# Patient Record
Sex: FEMALE | Race: BLACK OR AFRICAN AMERICAN | NOT HISPANIC OR LATINO | Employment: UNEMPLOYED | URBAN - METROPOLITAN AREA
[De-identification: names, ages, dates, MRNs, and addresses within clinical notes are randomized per-mention and may not be internally consistent; named-entity substitution may affect disease eponyms.]

---

## 2019-06-29 ENCOUNTER — HOSPITAL ENCOUNTER (EMERGENCY)
Facility: HOSPITAL | Age: 44
Discharge: HOME | End: 2019-06-29
Attending: SURGERY
Payer: MEDICAID

## 2019-06-29 VITALS
TEMPERATURE: 98 F | WEIGHT: 225 LBS | OXYGEN SATURATION: 97 % | BODY MASS INDEX: 39.87 KG/M2 | DIASTOLIC BLOOD PRESSURE: 59 MMHG | HEIGHT: 63 IN | RESPIRATION RATE: 16 BRPM | HEART RATE: 64 BPM | SYSTOLIC BLOOD PRESSURE: 111 MMHG

## 2019-06-29 DIAGNOSIS — I16.9 HYPERTENSIVE CRISIS: Primary | ICD-10-CM

## 2019-06-29 DIAGNOSIS — G44.209 ACUTE NON INTRACTABLE TENSION-TYPE HEADACHE: ICD-10-CM

## 2019-06-29 DIAGNOSIS — I10 HYPERTENSION: ICD-10-CM

## 2019-06-29 PROCEDURE — 99284 EMERGENCY DEPT VISIT MOD MDM: CPT | Mod: ER

## 2019-06-29 PROCEDURE — 93010 EKG 12-LEAD: ICD-10-PCS | Mod: ,,, | Performed by: INTERNAL MEDICINE

## 2019-06-29 PROCEDURE — 93005 ELECTROCARDIOGRAM TRACING: CPT | Mod: ER

## 2019-06-29 PROCEDURE — 93010 ELECTROCARDIOGRAM REPORT: CPT | Mod: ,,, | Performed by: INTERNAL MEDICINE

## 2019-06-29 PROCEDURE — 25000003 PHARM REV CODE 250: Mod: ER | Performed by: SURGERY

## 2019-06-29 RX ORDER — BUTALBITAL, ACETAMINOPHEN AND CAFFEINE 50; 325; 40 MG/1; MG/1; MG/1
1 TABLET ORAL EVERY 4 HOURS PRN
Qty: 15 TABLET | Refills: 0 | Status: SHIPPED | OUTPATIENT
Start: 2019-06-29 | End: 2019-07-29

## 2019-06-29 RX ORDER — CLONIDINE HYDROCHLORIDE 0.1 MG/1
0.1 TABLET ORAL
COMMUNITY
End: 2019-06-29 | Stop reason: SDUPTHER

## 2019-06-29 RX ORDER — CLONIDINE HYDROCHLORIDE 0.1 MG/1
0.1 TABLET ORAL
Qty: 10 TABLET | Refills: 2 | Status: ON HOLD | OUTPATIENT
Start: 2019-06-29 | End: 2019-07-08 | Stop reason: HOSPADM

## 2019-06-29 RX ORDER — HYDROCODONE BITARTRATE AND ACETAMINOPHEN 5; 325 MG/1; MG/1
1 TABLET ORAL
Status: COMPLETED | OUTPATIENT
Start: 2019-06-29 | End: 2019-06-29

## 2019-06-29 RX ORDER — AMLODIPINE BESYLATE 5 MG/1
10 TABLET ORAL DAILY
Qty: 30 TABLET | Refills: 2 | Status: ON HOLD | OUTPATIENT
Start: 2019-06-29 | End: 2019-07-08 | Stop reason: SDUPTHER

## 2019-06-29 RX ORDER — ONDANSETRON 4 MG/1
8 TABLET, ORALLY DISINTEGRATING ORAL
Status: COMPLETED | OUTPATIENT
Start: 2019-06-29 | End: 2019-06-29

## 2019-06-29 RX ADMIN — ONDANSETRON 8 MG: 4 TABLET, ORALLY DISINTEGRATING ORAL at 05:06

## 2019-06-29 RX ADMIN — CLONIDINE HYDROCHLORIDE 0.3 MG: 0.2 TABLET ORAL at 05:06

## 2019-06-29 RX ADMIN — HYDROCODONE BITARTRATE AND ACETAMINOPHEN 1 TABLET: 5; 325 TABLET ORAL at 05:06

## 2019-06-29 NOTE — ED PROVIDER NOTES
Encounter Date: 6/29/2019       History     Chief Complaint   Patient presents with    Headache     Pt c/o headache since last pm, states has had intermittently x 3 days.  States right eye area hurts. Denies blurred visioln.       Patient has had intermittent headache for 3 days dating to Thursday morning.  The pain is right retro-orbital and frontal and waxes and wanes.  The pain started again this morning and she came in for evaluation.  Her blood pressure was very high 247/131.  She takes tenormin 50 mg daily    The history is provided by the patient.   Hypertension    This is a chronic problem. The current episode started several days ago. The problem has been waxing and waning. Associated symptoms include headaches. Pertinent negatives include no chest pain. There are no associated agents to hypertension. There are no known risk factors.     Review of patient's allergies indicates:   Allergen Reactions    Shellfish containing products Hives     Past Medical History:   Diagnosis Date    Hypertension     Seizures      Past Surgical History:   Procedure Laterality Date    DILATION AND CURETTAGE OF UTERUS       History reviewed. No pertinent family history.  Social History     Tobacco Use    Smoking status: Never Smoker   Substance Use Topics    Alcohol use: No    Drug use: Not on file     Review of Systems   Constitutional: Negative.    HENT: Negative.    Eyes: Negative.    Respiratory: Negative.    Cardiovascular: Negative.  Negative for chest pain.   Gastrointestinal: Negative.    Endocrine: Negative.    Genitourinary: Negative.    Musculoskeletal: Negative.    Skin: Negative.    Allergic/Immunologic: Negative.    Neurological: Positive for headaches.   Hematological: Negative.    Psychiatric/Behavioral: Negative.        Physical Exam     Initial Vitals [06/29/19 1652]   BP Pulse Resp Temp SpO2   (!) 247/131 67 18 98.3 °F (36.8 °C) 100 %      MAP       --         Physical Exam    Nursing note and vitals  reviewed.  Constitutional: She appears well-developed and well-nourished.   HENT:   Head: Normocephalic.   Nose: Right sinus exhibits frontal sinus tenderness.   Eyes: Conjunctivae are normal.   Neck: Normal range of motion.   Cardiovascular: Normal rate.   Pulmonary/Chest: Breath sounds normal.   Abdominal: Soft.   Musculoskeletal: Normal range of motion.   Neurological: She is alert and oriented to person, place, and time. She has normal strength. GCS score is 15. GCS eye subscore is 4. GCS verbal subscore is 5. GCS motor subscore is 6.   NIH stroke scale 0   Skin: Capillary refill takes less than 2 seconds.   Psychiatric: She has a normal mood and affect.         ED Course   Procedures  Labs Reviewed - No data to display  EKG Readings: (Independently Interpreted)   Initial Reading: No STEMI. Rhythm: Normal Sinus Rhythm.   Nonspecific ST and T-wave abnormality       Imaging Results    None       X-Rays:   Independently Interpreted Readings:   Head CT: No hemorrhage.  No skull fracture.  No acute stroke.     Medical Decision Making:   Initial Assessment:   Hypertensive urgency  Clinical Tests:   Lab Tests: Ordered and Reviewed  Radiological Study: Ordered and Reviewed  Medical Tests: Ordered and Reviewed                      Clinical Impression:       ICD-10-CM ICD-9-CM   1. Hypertensive crisis I16.9 401.9   2. Hypertension I10 401.9   3. Acute non intractable tension-type headache G44.209 339.10         Disposition:   Disposition: Discharged  Condition: Stable                        Socorro Conway MD  06/29/19 1935

## 2019-06-30 NOTE — ED NOTES
Patient aox4, lying on stretcher, in no acute distress, vss. Patient denies any pain at this time. Dr. Conway in process of discharge. Family at bedside. Will continue to monitor.

## 2019-07-06 ENCOUNTER — HOSPITAL ENCOUNTER (OUTPATIENT)
Facility: HOSPITAL | Age: 44
Discharge: HOME | End: 2019-07-08
Attending: SURGERY | Admitting: FAMILY MEDICINE
Payer: MEDICAID

## 2019-07-06 DIAGNOSIS — I63.9 STROKE: ICD-10-CM

## 2019-07-06 DIAGNOSIS — G45.9 TIA (TRANSIENT ISCHEMIC ATTACK): ICD-10-CM

## 2019-07-06 DIAGNOSIS — I16.0 HYPERTENSIVE URGENCY: ICD-10-CM

## 2019-07-06 DIAGNOSIS — I16.9 HYPERTENSIVE CRISIS: Primary | ICD-10-CM

## 2019-07-06 DIAGNOSIS — I10 ESSENTIAL HYPERTENSION: Chronic | ICD-10-CM

## 2019-07-06 DIAGNOSIS — I63.411 CEREBRAL INFARCTION DUE TO EMBOLISM OF RIGHT MIDDLE CEREBRAL ARTERY: ICD-10-CM

## 2019-07-06 PROBLEM — D50.9 MICROCYTIC ANEMIA: Status: ACTIVE | Noted: 2019-07-06

## 2019-07-06 LAB
ACANTHOCYTES BLD QL SMEAR: PRESENT
ALBUMIN SERPL BCP-MCNC: 3.9 G/DL (ref 3.5–5.2)
ALP SERPL-CCNC: 62 U/L (ref 38–126)
ALT SERPL W/O P-5'-P-CCNC: 13 U/L (ref 10–44)
ANION GAP SERPL CALC-SCNC: 8 MMOL/L (ref 8–16)
ANISOCYTOSIS BLD QL SMEAR: SLIGHT
AST SERPL-CCNC: 19 U/L (ref 15–46)
BASOPHILS # BLD AUTO: 0.04 K/UL (ref 0–0.2)
BASOPHILS NFR BLD: 0.6 % (ref 0–1.9)
BILIRUB SERPL-MCNC: 0.3 MG/DL (ref 0.1–1)
BILIRUB UR QL STRIP: NEGATIVE
BUN SERPL-MCNC: 10 MG/DL (ref 7–17)
CALCIUM SERPL-MCNC: 8.8 MG/DL (ref 8.7–10.5)
CHLORIDE SERPL-SCNC: 106 MMOL/L (ref 95–110)
CHOLEST SERPL-MCNC: 150 MG/DL (ref 120–199)
CHOLEST/HDLC SERPL: 3 {RATIO} (ref 2–5)
CLARITY UR: CLEAR
CO2 SERPL-SCNC: 25 MMOL/L (ref 23–29)
COLOR UR: YELLOW
CREAT SERPL-MCNC: 0.58 MG/DL (ref 0.5–1.4)
DIFFERENTIAL METHOD: ABNORMAL
EOSINOPHIL # BLD AUTO: 0.2 K/UL (ref 0–0.5)
EOSINOPHIL NFR BLD: 3.5 % (ref 0–8)
ERYTHROCYTE [DISTWIDTH] IN BLOOD BY AUTOMATED COUNT: 20.3 % (ref 11.5–14.5)
EST. GFR  (AFRICAN AMERICAN): >60 ML/MIN/1.73 M^2
EST. GFR  (NON AFRICAN AMERICAN): >60 ML/MIN/1.73 M^2
ESTIMATED AVG GLUCOSE: 105 MG/DL (ref 68–131)
FERRITIN SERPL-MCNC: 3 NG/ML (ref 20–300)
GLUCOSE SERPL-MCNC: 110 MG/DL (ref 70–110)
GLUCOSE UR QL STRIP: NEGATIVE
HBA1C MFR BLD HPLC: 5.3 % (ref 4–5.6)
HCT VFR BLD AUTO: 29.9 % (ref 37–48.5)
HDLC SERPL-MCNC: 50 MG/DL (ref 40–75)
HDLC SERPL: 33.3 % (ref 20–50)
HGB BLD-MCNC: 8.4 G/DL (ref 12–16)
HGB UR QL STRIP: NEGATIVE
HYPOCHROMIA BLD QL SMEAR: ABNORMAL
INR PPP: 1.1 (ref 0.8–1.2)
IRON SERPL-MCNC: 15 UG/DL (ref 30–160)
KETONES UR QL STRIP: NEGATIVE
LDLC SERPL CALC-MCNC: 88.2 MG/DL (ref 63–159)
LEUKOCYTE ESTERASE UR QL STRIP: NEGATIVE
LYMPHOCYTES # BLD AUTO: 2.5 K/UL (ref 1–4.8)
LYMPHOCYTES NFR BLD: 36.5 % (ref 18–48)
MAGNESIUM SERPL-MCNC: 1.8 MG/DL (ref 1.6–2.6)
MCH RBC QN AUTO: 18.5 PG (ref 27–31)
MCHC RBC AUTO-ENTMCNC: 28.1 G/DL (ref 32–36)
MCV RBC AUTO: 66 FL (ref 82–98)
MONOCYTES # BLD AUTO: 0.6 K/UL (ref 0.3–1)
MONOCYTES NFR BLD: 8.8 % (ref 4–15)
NEUTROPHILS # BLD AUTO: 3.5 K/UL (ref 1.8–7.7)
NEUTROPHILS NFR BLD: 50.6 % (ref 38–73)
NITRITE UR QL STRIP: NEGATIVE
NONHDLC SERPL-MCNC: 100 MG/DL
PH UR STRIP: 6 [PH] (ref 5–8)
PHOSPHATE SERPL-MCNC: 3.3 MG/DL (ref 2.7–4.5)
PLATELET # BLD AUTO: 302 K/UL (ref 150–350)
PMV BLD AUTO: ABNORMAL FL (ref 9.2–12.9)
POCT GLUCOSE: 98 MG/DL (ref 70–110)
POIKILOCYTOSIS BLD QL SMEAR: SLIGHT
POTASSIUM SERPL-SCNC: 4.4 MMOL/L (ref 3.5–5.1)
PROT SERPL-MCNC: 7.3 G/DL (ref 6–8.4)
PROT UR QL STRIP: NEGATIVE
PROTHROMBIN TIME: 12.4 SEC (ref 9–12.5)
RBC # BLD AUTO: 4.55 M/UL (ref 4–5.4)
SATURATED IRON: 3 % (ref 20–50)
SODIUM SERPL-SCNC: 139 MMOL/L (ref 136–145)
SP GR UR STRIP: 1.02 (ref 1–1.03)
TOTAL IRON BINDING CAPACITY: 474 UG/DL (ref 250–450)
TRANSFERRIN SERPL-MCNC: 320 MG/DL (ref 200–375)
TRIGL SERPL-MCNC: 59 MG/DL (ref 30–150)
TROPONIN I SERPL DL<=0.01 NG/ML-MCNC: <0.006 NG/ML (ref 0–0.03)
TSH SERPL DL<=0.005 MIU/L-ACNC: 3.05 UIU/ML (ref 0.4–4)
URN SPEC COLLECT METH UR: NORMAL
UROBILINOGEN UR STRIP-ACNC: NEGATIVE EU/DL
WBC # BLD AUTO: 6.93 K/UL (ref 3.9–12.7)

## 2019-07-06 PROCEDURE — 25000003 PHARM REV CODE 250: Mod: ER | Performed by: SURGERY

## 2019-07-06 PROCEDURE — 82728 ASSAY OF FERRITIN: CPT

## 2019-07-06 PROCEDURE — 63600175 PHARM REV CODE 636 W HCPCS: Performed by: FAMILY MEDICINE

## 2019-07-06 PROCEDURE — 96361 HYDRATE IV INFUSION ADD-ON: CPT | Mod: ER

## 2019-07-06 PROCEDURE — 63600175 PHARM REV CODE 636 W HCPCS: Mod: ER | Performed by: SURGERY

## 2019-07-06 PROCEDURE — 81003 URINALYSIS AUTO W/O SCOPE: CPT

## 2019-07-06 PROCEDURE — 11000001 HC ACUTE MED/SURG PRIVATE ROOM

## 2019-07-06 PROCEDURE — 83540 ASSAY OF IRON: CPT

## 2019-07-06 PROCEDURE — 99291 CRITICAL CARE FIRST HOUR: CPT | Mod: 25,ER

## 2019-07-06 PROCEDURE — 80061 LIPID PANEL: CPT

## 2019-07-06 PROCEDURE — 84443 ASSAY THYROID STIM HORMONE: CPT | Mod: ER

## 2019-07-06 PROCEDURE — 83036 HEMOGLOBIN GLYCOSYLATED A1C: CPT

## 2019-07-06 PROCEDURE — 25000003 PHARM REV CODE 250: Performed by: PHYSICIAN ASSISTANT

## 2019-07-06 PROCEDURE — 82962 GLUCOSE BLOOD TEST: CPT | Mod: ER

## 2019-07-06 PROCEDURE — 85025 COMPLETE CBC W/AUTO DIFF WBC: CPT | Mod: ER

## 2019-07-06 PROCEDURE — 36415 COLL VENOUS BLD VENIPUNCTURE: CPT

## 2019-07-06 PROCEDURE — 80053 COMPREHEN METABOLIC PANEL: CPT | Mod: ER

## 2019-07-06 PROCEDURE — 93010 ELECTROCARDIOGRAM REPORT: CPT | Mod: ,,, | Performed by: INTERNAL MEDICINE

## 2019-07-06 PROCEDURE — 96374 THER/PROPH/DIAG INJ IV PUSH: CPT | Mod: ER

## 2019-07-06 PROCEDURE — 99205 OFFICE O/P NEW HI 60 MIN: CPT | Mod: GT,,, | Performed by: PSYCHIATRY & NEUROLOGY

## 2019-07-06 PROCEDURE — 99205 PR OFFICE/OUTPT VISIT, NEW, LEVL V, 60-74 MIN: ICD-10-PCS | Mod: GT,,, | Performed by: PSYCHIATRY & NEUROLOGY

## 2019-07-06 PROCEDURE — 84484 ASSAY OF TROPONIN QUANT: CPT

## 2019-07-06 PROCEDURE — G0378 HOSPITAL OBSERVATION PER HR: HCPCS

## 2019-07-06 PROCEDURE — 93010 EKG 12-LEAD: ICD-10-PCS | Mod: ,,, | Performed by: INTERNAL MEDICINE

## 2019-07-06 PROCEDURE — 83735 ASSAY OF MAGNESIUM: CPT

## 2019-07-06 PROCEDURE — 84100 ASSAY OF PHOSPHORUS: CPT

## 2019-07-06 PROCEDURE — 93005 ELECTROCARDIOGRAM TRACING: CPT | Mod: ER

## 2019-07-06 PROCEDURE — 85610 PROTHROMBIN TIME: CPT | Mod: ER

## 2019-07-06 PROCEDURE — 94760 N-INVAS EAR/PLS OXIMETRY 1: CPT | Mod: ER

## 2019-07-06 RX ORDER — CALCIUM CARBONATE 200(500)MG
500 TABLET,CHEWABLE ORAL DAILY PRN
Status: DISCONTINUED | OUTPATIENT
Start: 2019-07-06 | End: 2019-07-08 | Stop reason: HOSPADM

## 2019-07-06 RX ORDER — ATENOLOL 50 MG/1
100 TABLET ORAL 2 TIMES DAILY
Status: ON HOLD | COMMUNITY
End: 2019-07-08 | Stop reason: HOSPADM

## 2019-07-06 RX ORDER — SODIUM CHLORIDE 0.9 % (FLUSH) 0.9 %
10 SYRINGE (ML) INJECTION
Status: DISCONTINUED | OUTPATIENT
Start: 2019-07-06 | End: 2019-07-08 | Stop reason: HOSPADM

## 2019-07-06 RX ORDER — ENOXAPARIN SODIUM 100 MG/ML
40 INJECTION SUBCUTANEOUS EVERY 24 HOURS
Status: DISCONTINUED | OUTPATIENT
Start: 2019-07-06 | End: 2019-07-08 | Stop reason: HOSPADM

## 2019-07-06 RX ORDER — ASPIRIN 81 MG/1
81 TABLET ORAL DAILY
Status: DISCONTINUED | OUTPATIENT
Start: 2019-07-07 | End: 2019-07-08 | Stop reason: HOSPADM

## 2019-07-06 RX ORDER — ACETAMINOPHEN 325 MG/1
650 TABLET ORAL EVERY 6 HOURS PRN
Status: DISCONTINUED | OUTPATIENT
Start: 2019-07-06 | End: 2019-07-08 | Stop reason: HOSPADM

## 2019-07-06 RX ORDER — ONDANSETRON 8 MG/1
8 TABLET, ORALLY DISINTEGRATING ORAL EVERY 8 HOURS PRN
Status: DISCONTINUED | OUTPATIENT
Start: 2019-07-06 | End: 2019-07-08 | Stop reason: HOSPADM

## 2019-07-06 RX ORDER — ATORVASTATIN CALCIUM 40 MG/1
80 TABLET, FILM COATED ORAL DAILY
Status: DISCONTINUED | OUTPATIENT
Start: 2019-07-07 | End: 2019-07-06

## 2019-07-06 RX ORDER — LABETALOL HYDROCHLORIDE 5 MG/ML
10 INJECTION, SOLUTION INTRAVENOUS
Status: DISCONTINUED | OUTPATIENT
Start: 2019-07-06 | End: 2019-07-07

## 2019-07-06 RX ORDER — ATORVASTATIN CALCIUM 40 MG/1
80 TABLET, FILM COATED ORAL NIGHTLY
Status: DISCONTINUED | OUTPATIENT
Start: 2019-07-06 | End: 2019-07-08 | Stop reason: HOSPADM

## 2019-07-06 RX ORDER — ASPIRIN 325 MG
325 TABLET ORAL
Status: COMPLETED | OUTPATIENT
Start: 2019-07-06 | End: 2019-07-06

## 2019-07-06 RX ORDER — HYDRALAZINE HYDROCHLORIDE 20 MG/ML
10 INJECTION INTRAMUSCULAR; INTRAVENOUS EVERY 6 HOURS PRN
Status: DISCONTINUED | OUTPATIENT
Start: 2019-07-06 | End: 2019-07-07

## 2019-07-06 RX ADMIN — SODIUM CHLORIDE 250 ML: 0.9 INJECTION, SOLUTION INTRAVENOUS at 01:07

## 2019-07-06 RX ADMIN — LORAZEPAM 1 MG: 2 INJECTION INTRAMUSCULAR; INTRAVENOUS at 10:07

## 2019-07-06 RX ADMIN — ASPIRIN 325 MG ORAL TABLET 325 MG: 325 PILL ORAL at 09:07

## 2019-07-06 RX ADMIN — ENOXAPARIN SODIUM 40 MG: 100 INJECTION SUBCUTANEOUS at 07:07

## 2019-07-06 RX ADMIN — CLONIDINE HYDROCHLORIDE 0.3 MG: 0.2 TABLET ORAL at 10:07

## 2019-07-06 RX ADMIN — ATORVASTATIN CALCIUM 80 MG: 40 TABLET, FILM COATED ORAL at 08:07

## 2019-07-06 NOTE — HPI
Ms. Maru Camacho is a 42 y/o female with PMH of hypertension and seizures (last one 21 years ago). She presents with complaint of sudden onset left-sided numbness, heaviness, and pain to her arm and face around 0830 this morning. She states that she was in her usual state of health when she woke up and she took her BP meds as scheduled. Symptoms started after taking a shower and also include headache behind her right eye and lightheadedness. She denies fever, blurry vision, slurred speech, facial drooping, chest pain, shortness of breath, nausea, vomiting, abdominal pain, leg swelling or pain, difficulty ambulating, or seizure activity. Patient states that she has had high blood pressure since she was 24. She was seen in the ED last weekend for hypertensive urgency where she was prescribed clonidine as needed. She denies tobacco use and uses alcohol occasionally. She denies history of stroke or MI in her family. She lives in [place], [place] with her . Her PCP is Dr. Levine.    ED workup revealed hgb 8.4, labs otherwise unremarkable. Chest x-ray showed cardiomegaly without acute intrathoracic process. CT head showed no intracranial findings. /137, vitals otherwise stable. Telestroke consult by Dr. Guerin recommended admission for stroke workup. Not a tPA candidate due to minimal non-disabling symptoms. She was given aspirin 325 mg, clonidine 0.3 mg, ativan 1 mg, and  cc. She was admitted to [facility].

## 2019-07-06 NOTE — SUBJECTIVE & OBJECTIVE
"  Woke up with symptoms?: no    Recent bleeding noted: no  Does the patient take any Blood Thinners? yes  Medications: Antiplatelets:  aspirin      Past Medical History: hypertension    Past Surgical History: no relevant surgical history and no major surgeries within the last 2 weeks    Family History: no relevant history    Social History: no smoking, no drinking, no drugs    Allergies: Shellfish Containing Products      Review of Systems   Neurological: Positive for numbness and headaches.   All other systems reviewed and are negative.    Objective:   Vitals: Blood pressure (!) 235/137, pulse 63, resp. rate 18, height 5' 4" (1.626 m), weight 98.9 kg (218 lb), last menstrual period 06/28/2019, SpO2 98 %.      CT READ: Yes  No hemmorhage. No mass effect. No early infarct signs.     Physical Exam   Constitutional: She appears well-developed.   HENT:   Head: Normocephalic.   Eyes: Pupils are equal, round, and reactive to light.   Cardiovascular: Normal rate.   Pulmonary/Chest: Effort normal.   Abdominal: Soft.   Neurological: She is alert. A sensory deficit is present.   Skin: Skin is warm.         "

## 2019-07-06 NOTE — PLAN OF CARE
VN cued into pt's room for introduction. VN informed pt and family that VN would be working along side bedside nurse and PCT throughout shift. Level of present pain assessed. At present no distress noted. Thoroughly discussed with patient and family plan of care. Admission assessment data collected with assistance from patient and family. Discussed with patient High fall risk protocol and interventions that have been initiated and cont be in place for safety. Patient verbalized clear understanding and cooperation using teach back method. Bed alarm presently activated and in use. Will cont to be available to patient and intervene prn.

## 2019-07-06 NOTE — ED NOTES
MD explained to pt and pt's family that the pt has been accepted per Dr Romero and that the  wait is on the Chelle to plan the pt's room usually within the hour

## 2019-07-06 NOTE — CONSULTS
[facility]  Vascular Neurology  Comprehensive Stroke Center  Tele-Consultation Note      Inpatient consult to Telemedicine-Stroke  Consult performed by: Santana Guerin MD  Consult ordered by: LUIS Frazier III, MD          Consulting Provider: LUIS FRAZIER III  Current Providers  No providers found    Patient Location:  [facility] EMERGENCY DEPARTMENT Emergency Department  Spoke hospital nurse at bedside with patient assisting consultant.     Patient information was obtained from patient.         Assessment/Plan:     STROKE DOCUMENTATION     Acute Stroke Times:   Acute Stroke Times   Last Known Normal Date: 07/06/19  Last Known Normal Time: 0855  Stroke Team Arrival Date: 07/06/19  Stroke Team Arrival Time: 0942  CT Interpretation Time: 0951    NIH Scale:  1a. Level of Consciousness: 0-->Alert, keenly responsive  1b. LOC Questions: 0-->Answers both questions correctly  1c. LOC Commands: 0-->Performs both tasks correctly  2. Best Gaze: 0-->Normal  3. Visual: 0-->No visual loss  4. Facial Palsy: 0-->Normal symmetrical movements  5a. Motor Arm, Left: 0-->No drift, limb holds 90 (or 45) degrees for full 10 secs  5b. Motor Arm, Right: 0-->No drift, limb holds 90 (or 45) degrees for full 10 secs  6a. Motor Leg, Left: 0-->No drift, leg holds 30 degree position for full 5 secs  6b. Motor Leg, Right: 0-->No drift, leg holds 30 degree position for full 5 secs  7. Limb Ataxia: 0-->Absent  8. Sensory: 1-->Mild-to-moderate sensory loss, patient feels pinprick is less sharp or is dull on the affected side, or there is a loss of superficial pain with pinprick, but patient is aware of being touched  9. Best Language: 0-->No aphasia, normal  10. Dysarthria: 0-->Normal  11. Extinction and Inattention (formerly Neglect): 0-->No abnormality  Total (NIH Stroke Scale): 1     Modified [place]    Catia Coma Scale:    ABCD2 Score:    [id number] Score:   HAS -BLED Score:   ICH Score:   Hunt &  "Daniels Classification:       Diagnoses:   Cerebral infarction due to embolism of right middle cerebral artery  SEE HPI    Patient with left sided symptoms in setting of HTN urgency  CT head normal  Not TPA or Intra-arterial Therapy/ Catheter Based Intervention candidate due to minimal non-disabling symptoms   Will need BP control and stroke evaluation    NOTE: patient routinely has been seen in ED with SBP >220. Please address either non-compliance or potential secondary cause of HTN    Antithrombotics for secondary stroke prevention: Antiplatelets: Aspirin: 81 mg daily    Statins for secondary stroke prevention and hyperlipidemia, if present:   Statins: Atorvastatin- 80 mg daily    Aggressive risk factor modification: HTN, Diet, Exercise, Obesity     Rehab efforts: The patient has been evaluated by a stroke team provider and the therapy needs have been fully considered based off the presenting complaints and exam findings. The following therapy evaluations are needed: PT evaluate and treat, OT evaluate and treat, SLP evaluate and treat, PM&R evaluate for appropriate placement    Diagnostics ordered/pending: CT scan of head without contrast to asses brain parenchyma, HgbA1C to assess blood glucose levels, Lipid Profile to assess cholesterol levels, MRA head to assess vasculature, MRA neck/arch to assess vasculature, MRI head without contrast to assess brain parenchyma, TTE to assess cardiac function/status , Other: workup of HTN    VTE prophylaxis: Enoxaparin 40 mg SQ every 24 hours    BP parameters: Infarct: No intervention, SBP <220            Blood pressure (!) 235/137, pulse 63, resp. rate 18, height 5' 4" (1.626 m), weight 98.9 kg (218 lb), last menstrual period 06/28/2019, SpO2 98 %.  Alteplase Eligible?: No  Alteplase Recommendation: Alteplase not recommended due to minimal deficit  and Elevated BP   Possible Interventional Revascularization Candidate? No; No significant neurological deficit    Disposition " "Recommendation: admit to inpatient  transfer to system sub-[facility] by  ground  stat    Subjective:     History of Present Illness:  43 year old with poorly controlled HTN.  Presents with HA and left paresthesias. CT head normal. Exam normal. BP quite elevated.      Woke up with symptoms?: no    Recent bleeding noted: no  Does the patient take any Blood Thinners? yes  Medications: Antiplatelets:  aspirin      Past Medical History: hypertension    Past Surgical History: no relevant surgical history and no major surgeries within the last 2 weeks    Family History: no relevant history    Social History: no smoking, no drinking, no drugs    Allergies: Shellfish Containing Products      Review of Systems   Neurological: Positive for numbness and headaches.   All other systems reviewed and are negative.    Objective:   Vitals: Blood pressure (!) 235/137, pulse 63, resp. rate 18, height 5' 4" (1.626 m), weight 98.9 kg (218 lb), last menstrual period 06/28/2019, SpO2 98 %.      CT READ: Yes  No hemmorhage. No mass effect. No early infarct signs.     Physical Exam   Constitutional: She appears well-developed.   HENT:   Head: Normocephalic.   Eyes: Pupils are equal, round, and reactive to light.   Cardiovascular: Normal rate.   Pulmonary/Chest: Effort normal.   Abdominal: Soft.   Neurological: She is alert. A sensory deficit is present.   Skin: Skin is warm.             Recommended the emergency room physician to have a brief discussion with the patient and/or family if available regarding the risks and benefits of treatment, and to briefly document the occurrence of that discussion in his clinical encounter note.     The attending portion of this evaluation, treatment, and documentation was performed per Santana Guerin MD via audiovisual.    Billing code:  (moderate to severe stroke, large areas of edema, some mimics)    · This patient has a critical neurological condition/illness, with high morbidity and " mortality.  · There is a high probability for acute neurological change leading to clinical and possibly life-threatening deterioration requiring highest level of physician preparedness for urgent intervention.  · Care was coordinated with other physicians involved in the patient's care.  · Radiologic studies and laboratory data were reviewed and interpreted, and plan of care was re-assessed based on the results.  · Diagnosis, treatment options and prognosis may have been discussed with the patient and/or family members or caregiver.  · Further advanced medical management and further evaluation is warranted for his care.      In your opinion, this was a: Tier 1 Van Negative    Consult End Time: 10:05 AM     Santana Guerin MD  Comprehensive Stroke Center  Vascular Neurology   [facility]

## 2019-07-06 NOTE — ED PROVIDER NOTES
"Encounter Date: 7/6/2019       History     Chief Complaint   Patient presents with    Numbness     "I feel like numbness to the left side of my face and my left arm and it started when I got out of the shower at 8:55am"     43-year-old with complaint of headache and onset of numbness over her left face and left arm at 8:55 a.m. after she got out shower.  She does not endorse dysarthria or trouble or swallowing difficulty or motor deficit or visual change    The history is provided by the patient.   Neurologic Problem   The primary symptoms include paresthesias. Primary symptoms do not include loss of consciousness, altered mental status, dizziness, visual change, focal weakness or speech change. The symptoms began 1 to 2 hours ago. The episode lasted 2 hours. The symptoms are unchanged. The neurological symptoms are focal.   Paresthesias began 1 - 3 hours ago. The paresthesias are unchanged. The paresthesias are described as tingling. Affected locations include the: head and left upper arm.   Additional symptoms do not include neck stiffness or weakness.     Review of patient's allergies indicates:   Allergen Reactions    Shellfish containing products Hives     Past Medical History:   Diagnosis Date    Hypertension     Seizures      Past Surgical History:   Procedure Laterality Date    DILATION AND CURETTAGE OF UTERUS       No family history on file.  Social History     Tobacco Use    Smoking status: Never Smoker   Substance Use Topics    Alcohol use: No    Drug use: Not on file     Review of Systems   Constitutional: Negative.    HENT: Negative.    Eyes: Negative.    Respiratory: Negative.    Cardiovascular: Negative.    Gastrointestinal: Negative.    Endocrine: Negative.    Genitourinary: Negative.    Musculoskeletal: Negative.  Negative for neck stiffness.   Skin: Negative.    Allergic/Immunologic: Negative.    Neurological: Positive for numbness and paresthesias. Negative for dizziness, speech change, " focal weakness, loss of consciousness and weakness.   Hematological: Negative.    Psychiatric/Behavioral: Negative.        Physical Exam     Initial Vitals [07/06/19 0932]   BP Pulse Resp Temp SpO2   (!) 235/137 63 18 -- 98 %      MAP       --         Physical Exam    Nursing note and vitals reviewed.  Constitutional: She appears well-developed.   HENT:   Head: Normocephalic.   Eyes: Conjunctivae are normal.   Neck: Normal range of motion.   Cardiovascular: Normal rate.   Pulmonary/Chest: Breath sounds normal.   Abdominal: Soft.   Musculoskeletal: Normal range of motion.   Neurological: She is alert and oriented to person, place, and time. No cranial nerve deficit. GCS score is 15. GCS eye subscore is 4. GCS verbal subscore is 5. GCS motor subscore is 6.   NIH stroke scale 1 with mild-to-moderate sensory loss   Skin: Capillary refill takes less than 2 seconds.   Psychiatric: She has a normal mood and affect.         ED Course   Critical Care  Date/Time: 7/6/2019 2:29 PM  Performed by: LUIS Kaur III, MD  Authorized by: LUIS Kaur III, MD   Additional history critical care time: 30 minutes  Total critical care time (exclusive of procedural time) : 30 minutes        Labs Reviewed   CBC W/ AUTO DIFFERENTIAL - Abnormal; Notable for the following components:       Result Value    Hemoglobin 8.4 (*)     Hematocrit 29.9 (*)     Mean Corpuscular Volume 66 (*)     Mean Corpuscular Hemoglobin 18.5 (*)     Mean Corpuscular Hemoglobin Conc 28.1 (*)     RDW 20.3 (*)     All other components within normal limits   COMPREHENSIVE METABOLIC PANEL   PROTIME-INR   TSH   LIPID PANEL   POCT GLUCOSE, HAND-HELD DEVICE   POCT GLUCOSE     EKG Readings: (Independently Interpreted)   Rhythm: Normal Sinus Rhythm. Ectopy: No Ectopy. Conduction: Normal. ST Segments: Normal ST Segments. T Waves: Normal. Clinical Impression: Normal Sinus Rhythm       Imaging Results          X-Ray Chest AP Portable (Final result)  Result time  07/06/19 09:54:32    Final result by Griffin Mandel Jr., MD (07/06/19 09:54:32)                 Impression:      No acute cardiopulmonary disease.  Cardiomegaly.      Electronically signed by: Griffin Mandel Jr., MD  Date:    07/06/2019  Time:    09:54             Narrative:    EXAMINATION:  XR CHEST AP PORTABLE    CLINICAL HISTORY:  Stroke;    COMPARISON:  None    FINDINGS:  The lungs are clear. The cardiac silhouette is mildly enlarged.  Mediastinum unremarkable.  No effusion or pneumothorax.  The remaining osseous structures and soft tissues are within normal limits.                               CT Head Without Contrast (Final result)  Result time 07/06/19 09:54:05    Final result by Griffin Mandel Jr., MD (07/06/19 09:54:05)                 Impression:      1. No acute intracranial findings.  All CT scans at this facility are performed  using dose modulation techniques as appropriate to performed exam including the following:  automated exposure control; adjustment of mA and/or kV according to the patients size (this includes techniques or standardized protocols for targeted exams where dose is matched to indication/reason for exam: i.e. extremities or head);  iterative reconstruction technique.      Electronically signed by: Griffin Mandel Jr., MD  Date:    07/06/2019  Time:    09:54             Narrative:    EXAMINATION:  CT HEAD WITHOUT CONTRAST    CLINICAL HISTORY:  Dizziness;    TECHNIQUE:  CT scan was obtained of the head without administration of contrast.    COMPARISON:  06/29/2019    FINDINGS:  Ventricles and basal cisterns are normal.  No hemorrhage, mass effect or midline shift.  No cerebral or cerebellar parenchymal abnormality.  Paranasal sinuses are clear.  Mastoid air cells are clear.  Calvarium is intact.                                 Medical Decision Making:   Initial Assessment:   Hypertensive crisis with paresthesias of left face  ED Management:  Hypertensive urgency with blood pressure  240/120 and paresthesias left face and arm recommend admission for blood pressure control MRI brain to rule out stroke                      Clinical Impression:       ICD-10-CM ICD-9-CM   1. Hypertensive crisis I16.9 401.9   2. Stroke I63.9 434.91   3. TIA (transient ischemic attack) G45.9 435.9         Disposition:   Disposition: Admitted                        LUIS Kaur III, MD  07/06/19 [id number]

## 2019-07-06 NOTE — H&P
"[facility]  History & Physical    Patient Name: Maru Camacho  MRN: [id number]  Admission Date: 7/6/2019  Attending Physician: Sarah Maier*   Primary Care Provider: Primary Doctor No    Patient information was obtained from patient, past medical records and ER records.     Subjective:     Principal Problem:Cerebral infarction due to embolism of right middle cerebral artery    Chief Complaint:   Chief Complaint   Patient presents with    Numbness     "I feel like numbness to the left side of my face and my left arm and it started when I got out of the shower at 8:55am"        HPI: Ms. Maru Camacho is a 42 y/o female with PMH of hypertension and seizures (last one 21 years ago). She presents with complaint of sudden onset left-sided numbness, heaviness, and pain to her arm and face around 0830 this morning. She states that she was in her usual state of health when she woke up and she took her BP meds as scheduled. Symptoms started after taking a shower and also include headache behind her right eye and lightheadedness. She denies fever, blurry vision, slurred speech, facial drooping, chest pain, shortness of breath, nausea, vomiting, abdominal pain, leg swelling or pain, difficulty ambulating, or seizure activity. Patient states that she has had high blood pressure since she was 24. She was seen in the ED last weekend for hypertensive urgency where she was prescribed clonidine as needed. She denies tobacco use and uses alcohol occasionally. She denies history of stroke or MI in her family. She lives in [place], [place] with her . Her PCP is Dr. Levine.    ED workup revealed hgb 8.4, labs otherwise unremarkable. Chest x-ray showed cardiomegaly without acute intrathoracic process. CT head showed no intracranial findings. /137, vitals otherwise stable. Telestroke consult by Dr. Guerin recommended admission for stroke workup. Not a tPA candidate due to " minimal non-disabling symptoms. She was given aspirin 325 mg, clonidine 0.3 mg, ativan 1 mg, and  cc. She was admitted to [facility].    Past Medical History:   Diagnosis Date    Hypertension     Seizures        Past Surgical History:   Procedure Laterality Date    DILATION AND CURETTAGE OF UTERUS         Review of patient's allergies indicates:   Allergen Reactions    Shellfish containing products Hives       No current facility-administered medications on file prior to encounter.      Current Outpatient Medications on File Prior to Encounter   Medication Sig    amLODIPine (NORVASC) 5 MG tablet Take 2 tablets (10 mg total) by mouth once daily.    acetaminophen (TYLENOL) 500 MG tablet Take 2 tablets (1,000 mg total) by mouth 3 (three) times daily as needed for Pain.    atenolol (TENORMIN) 50 MG tablet Take 2 tablets (100 mg total) by mouth once daily.    butalbital-acetaminophen-caffeine -40 mg (FIORICET, ESGIC) -40 mg per tablet Take 1 tablet by mouth every 4 (four) hours as needed for Pain.    cloNIDine (CATAPRES) 0.1 MG tablet Take 1 tablet (0.1 mg total) by mouth as needed (For blood pressure greater than 160).    ibuprofen (ADVIL,MOTRIN) 200 MG tablet Take 2 tablets (400 mg total) by mouth every 6 (six) hours as needed for Pain.    triamterene-hydrochlorothiazide 37.5-25 mg (MAXZIDE-25) 37.5-25 mg per tablet Take 1 tablet by mouth once daily. For blood pressure    [DISCONTINUED] LISINOPRIL ORAL Take by mouth.     Family History     None        Tobacco Use    Smoking status: Never Smoker   Substance and Sexual Activity    Alcohol use: No    Drug use: Never    Sexual activity: Yes     Partners: Male     Review of Systems   Constitutional: Negative for chills, diaphoresis, fatigue and fever.   HENT: Negative for drooling and trouble swallowing.    Eyes: Negative for photophobia and visual disturbance.   Respiratory: Negative for cough and shortness of breath.     Cardiovascular: Negative for chest pain, palpitations and leg swelling.   Gastrointestinal: Negative for abdominal distention, abdominal pain, constipation, diarrhea, nausea and vomiting.   Endocrine: Negative for polydipsia, polyphagia and polyuria.   Genitourinary: Negative for difficulty urinating, dysuria and hematuria.   Musculoskeletal: Negative for arthralgias, back pain, gait problem, myalgias and neck pain.   Skin: Negative for color change, pallor, rash and wound.   Allergic/Immunologic: Negative for environmental allergies, food allergies and immunocompromised state.   Neurological: Positive for weakness (left arm), light-headedness, numbness (left arm and face) and headaches. Negative for dizziness, seizures, syncope, facial asymmetry and speech difficulty.   Hematological: Negative for adenopathy. Does not bruise/bleed easily.   Psychiatric/Behavioral: Negative for confusion. The patient is not nervous/anxious.      Objective:     Vital Signs (Most Recent):  Temp: 97 °F (36.1 °C) (07/06/19 1733)  Pulse: (!) 58 (07/06/19 1733)  Resp: 18 (07/06/19 1733)  BP: (!) 171/82 (07/06/19 1733)  SpO2: 100 % (07/06/19 1733) Vital Signs (24h Range):  Temp:  [97 °F (36.1 °C)] 97 °F (36.1 °C)  Pulse:  [57-63] 58  Resp:  [17-25] 18  SpO2:  [96 %-100 %] 100 %  BP: (138-235)/() 171/82     Weight: 100 kg (220 lb 7.4 oz)  Body mass index is 37.84 kg/m².    Physical Exam   Constitutional: She is oriented to person, place, and time. She appears well-developed. No distress.   Obese   HENT:   Head: Normocephalic and atraumatic.   Right Ear: External ear normal.   Left Ear: External ear normal.   Nose: Nose normal.   Mouth/Throat: Oropharynx is clear and moist. No oropharyngeal exudate.   Eyes: Pupils are equal, round, and reactive to light. Conjunctivae and EOM are normal.   Neck: Normal range of motion. Neck supple. No JVD present. No tracheal deviation present.   No carotid bruit noted   Cardiovascular: Normal rate,  regular rhythm and intact distal pulses.   Murmur heard.  Pulmonary/Chest: Effort normal and breath sounds normal. No respiratory distress. She has no wheezes. She has no rales.   Abdominal: Soft. Bowel sounds are normal. She exhibits no distension. There is no tenderness.   Musculoskeletal: Normal range of motion. She exhibits no edema or tenderness.   Neurological: She is alert and oriented to person, place, and time. She has normal strength. No cranial nerve deficit or sensory deficit. She exhibits normal muscle tone. She displays no seizure activity. Coordination and gait normal. GCS eye subscore is 4. GCS verbal subscore is 5. GCS motor subscore is 6.   Skin: Skin is warm and dry. Capillary refill takes less than 2 seconds. She is not diaphoretic. No erythema.   Psychiatric: She has a normal mood and affect. Her behavior is normal. Judgment and thought content normal.   Nursing note and vitals reviewed.        CRANIAL NERVES     CN III, IV, VI   Pupils are equal, round, and reactive to light.  Extraocular motions are normal.        Significant Labs: All pertinent labs within the past 24 hours have been reviewed.    Significant Imaging: I have reviewed all pertinent imaging results/findings within the past 24 hours.   Ct Head Without Contrast    Result Date: 7/6/2019  EXAMINATION: CT HEAD WITHOUT CONTRAST CLINICAL HISTORY: Dizziness; TECHNIQUE: CT scan was obtained of the head without administration of contrast. COMPARISON: 06/29/2019 FINDINGS: Ventricles and basal cisterns are normal.  No hemorrhage, mass effect or midline shift.  No cerebral or cerebellar parenchymal abnormality.  Paranasal sinuses are clear.  Mastoid air cells are clear.  Calvarium is intact.     1. No acute intracranial findings. All CT scans at this facility are performed  using dose modulation techniques as appropriate to performed exam including the following:  automated exposure control; adjustment of mA and/or kV according to the  patients size (this includes techniques or standardized protocols for targeted exams where dose is matched to indication/reason for exam: i.e. extremities or head);  iterative reconstruction technique.    Electronically signed by: Griffin Mandel Jr., MD Date:  07/06/2019 Time: 09:54    X-ray Chest Ap Portable    Result Date: 7/6/2019  EXAMINATION: XR CHEST AP PORTABLE CLINICAL HISTORY: Stroke; COMPARISON: None FINDINGS: The lungs are clear. The cardiac silhouette is mildly enlarged.  Mediastinum unremarkable.  No effusion or pneumothorax.  The remaining osseous structures and soft tissues are within normal limits.     No acute cardiopulmonary disease.  Cardiomegaly.    Electronically signed by: Griffin Mandel Jr., MD Date:  07/06/2019 Time: 09:54      Assessment/Plan:     * Cerebral infarction due to embolism of right middle cerebral artery  Hypertensive urgency  - Known risk factors: HTN  - Check TSH, lipid profile, HbA1c  - Daily ASA, statin  - MRI/MRA, 2D ECHO (to be performed Monday)  - Monitor on telemetry   - Permissive HTN for now. Hold home meds. Labetalol or hydralazine for SBP >220  - PT/OT/ST consults  - Neuro checks q4h  - Low sodium cardiac diet when KIMBERLY passed  - Avoid NSAIDs  - See Stroke Telemedicine note    Microcytic anemia  -Hgb 8.4, MCV 66  -Check iron studies        VTE Risk Mitigation (From admission, onward)        Ordered     enoxaparin injection 40 mg  Daily      07/06/19 1736     IP VTE HIGH RISK PATIENT  Once      07/06/19 1736     Place sequential compression device  Until discontinued      07/06/19 1736          Yesi Rivera PA-C  Department of Hospital Medicine   [facility]

## 2019-07-06 NOTE — HPI
43 year old with poorly controlled HTN.  Presents with HA and left paresthesias. CT head normal. Exam normal. BP quite elevated.

## 2019-07-06 NOTE — ASSESSMENT & PLAN NOTE
Hypertensive urgency  - Known risk factors: HTN  - Check TSH, lipid profile, HbA1c  - Daily ASA, statin  - MRI/MRA, 2D ECHO (to be performed Monday)  - Monitor on telemetry   - Permissive HTN for now. Hold home meds. Labetalol or hydralazine for SBP >220  - PT/OT/ST consults  - Neuro checks q4h  - Low sodium cardiac diet when KIMBERLY passed  - Avoid NSAIDs  - See Stroke Telemedicine note

## 2019-07-06 NOTE — ASSESSMENT & PLAN NOTE
SEE HPI    Patient with left sided symptoms in setting of HTN urgency  CT head normal  Not TPA or Intra-arterial Therapy/ Catheter Based Intervention candidate due to minimal non-disabling symptoms   Will need BP control and stroke evaluation    NOTE: patient routinely has been seen in ED with SBP >220. Please address either non-compliance or potential secondary cause of HTN    Antithrombotics for secondary stroke prevention: Antiplatelets: Aspirin: 81 mg daily    Statins for secondary stroke prevention and hyperlipidemia, if present:   Statins: Atorvastatin- 80 mg daily    Aggressive risk factor modification: HTN, Diet, Exercise, Obesity     Rehab efforts: The patient has been evaluated by a stroke team provider and the therapy needs have been fully considered based off the presenting complaints and exam findings. The following therapy evaluations are needed: PT evaluate and treat, OT evaluate and treat, SLP evaluate and treat, PM&R evaluate for appropriate placement    Diagnostics ordered/pending: CT scan of head without contrast to asses brain parenchyma, HgbA1C to assess blood glucose levels, Lipid Profile to assess cholesterol levels, MRA head to assess vasculature, MRA neck/arch to assess vasculature, MRI head without contrast to assess brain parenchyma, TTE to assess cardiac function/status , Other: workup of HTN    VTE prophylaxis: Enoxaparin 40 mg SQ every 24 hours    BP parameters: Infarct: No intervention, SBP <220

## 2019-07-06 NOTE — SUBJECTIVE & OBJECTIVE
Past Medical History:   Diagnosis Date    Hypertension     Seizures        Past Surgical History:   Procedure Laterality Date    DILATION AND CURETTAGE OF UTERUS         Review of patient's allergies indicates:   Allergen Reactions    Shellfish containing products Hives       No current facility-administered medications on file prior to encounter.      Current Outpatient Medications on File Prior to Encounter   Medication Sig    amLODIPine (NORVASC) 5 MG tablet Take 2 tablets (10 mg total) by mouth once daily.    acetaminophen (TYLENOL) 500 MG tablet Take 2 tablets (1,000 mg total) by mouth 3 (three) times daily as needed for Pain.    atenolol (TENORMIN) 50 MG tablet Take 2 tablets (100 mg total) by mouth once daily.    butalbital-acetaminophen-caffeine -40 mg (FIORICET, ESGIC) -40 mg per tablet Take 1 tablet by mouth every 4 (four) hours as needed for Pain.    cloNIDine (CATAPRES) 0.1 MG tablet Take 1 tablet (0.1 mg total) by mouth as needed (For blood pressure greater than 160).    ibuprofen (ADVIL,MOTRIN) 200 MG tablet Take 2 tablets (400 mg total) by mouth every 6 (six) hours as needed for Pain.    triamterene-hydrochlorothiazide 37.5-25 mg (MAXZIDE-25) 37.5-25 mg per tablet Take 1 tablet by mouth once daily. For blood pressure    [DISCONTINUED] LISINOPRIL ORAL Take by mouth.     Family History     None        Tobacco Use    Smoking status: Never Smoker   Substance and Sexual Activity    Alcohol use: No    Drug use: Never    Sexual activity: Yes     Partners: Male     Review of Systems   Constitutional: Negative for chills, diaphoresis, fatigue and fever.   HENT: Negative for drooling and trouble swallowing.    Eyes: Negative for photophobia and visual disturbance.   Respiratory: Negative for cough and shortness of breath.    Cardiovascular: Negative for chest pain, palpitations and leg swelling.   Gastrointestinal: Negative for abdominal distention, abdominal pain, constipation,  diarrhea, nausea and vomiting.   Endocrine: Negative for polydipsia, polyphagia and polyuria.   Genitourinary: Negative for difficulty urinating, dysuria and hematuria.   Musculoskeletal: Negative for arthralgias, back pain, gait problem, myalgias and neck pain.   Skin: Negative for color change, pallor, rash and wound.   Allergic/Immunologic: Negative for environmental allergies, food allergies and immunocompromised state.   Neurological: Positive for weakness (left arm), light-headedness, numbness (left arm and face) and headaches. Negative for dizziness, seizures, syncope, facial asymmetry and speech difficulty.   Hematological: Negative for adenopathy. Does not bruise/bleed easily.   Psychiatric/Behavioral: Negative for confusion. The patient is not nervous/anxious.      Objective:     Vital Signs (Most Recent):  Temp: 97 °F (36.1 °C) (07/06/19 1733)  Pulse: (!) 58 (07/06/19 1733)  Resp: 18 (07/06/19 1733)  BP: (!) 171/82 (07/06/19 1733)  SpO2: 100 % (07/06/19 1733) Vital Signs (24h Range):  Temp:  [97 °F (36.1 °C)] 97 °F (36.1 °C)  Pulse:  [57-63] 58  Resp:  [17-25] 18  SpO2:  [96 %-100 %] 100 %  BP: (138-235)/() 171/82     Weight: 100 kg (220 lb 7.4 oz)  Body mass index is 37.84 kg/m².    Physical Exam   Constitutional: She is oriented to person, place, and time. She appears well-developed. No distress.   Obese   HENT:   Head: Normocephalic and atraumatic.   Right Ear: External ear normal.   Left Ear: External ear normal.   Nose: Nose normal.   Mouth/Throat: Oropharynx is clear and moist. No oropharyngeal exudate.   Eyes: Pupils are equal, round, and reactive to light. Conjunctivae and EOM are normal.   Neck: Normal range of motion. Neck supple. No JVD present. No tracheal deviation present.   No carotid bruit noted   Cardiovascular: Normal rate, regular rhythm and intact distal pulses.   Murmur heard.  Pulmonary/Chest: Effort normal and breath sounds normal. No respiratory distress. She has no wheezes.  She has no rales.   Abdominal: Soft. Bowel sounds are normal. She exhibits no distension. There is no tenderness.   Musculoskeletal: Normal range of motion. She exhibits no edema or tenderness.   Neurological: She is alert and oriented to person, place, and time. She has normal strength. No cranial nerve deficit or sensory deficit. She exhibits normal muscle tone. She displays no seizure activity. Coordination and gait normal. GCS eye subscore is 4. GCS verbal subscore is 5. GCS motor subscore is 6.   Skin: Skin is warm and dry. Capillary refill takes less than 2 seconds. She is not diaphoretic. No erythema.   Psychiatric: She has a normal mood and affect. Her behavior is normal. Judgment and thought content normal.   Nursing note and vitals reviewed.        CRANIAL NERVES     CN III, IV, VI   Pupils are equal, round, and reactive to light.  Extraocular motions are normal.        Significant Labs: All pertinent labs within the past 24 hours have been reviewed.    Significant Imaging: I have reviewed all pertinent imaging results/findings within the past 24 hours.   Ct Head Without Contrast    Result Date: 7/6/2019  EXAMINATION: CT HEAD WITHOUT CONTRAST CLINICAL HISTORY: Dizziness; TECHNIQUE: CT scan was obtained of the head without administration of contrast. COMPARISON: 06/29/2019 FINDINGS: Ventricles and basal cisterns are normal.  No hemorrhage, mass effect or midline shift.  No cerebral or cerebellar parenchymal abnormality.  Paranasal sinuses are clear.  Mastoid air cells are clear.  Calvarium is intact.     1. No acute intracranial findings. All CT scans at this facility are performed  using dose modulation techniques as appropriate to performed exam including the following:  automated exposure control; adjustment of mA and/or kV according to the patients size (this includes techniques or standardized protocols for targeted exams where dose is matched to indication/reason for exam: i.e. extremities or head);   iterative reconstruction technique.    Electronically signed by: Griffin Mandel Jr., MD Date:  07/06/2019 Time: 09:54    X-ray Chest Ap Portable    Result Date: 7/6/2019  EXAMINATION: XR CHEST AP PORTABLE CLINICAL HISTORY: Stroke; COMPARISON: None FINDINGS: The lungs are clear. The cardiac silhouette is mildly enlarged.  Mediastinum unremarkable.  No effusion or pneumothorax.  The remaining osseous structures and soft tissues are within normal limits.     No acute cardiopulmonary disease.  Cardiomegaly.    Electronically signed by: Griffin Mandel Jr., MD Date:  07/06/2019 Time: 09:54

## 2019-07-07 PROBLEM — G45.9 TIA (TRANSIENT ISCHEMIC ATTACK): Status: ACTIVE | Noted: 2019-07-07

## 2019-07-07 PROBLEM — I10 ESSENTIAL HYPERTENSION: Chronic | Status: ACTIVE | Noted: 2019-07-06

## 2019-07-07 LAB
ANION GAP SERPL CALC-SCNC: 5 MMOL/L (ref 8–16)
ANISOCYTOSIS BLD QL SMEAR: SLIGHT
BASOPHILS # BLD AUTO: 0.02 K/UL (ref 0–0.2)
BASOPHILS NFR BLD: 0.4 % (ref 0–1.9)
BUN SERPL-MCNC: 10 MG/DL (ref 6–20)
CALCIUM SERPL-MCNC: 8.7 MG/DL (ref 8.7–10.5)
CHLORIDE SERPL-SCNC: 108 MMOL/L (ref 95–110)
CO2 SERPL-SCNC: 26 MMOL/L (ref 23–29)
CREAT SERPL-MCNC: 0.7 MG/DL (ref 0.5–1.4)
DIFFERENTIAL METHOD: ABNORMAL
EOSINOPHIL # BLD AUTO: 0.2 K/UL (ref 0–0.5)
EOSINOPHIL NFR BLD: 3 % (ref 0–8)
ERYTHROCYTE [DISTWIDTH] IN BLOOD BY AUTOMATED COUNT: 19.4 % (ref 11.5–14.5)
EST. GFR  (AFRICAN AMERICAN): >60 ML/MIN/1.73 M^2
EST. GFR  (NON AFRICAN AMERICAN): >60 ML/MIN/1.73 M^2
GLUCOSE SERPL-MCNC: 91 MG/DL (ref 70–110)
HCT VFR BLD AUTO: 30.5 % (ref 37–48.5)
HGB BLD-MCNC: 8.3 G/DL (ref 12–16)
HYPOCHROMIA BLD QL SMEAR: ABNORMAL
LYMPHOCYTES # BLD AUTO: 2.3 K/UL (ref 1–4.8)
LYMPHOCYTES NFR BLD: 41.6 % (ref 18–48)
MCH RBC QN AUTO: 18.1 PG (ref 27–31)
MCHC RBC AUTO-ENTMCNC: 27.2 G/DL (ref 32–36)
MCV RBC AUTO: 66 FL (ref 82–98)
MONOCYTES # BLD AUTO: 0.5 K/UL (ref 0.3–1)
MONOCYTES NFR BLD: 9.2 % (ref 4–15)
NEUTROPHILS # BLD AUTO: 2.5 K/UL (ref 1.8–7.7)
NEUTROPHILS NFR BLD: 45.8 % (ref 38–73)
PLATELET # BLD AUTO: 293 K/UL (ref 150–350)
PLATELET BLD QL SMEAR: ABNORMAL
PMV BLD AUTO: ABNORMAL FL (ref 9.2–12.9)
POIKILOCYTOSIS BLD QL SMEAR: SLIGHT
POTASSIUM SERPL-SCNC: 3.8 MMOL/L (ref 3.5–5.1)
RBC # BLD AUTO: 4.59 M/UL (ref 4–5.4)
SODIUM SERPL-SCNC: 139 MMOL/L (ref 136–145)
WBC # BLD AUTO: 5.41 K/UL (ref 3.9–12.7)

## 2019-07-07 PROCEDURE — 97530 THERAPEUTIC ACTIVITIES: CPT

## 2019-07-07 PROCEDURE — 63600175 PHARM REV CODE 636 W HCPCS: Performed by: PHYSICIAN ASSISTANT

## 2019-07-07 PROCEDURE — 97116 GAIT TRAINING THERAPY: CPT

## 2019-07-07 PROCEDURE — 92523 SPEECH SOUND LANG COMPREHEN: CPT

## 2019-07-07 PROCEDURE — 97161 PT EVAL LOW COMPLEX 20 MIN: CPT

## 2019-07-07 PROCEDURE — 25000003 PHARM REV CODE 250: Performed by: FAMILY MEDICINE

## 2019-07-07 PROCEDURE — 25000003 PHARM REV CODE 250: Performed by: PHYSICIAN ASSISTANT

## 2019-07-07 PROCEDURE — 94761 N-INVAS EAR/PLS OXIMETRY MLT: CPT

## 2019-07-07 PROCEDURE — 99900038 HC OT GENERIC THERAPY SCREENING (STAT)

## 2019-07-07 PROCEDURE — 96375 TX/PRO/DX INJ NEW DRUG ADDON: CPT

## 2019-07-07 PROCEDURE — 80048 BASIC METABOLIC PNL TOTAL CA: CPT

## 2019-07-07 PROCEDURE — 92610 EVALUATE SWALLOWING FUNCTION: CPT

## 2019-07-07 PROCEDURE — 85025 COMPLETE CBC W/AUTO DIFF WBC: CPT

## 2019-07-07 PROCEDURE — G0378 HOSPITAL OBSERVATION PER HR: HCPCS

## 2019-07-07 RX ORDER — LOSARTAN POTASSIUM 25 MG/1
25 TABLET ORAL DAILY
Status: DISCONTINUED | OUTPATIENT
Start: 2019-07-07 | End: 2019-07-08

## 2019-07-07 RX ORDER — AMLODIPINE BESYLATE 5 MG/1
10 TABLET ORAL DAILY
Status: DISCONTINUED | OUTPATIENT
Start: 2019-07-07 | End: 2019-07-08 | Stop reason: HOSPADM

## 2019-07-07 RX ORDER — HYDROCHLOROTHIAZIDE 12.5 MG/1
12.5 TABLET ORAL DAILY
Status: DISCONTINUED | OUTPATIENT
Start: 2019-07-07 | End: 2019-07-08 | Stop reason: HOSPADM

## 2019-07-07 RX ORDER — HYDRALAZINE HYDROCHLORIDE 20 MG/ML
10 INJECTION INTRAMUSCULAR; INTRAVENOUS EVERY 6 HOURS PRN
Status: DISCONTINUED | OUTPATIENT
Start: 2019-07-07 | End: 2019-07-08 | Stop reason: HOSPADM

## 2019-07-07 RX ORDER — LORAZEPAM 1 MG/1
1 TABLET ORAL ONCE AS NEEDED
Status: COMPLETED | OUTPATIENT
Start: 2019-07-07 | End: 2019-07-08

## 2019-07-07 RX ORDER — FERROUS SULFATE 325(65) MG
325 TABLET, DELAYED RELEASE (ENTERIC COATED) ORAL DAILY
Status: DISCONTINUED | OUTPATIENT
Start: 2019-07-08 | End: 2019-07-08 | Stop reason: HOSPADM

## 2019-07-07 RX ADMIN — LOSARTAN POTASSIUM 25 MG: 25 TABLET ORAL at 10:07

## 2019-07-07 RX ADMIN — HYDRALAZINE HYDROCHLORIDE 10 MG: 20 INJECTION, SOLUTION INTRAMUSCULAR; INTRAVENOUS at 05:07

## 2019-07-07 RX ADMIN — ATORVASTATIN CALCIUM 80 MG: 40 TABLET, FILM COATED ORAL at 09:07

## 2019-07-07 RX ADMIN — ASPIRIN 81 MG: 81 TABLET, COATED ORAL at 08:07

## 2019-07-07 RX ADMIN — HYDROCHLOROTHIAZIDE 12.5 MG: 12.5 TABLET ORAL at 10:07

## 2019-07-07 RX ADMIN — IRON SUCROSE 300 MG: 20 INJECTION, SOLUTION INTRAVENOUS at 08:07

## 2019-07-07 RX ADMIN — AMLODIPINE BESYLATE 10 MG: 5 TABLET ORAL at 10:07

## 2019-07-07 NOTE — PLAN OF CARE
Problem: Adult Inpatient Plan of Care  Goal: Plan of Care Review  Outcome: Ongoing (interventions implemented as appropriate)  Plan of care reviewed patient verbalized understanding. Medications administered. IV Iron Sucrose infused. Neurological assessment q4hrs. Cardiac monitoring NSR. Safety maintained bed in lowest position, call bell within reach, bed alarm on.

## 2019-07-07 NOTE — PT/OT/SLP EVAL
"Speech Language Pathology Evaluation  Cognitive/Bedside Swallow    Patient Name:  Maru Camacho   MRN:  [id number]  Admitting Diagnosis: Cerebral infarction due to embolism of right middle cerebral artery    Recommendations:                  General Recommendations:  Follow-up not indicated  Diet recommendations:  Regular, Thin   Aspiration Precautions: HOB to 90 degrees   General Precautions: Standard,    Communication strategies:  none    History:     Past Medical History:   Diagnosis Date    Hypertension     Seizures        Past Surgical History:   Procedure Laterality Date    DILATION AND CURETTAGE OF UTERUS         Social History: Patient lives with  and 2 adult children at home.    Prior Intubation HX:  Not indicated this admit    Modified Barium Swallow: Not indicated this admit    Chest X-Rays:   FINDINGS:  The lungs are clear. The cardiac silhouette is mildly enlarged.  Mediastinum unremarkable.  No effusion or pneumothorax.  The remaining osseous structures and soft tissues are within normal limits.            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     Subjective     Principal Problem:Cerebral infarction due to embolism of right middle cerebral artery     Chief Complaint:        Chief Complaint   Patient presents with    Numbness       "I feel like numbness to the left side of my face and my left arm and it started when I got out of the shower at 8:55am"         HPI: Ms. Maru Camacho is a 44 y/o female with PMH of hypertension and seizures (last one 21 years ago). She presents with complaint of sudden onset left-sided numbness, heaviness, and pain to her arm and face around 0830 on 7/6/19. She states that she was in her usual state of health when she woke up and she took her BP meds as scheduled. Symptoms started after taking a shower and also include headache " behind her right eye and lightheadedness. She denies fever, blurry vision, slurred speech, facial drooping, chest pain, shortness of breath, nausea, vomiting, abdominal pain, leg swelling or pain, difficulty ambulating, or seizure activity. Patient states that she has had high blood pressure since she was 24. She was seen in the ED last weekend for hypertensive urgency where she was prescribed clonidine as needed. She denies tobacco use and uses alcohol occasionally. She denies history of stroke or MI in her family. She lives in [place], [place] with her . Her PCP is Dr. Levine.     ED workup revealed hgb 8.4, labs otherwise unremarkable. Chest x-ray showed cardiomegaly without acute intrathoracic process. CT head showed no intracranial findings. /137, vitals otherwise stable. Telestroke consult by Dr. Guerin recommended admission for stroke workup. Not a tPA candidate due to minimal non-disabling symptoms. She was given aspirin 325 mg, clonidine 0.3 mg, ativan 1 mg, and  cc. She was admitted to [facility].          Past Medical History:   Diagnosis Date    Hypertension      Seizures             Patient goals: to go home    Pain/Comfort:  · Pain Rating 1: 0/10    Objective:     Cognitive Status:    Attention No obvious deficits observed Attention to task at 100%  Orientation Oriented x4  Memory Immediate Recall Repeated list of 5 words at 100% accuracy, DelayedRepeated list of 5 words with 3 minute delay at 100% accuracy. and recall general information 100%  Problem Solving Categories named 16 items in verbally presented category in approx 45 seconds  Simple calculation mental calculation of money totals (addition and subtraction)  at 100%      Receptive Language:   Comprehension:      WFL    Pragmatics:    WFL    Expressive Language:  Verbal:    Conversational speech WFL        Motor Speech:  WFL    Voice:   WFL    Visual-Spatial:  WFL      Oral Musculature Evaluation  · Oral  Musculature: WFL  · Dentition: present and adequate  · Mucosal Quality: good  · Mandibular Strength and Mobility: WFL  · Oral Labial Strength and Mobility: WFL  · Lingual Strength and Mobility: WFL  · Buccal Strength and Mobility: WFL  · Volitional Cough: strong  · Volitional Swallow: WFL  · Voice Prior to PO Intake: clear vocal quality     Bedside Swallow Eval:   Consistencies Assessed:  · Thin liquids approx 4 oz H2O via self regulated straw sips  · Puree approx 2 tsp via self-regulated tsp bites  · Soft solids approx 1 oz via self regulated tsp bites  · Solids 2 rosa crackers via self-regulated tsp bites     Oral Phase:   · WFL    Pharyngeal Phase:   · no overt clinical signs/symptoms of aspiration  · no overt clinical signs/symptoms of pharyngeal dysphagia    Compensatory Strategies  · None    Treatment: ST provided bedside swallow study and [facility] Mental Status Examination. Pt pleasant and compliant.     Assessment:     Maru Camacho is a 43 y.o. female with a diagnosis of TIA.  She presents with oropharyngeal and speech/cognitive language skills WFL this assessment. Oral [facility] exam revealed labial/lingual/buccal strength WFL. Pt was observed to consumed PO trials of thin, puree, soft solid, and solid, as well as at least 4 pills presented by XAVIER Lopez. Pt swallowed all pills at once with straw sips of water. No anterior loss noted during or following swallows of any presented consistency. Oral cavity clear post-swallows. Pt denied odynophagia, denied globus sensation following during & following swallows. ST educated pt on s/s of dysphagia, and pt verbalized understanding. ST also presented [facility] Mental Status Examination. Pt's motor speech/intelligibility/fluency WFL during exam. Pt with overall score of 29 out of possible 30, indicating cognitive language skills WNL. Recommend: 1) Continue current PO diet of regular/thin consistencies; 2) whole pills OK. Further ST services  not warranted at this time. Pt and XAVIER Lopez were notified of these recommendations. Dr. Romero was notified of these recommendations via instant message.     Goals:   Multidisciplinary Problems     SLP Goals        Problem: SLP Goal    Goal Priority Disciplines Outcome   SLP Goal     SLP Ongoing (interventions implemented as appropriate)   Description:  1. Pt will participate in BSS and in speech/cognitive language evaluation.--GOAL MET                    Plan:     · Patient to be seen:      · Plan of Care expires:     · Plan of Care reviewed with:  patient   · SLP Follow-Up:  No       Discharge recommendations:      Barriers to Discharge:  None    Time Tracking:     SLP Treatment Date:   07/07/19  Speech Start Time:  1010  Speech Stop Time:  1045     Speech Total Time (min):  35 min    Billable Minutes: Eval 20  and Eval Swallow and Oral Function 15    Patti Castillo CCC-SLP  07/07/2019

## 2019-07-07 NOTE — PLAN OF CARE
Problem: Adult Inpatient Plan of Care  Goal: Plan of Care Review  Outcome: Revised  Pt stable. NO distress noted. POC reviewed with pt. Pt verbalized understanding. Pt remains SB on the monitor. NO true red alarms noted. Pt denied pain. Neuro checks Q 4. Left sided weakness improved. Blood pressure monitored closely. Fall precautions maintained. Bed in lowest position, call light in reach and bed alarm on.

## 2019-07-07 NOTE — SUBJECTIVE & OBJECTIVE
Interval History: Patient asymptomatic since admission, no overnight events or acute complaints. Left-sided weakness and numbness resolved. Neuro checks stable. Awaiting MRI/MRA/echo tomorrow.    Review of Systems   Constitutional: Negative for chills, diaphoresis, fatigue and fever.   Respiratory: Negative for cough and shortness of breath.    Cardiovascular: Negative for chest pain, palpitations and leg swelling.   Gastrointestinal: Negative for abdominal pain, constipation, diarrhea and nausea.   Genitourinary: Negative for difficulty urinating, dysuria and hematuria.   Musculoskeletal: Negative for arthralgias, back pain, gait problem, myalgias and neck pain.   Skin: Negative for color change, pallor, rash and wound.   Neurological: Negative for dizziness, weakness, light-headedness, numbness and headaches.   Hematological: Negative for adenopathy. Does not bruise/bleed easily.        +Craving ice   Psychiatric/Behavioral: Negative for confusion. The patient is not nervous/anxious.      Objective:     Vital Signs (Most Recent):  Temp: 96.2 °F (35.7 °C) (07/07/19 0457)  Pulse: 70 (07/07/19 0834)  Resp: 19 (07/07/19 0834)  BP: (!) 166/84 (07/07/19 0749)  SpO2: 97 % (07/07/19 0834) Vital Signs (24h Range):  Temp:  [96.1 °F (35.6 °C)-97.1 °F (36.2 °C)] 96.2 °F (35.7 °C)  Pulse:  [57-70] 70  Resp:  [17-25] 19  SpO2:  [96 %-100 %] 97 %  BP: (138-235)/() 166/84     Weight: 100 kg (220 lb 7.4 oz)  Body mass index is 37.84 kg/m².    Intake/Output Summary (Last 24 hours) at 7/7/2019 0929  Last data filed at 7/7/2019 0858  Gross per 24 hour   Intake 375 ml   Output 650 ml   Net -275 ml      Physical Exam   Constitutional: She is oriented to person, place, and time. No distress.   Obese   Eyes: Pupils are equal, round, and reactive to light. Conjunctivae are normal.   Neck: Normal range of motion. Neck supple. No JVD present. No tracheal deviation present.   Cardiovascular: Normal rate, regular rhythm and intact  distal pulses.   Murmur heard.  Pulmonary/Chest: Effort normal and breath sounds normal. No respiratory distress.   Abdominal: Soft. Bowel sounds are normal. She exhibits no distension. There is no tenderness.   Musculoskeletal: She exhibits no edema or tenderness.   Neurological: She is alert and oriented to person, place, and time. She has normal strength. No sensory deficit. She exhibits normal muscle tone. She displays no seizure activity. Gait normal. GCS eye subscore is 4. GCS verbal subscore is 5. GCS motor subscore is 6.   Skin: Skin is warm and dry. Capillary refill takes less than 2 seconds. She is not diaphoretic. No erythema.   Psychiatric: She has a normal mood and affect. Her behavior is normal.   Nursing note and vitals reviewed.      Significant Labs: All pertinent labs within the past 24 hours have been reviewed.    Significant Imaging: I have reviewed all pertinent imaging results/findings within the past 24 hours.

## 2019-07-07 NOTE — PLAN OF CARE
Problem: SLP Goal  Goal: SLP Goal  1. Pt will participate in BSS and in speech/cognitive language evaluation.--GOAL MET  Outcome: Ongoing (interventions implemented as appropriate)  ST provided bedside swallow study and speech/cognitive language assessment this evaluation. Oropharyngeal skills WFL this evaluation. Speech/cognitive language skills WFL this evaluation. Pt educated on s/s of dysphagia. Further ST services not warranted at this time.

## 2019-07-07 NOTE — PLAN OF CARE
Problem: Physical Therapy Goal  Goal: Physical Therapy Goal  Outcome: Outcome(s) achieved Date Met: 07/07/19  PT initial evaluation completed. Plan of care and goals established and discussed with patient/family.  Pt does not present c/ any acute PT needs at this time. Pt is independent for functional bed mobility and Independent for transfers; and Independent for gait for 300ft c/ No AD  Pt was extensively educated on health and wellness; modifiable and non-modifiable risk factors for hypertension and stroke; and motivational interviewing to identify and modify lifestyle of stress; diet; medication compliance; physical activity and exercise; and use of community resources available to improve overall wellbeing.     Discharge Recommendation: Home  DME Recommendation: none

## 2019-07-07 NOTE — HOSPITAL COURSE
Patient was admitted for hypertensive urgency and stroke workup. A1C, TSH, LDL all within normal limits. Found to have microcytic iron deficiency anemia and she was given venofer 300 mg IV. Initiated BP regimen with amlodipine 10 mg, losartan 50 mg and hydrochlorothiazide 12.5 mg. Propanolol discontinued due to marginal HR. Echo showed normal LVEF of 55%, grade 1 LVDD. MRI/MRA brain ordered and patient was premedicated with ativan, refused to continue with MRI due to claustrophobia. CTA head/neck ordered and patient reported iodine contrast allergy. She did not want to premedicate for CTA and decided to proceed with carotid US only which showed 50-69% stenosis left ICA, no stenosis right ICA. Patient was started on ASA 81 mg daily and pravastatin 40 mg daily for secondary stroke prevention. She was discharged to home in good condition with referrals for [facility]rology follow up.

## 2019-07-07 NOTE — PT/OT/SLP EVAL
Physical Therapy Evaluation and Discharge Note    Patient Name:  Maru Camacho   MRN:  [id number]    Recommendations:     Discharge Recommendations:  home   Discharge Equipment Recommendations: none   Barriers to discharge: None    Assessment:     Maru Camacho is a 43 y.o. female admitted with a medical diagnosis of Cerebral infarction due to embolism of right middle cerebral artery. .  At this time, patient is functioning at their prior level of function and does not require further acute PT services. She presents with the following impairments/functional limitations:  Mild Weakness in L LE.  PT initial evaluation completed. Plan of care and goals established and discussed with patient/family.  Pt does not present c/ any acute PT needs at this time. Pt is independent for functional bed mobility and Independent for transfers; and Independent for gait for >300ft c/ No AD    Rehab Prognosis: Good; patient would benefit from acute skilled PT services to address these deficits and reach maximum level of function.    Recent Surgery: * No surgery found *      Plan:     During this hospitalization, patient to be seen (PT DC) to address the identified rehab impairments via   and progress toward the following goals:    · Plan of Care Expires:  07/07/19    Subjective     Chief Complaint: None  Patient/Family Comments/goals: wants to get OOB and walk  Pain/Comfort:  · Pain Rating 1: 0/10  · Pain Rating Post-Intervention 1: 0/10    Patients cultural, spiritual, Jewish conflicts given the current situation: no    Living Environment:  Pt lives c/ spouse in [facility] w/ [facility] & [facility].  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  Equipment used at home: none.  DME owned (not currently used): none.  Upon discharge, patient will have assistance from family.    Objective:     Communicated with RNJessica prior to session.  Patient found supine with  bed alarm, telemetry  upon PT entry to room.    General Precautions: Standard, fall   Orthopedic Precautions:N/A   Braces: N/A     Exams:  · Cognitive Exam:  Patient is oriented to Person, Place, Time and Situation  · Gross Motor Coordination:  WFL  · Postural Exam:  Patient presented with the following abnormalities:    · -       No postural abnormalities identified  · Sensation:    · -       Intact to light touch  · Tone: [place] B LE  · RLE ROM: WNL  · RLE Strength: WNL  · LLE ROM: WNL  · LLE Strength: 4/5 in hip flexion and knee extension    Functional Mobility:  · Bed Mobility:     · Rolling Left:  independence  · Scooting: independence  · Supine to Sit: independence  · Transfers:     · Sit to Stand:  independence with no AD  · Gait: 300ft; 200ft c/ no AD; pt able to complete task c/ divided attention and carrying out cognitive taks c/out LOB or buckling; negotatiated turns and doorways well.  · Balance: dynamic balance: good      Therapeutic Activities and Exercises:  PT sharadal completed c/ progressive mobility and gait training as detailed above.  Pt was extensively educated on health and wellness; modifiable and non-modifiable risk factors for hypertension and stroke; and motivational interviewing to identify and modify lifestyle of stress; diet; medication compliance; physical activity and exercise; and use of community resources available to improve overall wellbeing.     AM-PAC 6 CLICK MOBILITY  Total Score:24     Patient left up in bed with call button in reach, RN notified and family present.    GOALS:   Multidisciplinary Problems     Physical Therapy Goals     Not on file          Multidisciplinary Problems (Resolved)        Problem: Physical Therapy Goal    Goal Priority Disciplines Outcome Goal Variances Interventions   Physical Therapy Goal   (Resolved)     PT, PT/OT Outcome(s) achieved                     History:     Past Medical History:   Diagnosis Date    Hypertension     Seizures         Past Surgical History:   Procedure Laterality Date    DILATION AND CURETTAGE OF UTERUS         Time Tracking:     PT Received On: 07/07/19  PT Start Time: 1241     PT Stop Time: 1326  PT Total Time (min): 45 min     Billable Minutes: Evaluation 10, Gait Training 10 and Therapeutic Activity 25      Shira Marie, PT  07/07/2019

## 2019-07-07 NOTE — PROGRESS NOTES
[facility]  Progress Note    Patient Name: aMru Camacho  MRN: [id number]  Patient Class: OP- Observation   Admission Date: 7/6/2019  Length of Stay: 1 days  Attending Physician: Sarah Maier*  Primary Care Provider: Primary Doctor Bria      Subjective:     Principal Problem:Cerebral infarction due to embolism of right middle cerebral artery      HPI:  Ms. Maru Camacho is a 42 y/o female with PMH of hypertension and seizures (last one 21 years ago). She presents with complaint of sudden onset left-sided numbness, heaviness, and pain to her arm and face around 0830 this morning. She states that she was in her usual state of health when she woke up and she took her BP meds as scheduled. Symptoms started after taking a shower and also include headache behind her right eye and lightheadedness. She denies fever, blurry vision, slurred speech, facial drooping, chest pain, shortness of breath, nausea, vomiting, abdominal pain, leg swelling or pain, difficulty ambulating, or seizure activity. Patient states that she has had high blood pressure since she was 24. She was seen in the ED last weekend for hypertensive urgency where she was prescribed clonidine as needed. She denies tobacco use and uses alcohol occasionally. She denies history of stroke or MI in her family. She lives in [place], [place] with her . Her PCP is Dr. Levine.    ED workup revealed hgb 8.4, labs otherwise unremarkable. Chest x-ray showed cardiomegaly without acute intrathoracic process. CT head showed no intracranial findings. /137, vitals otherwise stable. Telestroke consult by Dr. Guerin recommended admission for stroke workup. Not a tPA candidate due to minimal non-disabling symptoms. She was given aspirin 325 mg, clonidine 0.3 mg, ativan 1 mg, and  cc. She was admitted to [facility].    Overview/Hospital Course:  Patient admitted for hypertensive urgency and stroke  workup. A1C, TSH, LDL all within normal limits. Found to have microcytic iron deficiency anemia.    Interval History: Patient asymptomatic since admission, no overnight events or acute complaints. Left-sided weakness and numbness resolved. Neuro checks stable. Awaiting MRI/MRA/echo tomorrow.    Review of Systems   Constitutional: Negative for chills, diaphoresis, fatigue and fever.   Respiratory: Negative for cough and shortness of breath.    Cardiovascular: Negative for chest pain, palpitations and leg swelling.   Gastrointestinal: Negative for abdominal pain, constipation, diarrhea and nausea.   Genitourinary: Negative for difficulty urinating, dysuria and hematuria.   Musculoskeletal: Negative for arthralgias, back pain, gait problem, myalgias and neck pain.   Skin: Negative for color change, pallor, rash and wound.   Neurological: Negative for dizziness, weakness, light-headedness, numbness and headaches.   Hematological: Negative for adenopathy. Does not bruise/bleed easily.        +Craving ice   Psychiatric/Behavioral: Negative for confusion. The patient is not nervous/anxious.      Objective:     Vital Signs (Most Recent):  Temp: 96.2 °F (35.7 °C) (07/07/19 0457)  Pulse: 70 (07/07/19 0834)  Resp: 19 (07/07/19 0834)  BP: (!) 166/84 (07/07/19 0749)  SpO2: 97 % (07/07/19 0834) Vital Signs (24h Range):  Temp:  [96.1 °F (35.6 °C)-97.1 °F (36.2 °C)] 96.2 °F (35.7 °C)  Pulse:  [57-70] 70  Resp:  [17-25] 19  SpO2:  [96 %-100 %] 97 %  BP: (138-235)/() 166/84     Weight: 100 kg (220 lb 7.4 oz)  Body mass index is 37.84 kg/m².    Intake/Output Summary (Last 24 hours) at 7/7/2019 0929  Last data filed at 7/7/2019 0858  Gross per 24 hour   Intake 375 ml   Output 650 ml   Net -275 ml      Physical Exam   Constitutional: She is oriented to person, place, and time. No distress.   Obese   Eyes: Pupils are equal, round, and reactive to light. Conjunctivae are normal.   Neck: Normal range of motion. Neck supple. No JVD  present. No tracheal deviation present.   Cardiovascular: Normal rate, regular rhythm and intact distal pulses.   Murmur heard.  Pulmonary/Chest: Effort normal and breath sounds normal. No respiratory distress.   Abdominal: Soft. Bowel sounds are normal. She exhibits no distension. There is no tenderness.   Musculoskeletal: She exhibits no edema or tenderness.   Neurological: She is alert and oriented to person, place, and time. She has normal strength. No sensory deficit. She exhibits normal muscle tone. She displays no seizure activity. Gait normal. GCS eye subscore is 4. GCS verbal subscore is 5. GCS motor subscore is 6.   Skin: Skin is warm and dry. Capillary refill takes less than 2 seconds. She is not diaphoretic. No erythema.   Psychiatric: She has a normal mood and affect. Her behavior is normal.   Nursing note and vitals reviewed.      Significant Labs: All pertinent labs within the past 24 hours have been reviewed.    Significant Imaging: I have reviewed all pertinent imaging results/findings within the past 24 hours.      Assessment/Plan:      * Cerebral infarction due to embolism of right middle cerebral artery  Hypertensive urgency/TIA  - Asymptomatic since admission, no focal neuro deficits noted  - TSH, LDL, HbA1c all WNL  - Continue daily ASA, statin  - MRI/MRA, 2D ECHO (to be performed tomorrow)  - Monitor on telemetry   - Resume home BP meds  - PT/OT/ST consults  - Neuro checks q4h  - Low sodium cardiac diet    Microcytic anemia  -Iron studies show severe iron deficiency  -Patient has menorrhagia at times, no active bleeding  -Venofer 300 mg IV infusion  -Start ferrous sulfate 325 mg daily      Hypertensive urgency  Essential hypertension  -228. Patient takes amlodipine 10 mg daily and atenolol 100 mg BID at home. Intolerant of lisinopril. Borderline bradycardia noted on telemetry.    -Resume home amlodipine  -Will start on losartan-HCTZ 25-12.5 daily and uptitrate as needed  -Hydralazine  prn for SBP >180  -Low sodium diet and reinforce HTN education        VTE Risk Mitigation (From admission, onward)        Ordered     enoxaparin injection 40 mg  Daily      07/06/19 1736     IP VTE HIGH RISK PATIENT  Once      07/06/19 1736     Place sequential compression device  Until discontinued      07/06/19 1736          Yesi Rivera PA-C  Department of Hospital Medicine   [facility]

## 2019-07-07 NOTE — ASSESSMENT & PLAN NOTE
-Iron studies show severe iron deficiency  -Patient has menorrhagia at times, no active bleeding  -Venofer 300 mg IV infusion  -Start ferrous sulfate 325 mg daily

## 2019-07-07 NOTE — ASSESSMENT & PLAN NOTE
Hypertensive urgency/TIA  - Asymptomatic since admission, no focal neuro deficits noted  - TSH, LDL, HbA1c all WNL  - Continue daily ASA, statin  - MRI/MRA, 2D ECHO (to be performed tomorrow)  - Monitor on telemetry   - Resume home BP meds  - PT/OT/ST consults  - Neuro checks q4h  - Low sodium cardiac diet

## 2019-07-07 NOTE — ASSESSMENT & PLAN NOTE
Essential hypertension  -228. Patient takes amlodipine 10 mg daily and atenolol 100 mg BID at home. Intolerant of lisinopril. Borderline bradycardia noted on telemetry.    -Resume home amlodipine  -Will start on losartan-HCTZ 25-12.5 daily and uptitrate as needed  -Hydralazine prn for SBP >180  -Low sodium diet and reinforce HTN education

## 2019-07-07 NOTE — PT/OT/SLP PROGRESS
Occupational Therapy  SCREEN    Patient Name:  Maru Camacho   MRN:  [id number]    Pt found sitting EOB & agreeable to OT this AM. Pt lives w/ spouse in [place] w/ 4STE & RHR; t/s combo. PLOF (I) w/o DME w/ all fxnl tasks incl standing tub showers, IADLs, driving & working as an  at [employer].   Pt reports complete resolution of L sided weakness of face/UE & resumption of fxnl status at PLOF: (I).  Edu/tx re: role of OT, signs of TIA/CVA, general safety techs. Pt verbalized understanding & stated that she doesn't require OT svcs.    Per screen, pt at baseline (I) w/o residual deficits & no further OT svcs indicated at this time.  OT to d/c eval/tx orders this date.    CHARISSA Chris  7/7/2019

## 2019-07-08 VITALS
OXYGEN SATURATION: 94 % | TEMPERATURE: 97 F | HEART RATE: 88 BPM | WEIGHT: 220.44 LBS | BODY MASS INDEX: 37.63 KG/M2 | DIASTOLIC BLOOD PRESSURE: 83 MMHG | SYSTOLIC BLOOD PRESSURE: 134 MMHG | HEIGHT: 64 IN | RESPIRATION RATE: 20 BRPM

## 2019-07-08 PROBLEM — I63.411 CEREBRAL INFARCTION DUE TO EMBOLISM OF RIGHT MIDDLE CEREBRAL ARTERY: Status: RESOLVED | Noted: 2019-07-06 | Resolved: 2019-07-08

## 2019-07-08 PROBLEM — I51.89 GRADE I DIASTOLIC DYSFUNCTION: Status: ACTIVE | Noted: 2019-07-08

## 2019-07-08 PROBLEM — I16.0 HYPERTENSIVE URGENCY: Status: RESOLVED | Noted: 2019-07-06 | Resolved: 2019-07-08

## 2019-07-08 LAB
ANION GAP SERPL CALC-SCNC: 8 MMOL/L (ref 8–16)
ANISOCYTOSIS BLD QL SMEAR: SLIGHT
AORTIC ROOT ANNULUS: 3.68 CM
AV INDEX (PROSTH): 0.8
AV MEAN GRADIENT: 6 MMHG
AV PEAK GRADIENT: 11 MMHG
AV VALVE AREA: 3.41 CM2
AV VELOCITY RATIO: 0.67
BASOPHILS # BLD AUTO: 0.02 K/UL (ref 0–0.2)
BASOPHILS NFR BLD: 0.4 % (ref 0–1.9)
BSA FOR ECHO PROCEDURE: 2.11 M2
BUN SERPL-MCNC: 8 MG/DL (ref 6–20)
CALCIUM SERPL-MCNC: 9.5 MG/DL (ref 8.7–10.5)
CHLORIDE SERPL-SCNC: 105 MMOL/L (ref 95–110)
CO2 SERPL-SCNC: 25 MMOL/L (ref 23–29)
CREAT SERPL-MCNC: 0.8 MG/DL (ref 0.5–1.4)
CV ECHO LV RWT: 0.6 CM
DIFFERENTIAL METHOD: ABNORMAL
DOP CALC AO PEAK VEL: 1.66 M/S
DOP CALC AO VTI: 38.36 CM
DOP CALC LVOT AREA: 4.3 CM2
DOP CALC LVOT DIAMETER: 2.33 CM
DOP CALC LVOT PEAK VEL: 1.11 M/S
DOP CALC LVOT STROKE VOLUME: 130.66 CM3
DOP CALCLVOT PEAK VEL VTI: 30.66 CM
E WAVE DECELERATION TIME: 456.85 MSEC
E/A RATIO: 0.65
ECHO LV POSTERIOR WALL: 1.34 CM (ref 0.6–1.1)
EOSINOPHIL # BLD AUTO: 0.1 K/UL (ref 0–0.5)
EOSINOPHIL NFR BLD: 2.4 % (ref 0–8)
ERYTHROCYTE [DISTWIDTH] IN BLOOD BY AUTOMATED COUNT: 19.3 % (ref 11.5–14.5)
EST. GFR  (AFRICAN AMERICAN): >60 ML/MIN/1.73 M^2
EST. GFR  (NON AFRICAN AMERICAN): >60 ML/MIN/1.73 M^2
FRACTIONAL SHORTENING: 31 % (ref 28–44)
GLUCOSE SERPL-MCNC: 106 MG/DL (ref 70–110)
HCT VFR BLD AUTO: 32.1 % (ref 37–48.5)
HGB BLD-MCNC: 8.9 G/DL (ref 12–16)
HYPOCHROMIA BLD QL SMEAR: ABNORMAL
INTERVENTRICULAR SEPTUM: 1.35 CM (ref 0.6–1.1)
LA MAJOR: 4.97 CM
LA MINOR: 4.55 CM
LA WIDTH: 4.58 CM
LEFT ATRIUM SIZE: 4.01 CM
LEFT ATRIUM VOLUME INDEX: 36.4 ML/M2
LEFT ATRIUM VOLUME: 74.16 CM3
LEFT INTERNAL DIMENSION IN SYSTOLE: 3.05 CM (ref 2.1–4)
LEFT VENTRICLE DIASTOLIC VOLUME INDEX: 43.66 ML/M2
LEFT VENTRICLE DIASTOLIC VOLUME: 89 ML
LEFT VENTRICLE MASS INDEX: 112 G/M2
LEFT VENTRICLE SYSTOLIC VOLUME INDEX: 17.9 ML/M2
LEFT VENTRICLE SYSTOLIC VOLUME: 36.53 ML
LEFT VENTRICULAR INTERNAL DIMENSION IN DIASTOLE: 4.43 CM (ref 3.5–6)
LEFT VENTRICULAR MASS: 228.57 G
LV LATERAL E/E' RATIO: 7.89 M/S
LYMPHOCYTES # BLD AUTO: 1.4 K/UL (ref 1–4.8)
LYMPHOCYTES NFR BLD: 26 % (ref 18–48)
MCH RBC QN AUTO: 18.2 PG (ref 27–31)
MCHC RBC AUTO-ENTMCNC: 27.7 G/DL (ref 32–36)
MCV RBC AUTO: 66 FL (ref 82–98)
MONOCYTES # BLD AUTO: 0.3 K/UL (ref 0.3–1)
MONOCYTES NFR BLD: 5.9 % (ref 4–15)
MV PEAK A VEL: 1.09 M/S
MV PEAK E VEL: 0.71 M/S
NEUTROPHILS # BLD AUTO: 3.5 K/UL (ref 1.8–7.7)
NEUTROPHILS NFR BLD: 65.3 % (ref 38–73)
PISA TR MAX VEL: 2.16 M/S
PLATELET # BLD AUTO: 319 K/UL (ref 150–350)
PLATELET BLD QL SMEAR: ABNORMAL
PMV BLD AUTO: ABNORMAL FL (ref 9.2–12.9)
POIKILOCYTOSIS BLD QL SMEAR: SLIGHT
POLYCHROMASIA BLD QL SMEAR: ABNORMAL
POTASSIUM SERPL-SCNC: 3.9 MMOL/L (ref 3.5–5.1)
PULM VEIN S/D RATIO: 0.85
PV PEAK D VEL: 0.73 M/S
PV PEAK S VEL: 0.62 M/S
PV PEAK VELOCITY: 1.13 CM/S
RA MAJOR: 3.53 CM
RA PRESSURE: 3 MMHG
RBC # BLD AUTO: 4.89 M/UL (ref 4–5.4)
RIGHT VENTRICULAR END-DIASTOLIC DIMENSION: 2.52 CM
SODIUM SERPL-SCNC: 138 MMOL/L (ref 136–145)
STJ: 3.3 CM
TARGETS BLD QL SMEAR: ABNORMAL
TDI LATERAL: 0.09 M/S
TR MAX PG: 19 MMHG
TV REST PULMONARY ARTERY PRESSURE: 22 MMHG
WBC # BLD AUTO: 5.42 K/UL (ref 3.9–12.7)

## 2019-07-08 PROCEDURE — 97802 MEDICAL NUTRITION INDIV IN: CPT

## 2019-07-08 PROCEDURE — 85025 COMPLETE CBC W/AUTO DIFF WBC: CPT

## 2019-07-08 PROCEDURE — 36415 COLL VENOUS BLD VENIPUNCTURE: CPT

## 2019-07-08 PROCEDURE — 25000003 PHARM REV CODE 250: Performed by: FAMILY MEDICINE

## 2019-07-08 PROCEDURE — 25000003 PHARM REV CODE 250: Performed by: PHYSICIAN ASSISTANT

## 2019-07-08 PROCEDURE — 80048 BASIC METABOLIC PNL TOTAL CA: CPT

## 2019-07-08 PROCEDURE — G0378 HOSPITAL OBSERVATION PER HR: HCPCS

## 2019-07-08 RX ORDER — FERROUS SULFATE 325(65) MG
325 TABLET, DELAYED RELEASE (ENTERIC COATED) ORAL DAILY
Refills: 0 | COMMUNITY
Start: 2019-07-08 | End: 2023-08-28

## 2019-07-08 RX ORDER — ASPIRIN 81 MG/1
81 TABLET ORAL DAILY
Refills: 0 | COMMUNITY
Start: 2019-07-09 | End: 2019-08-22

## 2019-07-08 RX ORDER — PRAVASTATIN SODIUM 40 MG/1
40 TABLET ORAL DAILY
Qty: 90 TABLET | Refills: 3 | Status: SHIPPED | OUTPATIENT
Start: 2019-07-08 | End: 2023-08-28

## 2019-07-08 RX ORDER — AMLODIPINE BESYLATE 10 MG/1
10 TABLET ORAL DAILY
Qty: 30 TABLET | Refills: 3 | Status: SHIPPED | OUTPATIENT
Start: 2019-07-08 | End: 2019-09-16 | Stop reason: ALTCHOICE

## 2019-07-08 RX ORDER — LOSARTAN POTASSIUM AND HYDROCHLOROTHIAZIDE 12.5; 5 MG/1; MG/1
1 TABLET ORAL DAILY
Qty: 90 TABLET | Refills: 3 | Status: SHIPPED | OUTPATIENT
Start: 2019-07-08 | End: 2019-08-22

## 2019-07-08 RX ORDER — LOSARTAN POTASSIUM 50 MG/1
50 TABLET ORAL DAILY
Status: DISCONTINUED | OUTPATIENT
Start: 2019-07-09 | End: 2019-07-08 | Stop reason: HOSPADM

## 2019-07-08 RX ADMIN — HYDROCHLOROTHIAZIDE 12.5 MG: 12.5 TABLET ORAL at 10:07

## 2019-07-08 RX ADMIN — FERROUS SULFATE TAB EC 325 MG (65 MG FE EQUIVALENT) 325 MG: 325 (65 FE) TABLET DELAYED RESPONSE at 10:07

## 2019-07-08 RX ADMIN — LOSARTAN POTASSIUM 25 MG: 25 TABLET ORAL at 10:07

## 2019-07-08 RX ADMIN — ASPIRIN 81 MG: 81 TABLET, COATED ORAL at 10:07

## 2019-07-08 RX ADMIN — LORAZEPAM 1 MG: 1 TABLET ORAL at 08:07

## 2019-07-08 RX ADMIN — AMLODIPINE BESYLATE 10 MG: 5 TABLET ORAL at 10:07

## 2019-07-08 NOTE — PLAN OF CARE
Re:  Maru Camacho, WORK / SCHOOL EXCUSE    Date: 07/08/2019           [facility]       Tarik Cuenca MD, [facility]       MD Ritu Crowe, DAVID Muniz, ENDER  [address] [place] [address]  Office: [phone]  Fax: [phone]     To whom it may concern:    Ms. Maru Camacho has been hospitalized at the [facility] since 7/6/2019.  Please excuse the patient from duties.  Patient may return on 7/11/19.  No restrictions.     Please contact me if you have any questions.                __________________________  Joann Carranza PA-C

## 2019-07-08 NOTE — PROGRESS NOTES
OMAR reviewed with Ms. Camacho. Time allowed for questions. She verbalizes understanding. Prescriptions received from [facility]. Transport will be requested.

## 2019-07-08 NOTE — PLAN OF CARE
Patient AAOx3  Lives at home with significant other  No dme or home health    PCP is Dr. Pereira but open to priority care clinic- will email priority care clinic to see if they will do followup    Informed patient she was in observation status. Patient and significant other verbalize understanding.    This  put name on white board and explained blue discharge folder to patient. Discharge planning brochure and/or business card given to patient.  Patient verbalized understanding.       07/08/19 0902   Discharge Assessment   Assessment Type Discharge Planning Assessment   Confirmed/corrected address and phone number on facesheet? Yes   Assessment information obtained from? Patient   Prior to hospitilization cognitive status: Alert/Oriented   Prior to hospitalization functional status: Independent   Current cognitive status: Alert/Oriented   Current Functional Status: Independent   Lives With significant other   Able to Return to Prior Arrangements yes   Is patient able to care for self after discharge? Yes   Patient's perception of discharge disposition home or selfcare   Readmission Within the Last 30 Days no previous admission in last 30 days   Patient currently being followed by outpatient case management? No   Equipment Currently Used at Home none   Do you have any problems affording any of your prescribed medications? No   Is the patient taking medications as prescribed? yes   Does the patient have transportation home? Yes   Discharge Plan A Home;Home with family   DME Needed Upon Discharge  none   Patient/Family in Agreement with Plan yes     Teresa Arce RN, CCM, CMSRN  RN Transition Navigator  [phone]

## 2019-07-08 NOTE — PLAN OF CARE
Problem: Adult Inpatient Plan of Care  Goal: Plan of Care Review  Outcome: Ongoing (interventions implemented as appropriate)  Recommendation:   1. Continue ti encourage intake at meals as tolerated.   2. Encourage diet compliance    Goals:   Pt will consume at least 50-75% intake at meals  Nutrition Goal Status: new  Communication of RD Recs: reviewed with RN

## 2019-07-08 NOTE — DISCHARGE SUMMARY
[facility]  Discharge Summary      Patient Name: Maru Camacho  MRN: [id number]  Admission Date: 7/6/2019  Hospital Length of Stay: 1 days  Discharge Date and Time: No discharge date for patient encounter.  Attending Physician: Sarah Maier*   Discharging Provider: Yesi Rivera PA-C  Primary Care Provider: Primary Doctor No      HPI:   Ms. Maru Camacho is a 44 y/o female with PMH of hypertension and seizures (last one 21 years ago). She presents with complaint of sudden onset left-sided numbness, heaviness, and pain to her arm and face around 0830 this morning. She states that she was in her usual state of health when she woke up and she took her BP meds as scheduled. Symptoms started after taking a shower and also include headache behind her right eye and lightheadedness. She denies fever, blurry vision, slurred speech, facial drooping, chest pain, shortness of breath, nausea, vomiting, abdominal pain, leg swelling or pain, difficulty ambulating, or seizure activity. Patient states that she has had high blood pressure since she was 24. She was seen in the ED last weekend for hypertensive urgency where she was prescribed clonidine as needed. She denies tobacco use and uses alcohol occasionally. She denies history of stroke or MI in her family. She lives in [place], [place] with her . Her PCP is Dr. Levine.    ED workup revealed hgb 8.4, labs otherwise unremarkable. Chest x-ray showed cardiomegaly without acute intrathoracic process. CT head showed no intracranial findings. /137, vitals otherwise stable. Telestroke consult by Dr. Guerin recommended admission for stroke workup. Not a tPA candidate due to minimal non-disabling symptoms. She was given aspirin 325 mg, clonidine 0.3 mg, ativan 1 mg, and  cc. She was admitted to [facility].    * No surgery found *      Hospital Course:   Patient was admitted for hypertensive urgency and  stroke workup. A1C, TSH, LDL all within normal limits. Found to have microcytic iron deficiency anemia and she was given venofer 300 mg IV. Initiated BP regimen with amlodipine 10 mg, losartan 50 mg and hydrochlorothiazide 12.5 mg. Propanolol discontinued due to marginal HR. Echo showed normal LVEF of 55%, grade 1 LVDD. MRI/MRA brain ordered and patient was premedicated with ativan, refused to continue with MRI due to claustrophobia. CTA head/neck ordered and patient reported iodine contrast allergy. She did not want to premedicate for CTA and decided to proceed with carotid US only which showed 50-69% stenosis left ICA, no stenosis right ICA. Patient was started on ASA 81 mg daily and pravastatin 40 mg daily for secondary stroke prevention. She was discharged to home in good condition with [facility]rology follow up.     Consults:   Consults (From admission, onward)        Status Ordering Provider     Inpatient consult to Registered Dietitian/Nutritionist  Once     Provider:  (Not yet assigned)    Completed VAN JANE     Inpatient consult to Telemedicine-Stroke  Once     Provider:  Santana Guerin MD    Completed LUIS FRAZIER III     IP consult to case management/social work  Once     Provider:  (Not yet assigned)    Acknowledged VAN JANE          No new Assessment & Plan notes have been filed under this hospital service since the last note was generated.  Service: Hospital Medicine    Final Active Diagnoses:    Diagnosis Date Noted POA    Grade I diastolic dysfunction [I51.9] 07/08/2019 Yes    TIA (transient ischemic attack) [G45.9] 07/07/2019 Yes    Essential hypertension [I10] 07/06/2019 Yes     Chronic    Microcytic anemia [D50.9] 07/06/2019 Yes      Problems Resolved During this Admission:    Diagnosis Date Noted Date Resolved POA    PRINCIPAL PROBLEM:  Cerebral infarction due to embolism of right middle cerebral artery [I63.411]  07/06/2019 07/08/2019 Yes    Hypertensive urgency [I16.0] 07/06/2019 07/08/2019 Yes       Discharged Condition: good    Disposition: Home or Self Care    Follow Up:  Follow-up Information     Schedule an appointment as soon as possible for a visit with Primary Doctor No.    Why:  Hospital follow up               Patient Instructions:      Ambulatory Referral to [facility]   Referral Priority: Routine Referral Type: Consultation   Referral Reason: Specialty Services Required   Number of Visits Requested: 1     Ambulatory Referral to Neurology   Referral Priority: Routine Referral Type: Consultation   Referral Reason: Specialty Services Required   Requested Specialty: Neurology   Number of Visits Requested: 1     Diet Cardiac     Notify your health care provider if you experience any of the following:  increased confusion or weakness     Notify your health care provider if you experience any of the following:  persistent dizziness, light-headedness, or visual disturbances     Notify your health care provider if you experience any of the following:  severe persistent headache     Notify your health care provider if you experience any of the following:  severe uncontrolled pain     Activity as tolerated       Significant Diagnostic Studies: Labs: All labs within the past 24 hours have been reviewed    Pending Diagnostic Studies:     None         Medications:  Reconciled Home Medications:      Medication List      START taking these medications    aspirin 81 MG EC tablet  Commonly known as:  ECOTRIN  Take 1 tablet (81 mg total) by mouth once daily.  Start taking on:  7/9/2019     ferrous sulfate 325 (65 FE) MG EC tablet  Take 1 tablet (325 mg total) by mouth once daily.     losartan-hydrochlorothiazide 50-12.5 mg 50-12.5 mg per tablet  Commonly known as:  HYZAAR  Take 1 tablet by mouth once daily.     pravastatin 40 MG tablet  Commonly known as:  PRAVACHOL  Take 1 tablet (40 mg total) by mouth once daily.         CHANGE how you take these medications    amLODIPine 10 MG tablet  Commonly known as:  NORVASC  Take 1 tablet (10 mg total) by mouth once daily.  What changed:  medication strength        CONTINUE taking these medications    butalbital-acetaminophen-caffeine -40 mg -40 mg per tablet  Commonly known as:  FIORICET, ESGIC  Take 1 tablet by mouth every 4 (four) hours as needed for Pain.     ibuprofen 200 MG tablet  Commonly known as:  ADVIL,MOTRIN  Take 2 tablets (400 mg total) by mouth every 6 (six) hours as needed for Pain.        STOP taking these medications    acetaminophen 500 MG tablet  Commonly known as:  TYLENOL     atenolol 50 MG tablet  Commonly known as:  TENORMIN     cloNIDine 0.1 MG tablet  Commonly known as:  CATAPRES     LISINOPRIL ORAL     triamterene-hydrochlorothiazide 37.5-25 mg 37.5-25 mg per tablet  Commonly known as:  MAXZIDE-25            Indwelling Lines/Drains at time of discharge:   Lines/Drains/Airways          None          Time spent on the discharge of patient: 40 minutes  Patient was seen and examined on the date of discharge and determined to be suitable for discharge.    DAVID Diane MD  [facility]  MD Per Donovan MD Ijeoma Innocent-Ituah, MD Fadi Hawawini, MD Elizabeth Knipp, PA-C Brittany Chatman, NP Kristin Stein, PA-C Arekeva Selmon, NP-C  [address] [place] [id number]  Office Phone: [phone]  Office Fax: [phone]

## 2019-07-08 NOTE — PLAN OF CARE
Re:  Maru Camacho, WORK / SCHOOL EXCUSE    Date: 07/08/2019           [facility]       Tarik Cuenca MD, [facility]       MD Ritu Crowe, DAVID Muniz, ENDER  [address] [place] [address]  Office: [phone]  Fax: [phone]     To whom it may concern:    Mr. Maykel Desouza has been caring for a patient hospitalized at the [facility] since 7/6/2019.  Please excuse him from duties.  Patient may return on 7/9/19.     Please contact me if you have any questions.                __________________________  Joann Carranza PA-C

## 2019-07-08 NOTE — PLAN OF CARE
1910H Patient is awake and orientedx4. Care plan explained to patient, daughter, and ; they verbalized understanding. On room air, O2 saturation at 100%. Hooked to heart monitor normal sinus rhythm at 73-75bpm. Patient has a 22G left antecubital flushed with and saline locked. Maintained on 2 gram low sodium diet. No skin issues. Ambulated to the bathroom with assistance. No complaits of pain. Patient denies shortness of breath/cough. Due medications given. Encouraged to turn every 2 hours as tolerated. Maintained on fall risk precaution. Bed in lowest position, bed alarm on, call light/personal items within reach and instructed to call for help when needed. Will continue to monitor.    0000H Patient is now NPO

## 2019-07-08 NOTE — CONSULTS
"  [facility]  Adult Nutrition  Consult Note    SUMMARY     Recommendations    Recommendation:   1. Continue ti encourage intake at meals as tolerated.   2. Encourage diet compliance    Goals:   Pt will consume at least 50-75% intake at meals  Nutrition Goal Status: new  Communication of RD Recs: reviewed with RN    Reason for Assessment  Reason For Assessment: consult(assess dietary needs)  Diagnosis: (cerebral infarction)  Relevant Medical History: HTN, seizures  General Information Comments: Pt on Cardiac 2gm Na diet. Tolerating meals with good intake. Educated on need for low NA diet at home. NFPE not warranted-pt nourished.  Nutrition Discharge Planning: pt to d/c on low Na diet    Nutrition Risk Screen  Nutrition Risk Screen: no indicators present    Nutrition/Diet History  Food Preferences: no Yazdanism or cultural food prefs identified  Spiritual, Cultural Beliefs, Advent Practices, Values that Affect Care: no    Anthropometrics  Temp: 97.5 °F (36.4 °C)  Height Method: Measured  Height: 5' 4" (162.6 cm)  Height (inches): 64 in  Weight Method: Bed Scale  Weight: 100 kg (220 lb 7.4 oz)  Weight (lb): 220.46 lb  Ideal Body Weight (IBW), Female: 120 lb  % Ideal Body Weight, Female (lb): 183.72 lb  BMI (Calculated): 37.9  BMI Grade: 35 - 39.9 - obesity - grade II     Lab/Procedures/Meds  Pertinent Labs Reviewed: reviewed  Pertinent Medications Reviewed: reviewed  Pertinent Medications Comments: aspirin, ferrous sulfate    Estimated/Assessed Needs  Weight Used For Calorie Calculations: 100 kg (220 lb 7.4 oz)  Energy Calorie Requirements (kcal): 1974  Energy Need Method: [place]-St Jeor(x1.2)  Protein Requirements: 65g (1.2g/kg)  Weight Used For Protein Calculations: 54.5 kg (120 lb 2.4 oz)(IBW)  Estimated Fluid Requirement Method: RDA Method  RDA Method (mL): 1974     Nutrition Prescription Ordered  Current Diet Order: Cardiac low Na    Evaluation of Received Nutrient/Fluid Intake  I/O: " 985/1200  Energy Calories Required: meeting needs  Protein Required: meeting needs  Fluid Required: meeting needs  Comments: LBM 7/6  % Intake of Estimated Energy Needs: 75 - 100 %  % Meal Intake: 75 - 100 %    Nutrition Risk  Level of Risk/Frequency of Follow-up: (1xweekly)     Assessment and Plan  No nutrition dx at this time     Monitor and Evaluation  Food and Nutrient Intake: food and beverage intake  Food and Nutrient Adminstration: diet order  Physical Activity and Function: nutrition-related ADLs and IADLs  Anthropometric Measurements: weight  Biochemical Data, Medical Tests and Procedures: electrolyte and renal panel  Nutrition-Focused Physical Findings: overall appearance     Malnutrition Assessment  Subcutaneous Fat Loss (Final Summary): well nourished  Muscle Loss Evaluation (Final Summary): well nourished       Nutrition Follow-Up  RD Follow-up?: Yes

## 2019-07-08 NOTE — PLAN OF CARE
Problem: Adult Inpatient Plan of Care  Goal: Plan of Care Review  Outcome: Ongoing (interventions implemented as appropriate)  PT resting comfortably on room air no distress noted. Will continue to monitor.

## 2019-07-08 NOTE — PLAN OF CARE
Future Appointments   Date Time Provider Department Center   7/18/2019  2:00 PM Ceci Pérez MD [facility] BREANNA Cruz     Discharge rounds on patient. Discussed followup appointments, blue discharge folder, discharge nurse will go over home medications and reasons for medications and final discharge instructions. All patient/caregiver questions answered. Patient verbalized understanding.    Faxed neurology referral twith H&P, discharge summary and facesheet to [facility] [phone], patient and significant other verbalized understanding  No dme or home health needed.         07/08/19 1556   Final Note   Assessment Type Final Discharge Note   Anticipated Discharge Disposition Home   Hospital Follow Up  Appt(s) scheduled? Yes   Discharge plans and expectations educations in teach back method with documentation complete? Yes   Right Care Referral Info   Post Acute Recommendation No Care     Teresa Arce, RN, CCM, CMSRN  RN Transition Navigator  [phone]

## 2019-07-31 ENCOUNTER — OFFICE VISIT (OUTPATIENT)
Dept: OBSTETRICS AND GYNECOLOGY | Facility: CLINIC | Age: 44
End: 2019-07-31
Payer: MEDICAID

## 2019-07-31 VITALS — WEIGHT: 224.63 LBS | BODY MASS INDEX: 38.55 KG/M2

## 2019-07-31 DIAGNOSIS — Z01.419 WELL WOMAN EXAM WITH ROUTINE GYNECOLOGICAL EXAM: Primary | ICD-10-CM

## 2019-07-31 DIAGNOSIS — R10.2 PELVIC PAIN IN FEMALE: ICD-10-CM

## 2019-07-31 DIAGNOSIS — N89.8 VAGINAL DISCHARGE: ICD-10-CM

## 2019-07-31 PROCEDURE — 99386 PR PREVENTIVE VISIT,NEW,40-64: ICD-10-PCS | Mod: S$PBB,,, | Performed by: OBSTETRICS & GYNECOLOGY

## 2019-07-31 PROCEDURE — 88175 CYTOPATH C/V AUTO FLUID REDO: CPT

## 2019-07-31 PROCEDURE — 99999 PR PBB SHADOW E&M-EST. PATIENT-LVL III: ICD-10-PCS | Mod: PBBFAC,,, | Performed by: OBSTETRICS & GYNECOLOGY

## 2019-07-31 PROCEDURE — 87661 TRICHOMONAS VAGINALIS AMPLIF: CPT

## 2019-07-31 PROCEDURE — 99386 PREV VISIT NEW AGE 40-64: CPT | Mod: S$PBB,,, | Performed by: OBSTETRICS & GYNECOLOGY

## 2019-07-31 PROCEDURE — 99999 PR PBB SHADOW E&M-EST. PATIENT-LVL III: CPT | Mod: PBBFAC,,, | Performed by: OBSTETRICS & GYNECOLOGY

## 2019-07-31 PROCEDURE — 87491 CHLMYD TRACH DNA AMP PROBE: CPT

## 2019-07-31 PROCEDURE — 87801 DETECT AGNT MULT DNA AMPLI: CPT

## 2019-07-31 PROCEDURE — 87481 CANDIDA DNA AMP PROBE: CPT | Mod: 59

## 2019-07-31 PROCEDURE — 99213 OFFICE O/P EST LOW 20 MIN: CPT | Mod: PBBFAC,PN | Performed by: OBSTETRICS & GYNECOLOGY

## 2019-07-31 RX ORDER — METRONIDAZOLE 500 MG/1
500 TABLET ORAL EVERY 12 HOURS
Qty: 14 TABLET | Refills: 0 | Status: SHIPPED | OUTPATIENT
Start: 2019-07-31 | End: 2019-08-07

## 2019-07-31 NOTE — PROGRESS NOTES
CC: Annual check-up    SUBJECTIVE:   43 y.o. female   for annual routine Pap and checkup. Patient's last menstrual period was 2019..  She complains of vaginal discharge.        Past Medical History:   Diagnosis Date    Hypertension     Seizures      Past Surgical History:   Procedure Laterality Date    ADENOIDECTOMY      DILATION AND CURETTAGE OF UTERUS       Social History     Socioeconomic History    Marital status: Single     Spouse name: Not on file    Number of children: Not on file    Years of education: Not on file    Highest education level: Not on file   Occupational History    Not on file   Social Needs    Financial resource strain: Not on file    Food insecurity:     Worry: Not on file     Inability: Not on file    Transportation needs:     Medical: Not on file     Non-medical: Not on file   Tobacco Use    Smoking status: Never Smoker    Smokeless tobacco: Never Used   Substance and Sexual Activity    Alcohol use: No    Drug use: Never    Sexual activity: Yes     Partners: Male     Birth control/protection: None   Lifestyle    Physical activity:     Days per week: Not on file     Minutes per session: Not on file    Stress: Not on file   Relationships    Social connections:     Talks on phone: Not on file     Gets together: Not on file     Attends Congregational service: Not on file     Active member of club or organization: Not on file     Attends meetings of clubs or organizations: Not on file     Relationship status: Not on file   Other Topics Concern    Not on file   Social History Narrative    Not on file     History reviewed. No pertinent family history.  OB History    Para Term  AB Living   3 2 1 1 1 2   SAB TAB Ectopic Multiple Live Births       1   2      # Outcome Date GA Lbr Chris/2nd Weight Sex Delivery Anes PTL Lv   3 Ectopic            2      F    KIERA   1 Term     F    KIERA         Current Outpatient Medications   Medication Sig Dispense Refill     amLODIPine (NORVASC) 10 MG tablet Take 1 tablet (10 mg total) by mouth once daily. 30 tablet 3    aspirin (ECOTRIN) 81 MG EC tablet Take 1 tablet (81 mg total) by mouth once daily.  0    ferrous sulfate 325 (65 FE) MG EC tablet Take 1 tablet (325 mg total) by mouth once daily.  0    ibuprofen (ADVIL,MOTRIN) 200 MG tablet Take 2 tablets (400 mg total) by mouth every 6 (six) hours as needed for Pain. 30 tablet 0    losartan-hydrochlorothiazide 50-12.5 mg (HYZAAR) 50-12.5 mg per tablet Take 1 tablet by mouth once daily. 90 tablet 3    pravastatin (PRAVACHOL) 40 MG tablet Take 1 tablet (40 mg total) by mouth once daily. 90 tablet 3     No current facility-administered medications for this visit.      Allergies: Shellfish containing products     ROS:  Constitutional: no weight loss, weight gain, fever, fatigue  Eyes:  No vision changes, glasses/contacts  ENT/Mouth: No ulcers, sinus problems, ears ringing, headache  Cardiovascular: No inability to lie flat, chest pain, exercise intolerance, swelling, heart palpitations  Respiratory: No wheezing, coughing blood, shortness of breath, or cough  Gastrointestinal: No diarrhea, bloody stool, nausea/vomiting, constipation, gas, hemorrhoids  Genitourinary: No blood in urine, painful urination, urgency of urination, frequency of urination, incomplete emptying, incontinence, abnormal bleeding, painful periods, heavy periods, vaginal discharge, vaginal odor, painful intercourse, sexual problems, bleeding after intercourse.  Musculoskeletal: No muscle weakness  Skin/Breast: No painful breasts, nipple discharge, masses, rash, ulcers  Neurological: No passing out, seizures, numbness, headache  Endocrine: No diabetes, hypothyroid, hyperthyroid, hot flashes, hair loss, abnormal hair growth, ance  Psychiatric: No depression, crying  Hematologic: No bruises, bleeding, swollen lymph nodes, anemia.      OBJECTIVE:   The patient appears well, alert, oriented x 3, in no distress.  Wt  101.9 kg (224 lb 9.6 oz)   LMP 07/21/2019   BMI 38.55 kg/m²   NECK: no thyromegaly, trachea midline  SKIN: no acne, striae, hirsutism  BREAST EXAM: breasts appear normal, no suspicious masses, no skin or nipple changes or axillary nodes, symmetric fibrous changes in both upper outer quadrants  ABDOMEN: no hernias, masses, or hepatosplenomegaly  GENITALIA: normal external genitalia, no erythema, no discharge  URETHRA: normal urethra, normal urethral meatus  VAGINA: vaginal discharge copious and white  CERVIX: no lesions or cervical motion tenderness  UTERUS: retroverted, firm and fixed  ADNEXA: normal adnexa      ASSESSMENT:   well woman  1. Well woman exam with routine gynecological exam    2. Pelvic pain in female        PLAN:   mammogram  pap smear  return annually or prn  Orders Placed This Encounter    C. trachomatis/N. gonorrhoeae by AMP DNA [place]; Cervicovaginal    Vaginosis Screen by DNA Probe    US Pelvis Complete Non OB    Mammo Digital Screening Bilat w/ William    Liquid-based pap smear, screening

## 2019-08-01 ENCOUNTER — HOSPITAL ENCOUNTER (OUTPATIENT)
Dept: RADIOLOGY | Facility: HOSPITAL | Age: 44
Discharge: HOME | End: 2019-08-01
Attending: OBSTETRICS & GYNECOLOGY
Payer: MEDICAID

## 2019-08-01 DIAGNOSIS — R10.2 PELVIC PAIN IN FEMALE: ICD-10-CM

## 2019-08-01 DIAGNOSIS — Z01.419 WELL WOMAN EXAM WITH ROUTINE GYNECOLOGICAL EXAM: ICD-10-CM

## 2019-08-01 PROCEDURE — 77067 SCR MAMMO BI INCL CAD: CPT | Mod: TC,PO

## 2019-08-01 PROCEDURE — 76830 TRANSVAGINAL US NON-OB: CPT | Mod: TC,PO

## 2019-08-02 LAB
BACTERIAL VAGINOSIS DNA: POSITIVE
C TRACH DNA SPEC QL NAA+PROBE: NOT DETECTED
CANDIDA GLABRATA DNA: NEGATIVE
CANDIDA KRUSEI DNA: NEGATIVE
CANDIDA RRNA VAG QL PROBE: NEGATIVE
N GONORRHOEA DNA SPEC QL NAA+PROBE: NOT DETECTED
T VAGINALIS RRNA GENITAL QL PROBE: NEGATIVE

## 2019-08-12 ENCOUNTER — TELEPHONE (OUTPATIENT)
Dept: OBSTETRICS AND GYNECOLOGY | Facility: CLINIC | Age: 44
End: 2019-08-12

## 2019-08-12 DIAGNOSIS — N83.201 CYST OF RIGHT OVARY: Primary | ICD-10-CM

## 2019-08-12 NOTE — TELEPHONE ENCOUNTER
----- Message from Hanna Jolley sent at 8/12/2019 11:32 AM CDT -----  Contact: self, [phone] (M)  Patient requests results from ultrasound and mammogram test done on 8/1. Please advise.

## 2019-08-12 NOTE — TELEPHONE ENCOUNTER
Pt notified of rt ovarian cyst  Rpt u/s and ov 2-3rd week of sept with appt after u/s\plz schedule

## 2019-08-12 NOTE — TELEPHONE ENCOUNTER
Called patient, informed patient I was calling to schedule a follow up ultrasound with a visit after to see Dr. Rsos. Informed patient Dr. Ross wants the follow up ultrasound the 2nd or 3rd week of September. Scheduled ultrasound for 9/16/19 at 8:45am at [facility] and an appointment with Dr. Ross after at 9:30am at [facility]. Patient verbalized understanding.

## 2019-08-22 ENCOUNTER — HOSPITAL ENCOUNTER (EMERGENCY)
Facility: HOSPITAL | Age: 44
Discharge: HOME | End: 2019-08-22
Attending: SURGERY
Payer: MEDICAID

## 2019-08-22 ENCOUNTER — HOSPITAL ENCOUNTER (EMERGENCY)
Facility: HOSPITAL | Age: 44
Discharge: HOME | End: 2019-08-22
Attending: EMERGENCY MEDICINE
Payer: MEDICAID

## 2019-08-22 VITALS
SYSTOLIC BLOOD PRESSURE: 167 MMHG | HEIGHT: 63 IN | RESPIRATION RATE: 18 BRPM | TEMPERATURE: 98 F | DIASTOLIC BLOOD PRESSURE: 91 MMHG | OXYGEN SATURATION: 99 % | WEIGHT: 224 LBS | BODY MASS INDEX: 39.69 KG/M2 | HEART RATE: 78 BPM

## 2019-08-22 VITALS
SYSTOLIC BLOOD PRESSURE: 157 MMHG | OXYGEN SATURATION: 100 % | TEMPERATURE: 98 F | RESPIRATION RATE: 18 BRPM | DIASTOLIC BLOOD PRESSURE: 77 MMHG | HEART RATE: 67 BPM

## 2019-08-22 DIAGNOSIS — G44.201 ACUTE INTRACTABLE TENSION-TYPE HEADACHE: Primary | ICD-10-CM

## 2019-08-22 DIAGNOSIS — I10 ESSENTIAL HYPERTENSION: ICD-10-CM

## 2019-08-22 DIAGNOSIS — R11.2 NAUSEA AND VOMITING, INTRACTABILITY OF VOMITING NOT SPECIFIED, UNSPECIFIED VOMITING TYPE: ICD-10-CM

## 2019-08-22 DIAGNOSIS — I16.0 HYPERTENSIVE URGENCY: Primary | ICD-10-CM

## 2019-08-22 PROCEDURE — 99284 EMERGENCY DEPT VISIT MOD MDM: CPT | Mod: 25,27,ER

## 2019-08-22 PROCEDURE — 63600175 PHARM REV CODE 636 W HCPCS: Mod: ER | Performed by: EMERGENCY MEDICINE

## 2019-08-22 PROCEDURE — 96372 THER/PROPH/DIAG INJ SC/IM: CPT | Mod: ER

## 2019-08-22 PROCEDURE — 96361 HYDRATE IV INFUSION ADD-ON: CPT | Mod: ER

## 2019-08-22 PROCEDURE — 96374 THER/PROPH/DIAG INJ IV PUSH: CPT | Mod: ER

## 2019-08-22 PROCEDURE — 99284 EMERGENCY DEPT VISIT MOD MDM: CPT | Mod: 25,ER

## 2019-08-22 PROCEDURE — 25000003 PHARM REV CODE 250: Mod: ER | Performed by: SURGERY

## 2019-08-22 PROCEDURE — 25000003 PHARM REV CODE 250: Mod: ER | Performed by: EMERGENCY MEDICINE

## 2019-08-22 PROCEDURE — 63600175 PHARM REV CODE 636 W HCPCS: Mod: ER | Performed by: SURGERY

## 2019-08-22 RX ORDER — HYDRALAZINE HYDROCHLORIDE 20 MG/ML
20 INJECTION INTRAMUSCULAR; INTRAVENOUS
Status: COMPLETED | OUTPATIENT
Start: 2019-08-22 | End: 2019-08-22

## 2019-08-22 RX ORDER — CLONIDINE HYDROCHLORIDE 0.2 MG/1
0.2 TABLET ORAL
Status: DISCONTINUED | OUTPATIENT
Start: 2019-08-22 | End: 2019-08-22

## 2019-08-22 RX ORDER — CLONIDINE HYDROCHLORIDE 0.1 MG/1
0.1 TABLET ORAL
Status: COMPLETED | OUTPATIENT
Start: 2019-08-22 | End: 2019-08-22

## 2019-08-22 RX ORDER — BUTALBITAL, ACETAMINOPHEN AND CAFFEINE 50; 325; 40 MG/1; MG/1; MG/1
1 TABLET ORAL EVERY 4 HOURS PRN
Qty: 20 TABLET | Refills: 0 | Status: SHIPPED | OUTPATIENT
Start: 2019-08-22 | End: 2019-09-16

## 2019-08-22 RX ORDER — HALOPERIDOL 5 MG/ML
5 INJECTION INTRAMUSCULAR
Status: COMPLETED | OUTPATIENT
Start: 2019-08-22 | End: 2019-08-22

## 2019-08-22 RX ORDER — BUTALBITAL, ACETAMINOPHEN AND CAFFEINE 50; 325; 40 MG/1; MG/1; MG/1
1 TABLET ORAL
Status: COMPLETED | OUTPATIENT
Start: 2019-08-22 | End: 2019-08-22

## 2019-08-22 RX ORDER — DIPHENHYDRAMINE HYDROCHLORIDE 50 MG/ML
25 INJECTION INTRAMUSCULAR; INTRAVENOUS
Status: COMPLETED | OUTPATIENT
Start: 2019-08-22 | End: 2019-08-22

## 2019-08-22 RX ORDER — LORAZEPAM 1 MG/1
1 TABLET ORAL
Status: COMPLETED | OUTPATIENT
Start: 2019-08-22 | End: 2019-08-22

## 2019-08-22 RX ORDER — SODIUM CHLORIDE 9 MG/ML
1000 INJECTION, SOLUTION INTRAVENOUS
Status: COMPLETED | OUTPATIENT
Start: 2019-08-22 | End: 2019-08-22

## 2019-08-22 RX ORDER — ONDANSETRON 4 MG/1
8 TABLET, ORALLY DISINTEGRATING ORAL
Status: COMPLETED | OUTPATIENT
Start: 2019-08-22 | End: 2019-08-22

## 2019-08-22 RX ORDER — ONDANSETRON 4 MG/1
4 TABLET, ORALLY DISINTEGRATING ORAL
Status: DISCONTINUED | OUTPATIENT
Start: 2019-08-22 | End: 2019-08-22

## 2019-08-22 RX ADMIN — CLONIDINE HYDROCHLORIDE 0.1 MG: 0.1 TABLET ORAL at 04:08

## 2019-08-22 RX ADMIN — HALOPERIDOL LACTATE 5 MG: 5 INJECTION, SOLUTION INTRAMUSCULAR at 10:08

## 2019-08-22 RX ADMIN — LORAZEPAM 1 MG: 1 TABLET ORAL at 04:08

## 2019-08-22 RX ADMIN — DIPHENHYDRAMINE HYDROCHLORIDE 25 MG: 50 INJECTION INTRAMUSCULAR; INTRAVENOUS at 10:08

## 2019-08-22 RX ADMIN — HYDRALAZINE HYDROCHLORIDE 20 MG: 20 INJECTION INTRAMUSCULAR; INTRAVENOUS at 04:08

## 2019-08-22 RX ADMIN — ONDANSETRON 8 MG: 4 TABLET, ORALLY DISINTEGRATING ORAL at 10:08

## 2019-08-22 RX ADMIN — SODIUM CHLORIDE 1000 ML: 0.9 INJECTION, SOLUTION INTRAVENOUS at 04:08

## 2019-08-22 RX ADMIN — BUTALBITAL, ACETAMINOPHEN, AND CAFFEINE 1 TABLET: 50; 325; 40 TABLET ORAL at 04:08

## 2019-08-22 NOTE — ED PROVIDER NOTES
Encounter Date: 8/22/2019       History     Chief Complaint   Patient presents with    Hypertension     My pressure has been high for 4 days and i been trying to get it down and i tooka shit load of clonidine, norvasc, and atenolol before i left home it was 171     Patient with poorly controlled blood pressure.  X4 days  She is on Norvasc 10 mg and atenolol and clonidine p.r.n. and still has been having blood pressures in the 200s.  She complains of a mild headache.  She denies chest pain    The history is provided by the patient.   Hypertension    This is a recurrent problem. The current episode started today. The problem has been unchanged. Associated symptoms include headaches. There are no known risk factors.     Review of patient's allergies indicates:   Allergen Reactions    Shellfish containing products Hives     Past Medical History:   Diagnosis Date    Hypertension     Seizures      Past Surgical History:   Procedure Laterality Date    ADENOIDECTOMY      DILATION AND CURETTAGE OF UTERUS       History reviewed. No pertinent family history.  Social History     Tobacco Use    Smoking status: Never Smoker    Smokeless tobacco: Never Used   Substance Use Topics    Alcohol use: No    Drug use: Never     Review of Systems   Constitutional: Negative.    HENT: Negative.    Eyes: Negative.    Respiratory: Negative.    Cardiovascular: Negative.    Gastrointestinal: Negative.    Endocrine: Negative.    Genitourinary: Negative.    Musculoskeletal: Negative.    Skin: Negative.    Allergic/Immunologic: Negative.    Neurological: Positive for headaches.   Psychiatric/Behavioral: Negative.        Physical Exam     Initial Vitals [08/22/19 1609]   BP Pulse Resp Temp SpO2   (!) 218/119 60 15 98.1 °F (36.7 °C) 98 %      MAP       --         Physical Exam    Nursing note and vitals reviewed.  Constitutional: She appears well-developed and well-nourished.   HENT:   Head: Normocephalic.   Eyes: Conjunctivae are normal.    Neck: Normal range of motion.   Cardiovascular: Normal rate.   Pulmonary/Chest: Breath sounds normal.   Abdominal: Soft.   Neurological: She is alert and oriented to person, place, and time. GCS score is 15. GCS eye subscore is 4. GCS verbal subscore is 5. GCS motor subscore is 6.   NIH stroke scale 0   Skin: Skin is warm.         ED Course   Procedures  Labs Reviewed - No data to display       Imaging Results    None          Medical Decision Making:   Initial Assessment:   Hypertensive urgency  ED Management:  Patient's blood pressure lowered in the ED to 153/77 she was asymptomatic she had no headache she had no chest pain 2 deferred back to her primary doctor for blood pressure management                      Clinical Impression:       ICD-10-CM ICD-9-CM   1. Hypertensive urgency I16.0 401.9         Disposition:   Disposition: Discharged  Condition: Stable                        LUIS Kaur III, MD  08/22/19 1735       LUIS Kaur III, MD  08/22/19 0249

## 2019-08-23 NOTE — ED PROVIDER NOTES
"Encounter Date: 8/22/2019       History     Chief Complaint   Patient presents with    Hypertension     44 y/o f to er with c/o "pressure over right eye." Pt reports to being seen in ER earlier today with the same problem. Pt reports to vomiting at home when she went to take something for the pain.      43-year-old female presents to emergency department complaining of severe headache that she rates 9/10 in pressure for right eye.  Patient also reports vomiting at home and request different medication for headache. Patient was prescribed Fioricet earlier which did not help.  Patient also with high blood pressure.  Patient denies focal weakness and slurred speech.        Review of patient's allergies indicates:   Allergen Reactions    Shellfish containing products Hives     Past Medical History:   Diagnosis Date    Hypertension     Seizures      Past Surgical History:   Procedure Laterality Date    ADENOIDECTOMY      DILATION AND CURETTAGE OF UTERUS       History reviewed. No pertinent family history.  Social History     Tobacco Use    Smoking status: Never Smoker    Smokeless tobacco: Never Used   Substance Use Topics    Alcohol use: No    Drug use: Never     Review of Systems   Gastrointestinal: Positive for nausea and vomiting.   Neurological: Positive for headaches.   All other systems reviewed and are negative.      Physical Exam     Initial Vitals [08/22/19 2109]   BP Pulse Resp Temp SpO2   (!) 152/89 64 18 97.7 °F (36.5 °C) 100 %      MAP       --         Physical Exam    Nursing note and vitals reviewed.  Constitutional: She appears well-developed. She is not diaphoretic. She appears distressed.   Patient in moderate distress secondary to headache and laying on stretcher in a dark room.   HENT:   Head: Normocephalic and atraumatic.   Right Ear: External ear normal.   Left Ear: External ear normal.   Nose: Nose normal.   Mouth/Throat: Oropharynx is clear and moist.   Eyes: Conjunctivae and EOM are " normal. Pupils are equal, round, and reactive to light.   Neck: Normal range of motion. Neck supple. No JVD present.   Cardiovascular: Normal rate, regular rhythm, normal heart sounds and intact distal pulses. Exam reveals no gallop and no friction rub.    No murmur heard.  Pulmonary/Chest: Breath sounds normal. No respiratory distress. She has no wheezes. She has no rhonchi. She has no rales.   Abdominal: Soft. Bowel sounds are normal. She exhibits no distension and no mass. There is no tenderness. There is no rebound and no guarding.   Musculoskeletal: Normal range of motion. She exhibits no edema or tenderness.   Lymphadenopathy:     She has no cervical adenopathy.   Neurological: She is alert and oriented to person, place, and time. She has normal strength. GCS score is 15. GCS eye subscore is 4. GCS verbal subscore is 5. GCS motor subscore is 6.   Skin: Skin is warm. Capillary refill takes less than 2 seconds. No rash noted. No erythema.   Psychiatric: She has a normal mood and affect.         ED Course   Procedures  Labs Reviewed - No data to display       Imaging Results    None                               Clinical Impression:       ICD-10-CM ICD-9-CM   1. Acute intractable tension-type headache G44.201 339.10   2. Nausea and vomiting, intractability of vomiting not specified, unspecified vomiting type R11.2 787.01   3. Essential hypertension I10 401.9                                Ankit Roach MD  08/23/19 2718

## 2019-09-16 ENCOUNTER — HOSPITAL ENCOUNTER (OUTPATIENT)
Dept: RADIOLOGY | Facility: HOSPITAL | Age: 44
Discharge: HOME | End: 2019-09-16
Attending: OBSTETRICS & GYNECOLOGY
Payer: MEDICAID

## 2019-09-16 ENCOUNTER — OFFICE VISIT (OUTPATIENT)
Dept: OBSTETRICS AND GYNECOLOGY | Facility: CLINIC | Age: 44
End: 2019-09-16
Payer: MEDICAID

## 2019-09-16 VITALS — BODY MASS INDEX: 39.64 KG/M2 | WEIGHT: 223.81 LBS

## 2019-09-16 DIAGNOSIS — I10 ESSENTIAL HYPERTENSION: ICD-10-CM

## 2019-09-16 DIAGNOSIS — N83.201 CYST OF RIGHT OVARY: ICD-10-CM

## 2019-09-16 DIAGNOSIS — N83.201 RIGHT OVARIAN CYST: Primary | ICD-10-CM

## 2019-09-16 DIAGNOSIS — E07.9 THYROID MASS: ICD-10-CM

## 2019-09-16 PROCEDURE — 99212 OFFICE O/P EST SF 10 MIN: CPT | Mod: PBBFAC,25,PN | Performed by: OBSTETRICS & GYNECOLOGY

## 2019-09-16 PROCEDURE — 99214 PR OFFICE/OUTPT VISIT, EST, LEVL IV, 30-39 MIN: ICD-10-PCS | Mod: S$PBB,,, | Performed by: OBSTETRICS & GYNECOLOGY

## 2019-09-16 PROCEDURE — 99214 OFFICE O/P EST MOD 30 MIN: CPT | Mod: S$PBB,,, | Performed by: OBSTETRICS & GYNECOLOGY

## 2019-09-16 PROCEDURE — 76830 TRANSVAGINAL US NON-OB: CPT | Mod: TC,PO

## 2019-09-16 PROCEDURE — 99999 PR PBB SHADOW E&M-EST. PATIENT-LVL II: ICD-10-PCS | Mod: PBBFAC,,, | Performed by: OBSTETRICS & GYNECOLOGY

## 2019-09-16 PROCEDURE — 99999 PR PBB SHADOW E&M-EST. PATIENT-LVL II: CPT | Mod: PBBFAC,,, | Performed by: OBSTETRICS & GYNECOLOGY

## 2019-09-16 RX ORDER — LOSARTAN POTASSIUM 50 MG/1
50 TABLET ORAL DAILY
Qty: 30 TABLET | Refills: 4 | Status: SHIPPED | OUTPATIENT
Start: 2019-09-16 | End: 2020-07-07

## 2019-09-16 RX ORDER — HYDROCHLOROTHIAZIDE 25 MG/1
25 TABLET ORAL DAILY
Qty: 30 TABLET | Refills: 4 | Status: SHIPPED | OUTPATIENT
Start: 2019-09-16 | End: 2020-09-15

## 2019-09-16 RX ORDER — NIFEDIPINE 60 MG/1
60 TABLET, EXTENDED RELEASE ORAL DAILY
Qty: 30 TABLET | Refills: 4 | Status: SHIPPED | OUTPATIENT
Start: 2019-09-16 | End: 2020-07-07

## 2019-09-16 NOTE — PROGRESS NOTES
CC:  Chief Complaint   Patient presents with    Follow-up     u/s follow up       HPI:  44 yo female  presents for follow-up of cyst seen on ultrasound. Rare 6/10 right sided pain/central pain and very rarely during sex.  .  Not using anything for contraception at the time.  Open to the idea of getting pregnant.    PCP managing BP.  Took meds (she thinks it was norvasc 10, losartan 50, HCTZ 12.5) today despite elevation in BP.    Review of patient's allergies indicates:   Allergen Reactions    Shellfish containing products Hives        Past Medical History:   Diagnosis Date    Hypertension     Seizures      Past Surgical History:   Procedure Laterality Date    ADENOIDECTOMY      DILATION AND CURETTAGE OF UTERUS       History reviewed. No pertinent family history.  Social History     Tobacco Use    Smoking status: Never Smoker    Smokeless tobacco: Never Used   Substance Use Topics    Alcohol use: No    Drug use: Never     ROS:  GENERAL: Feeling well overall. Denies fever or chills.   SKIN: Denies rash or lesions.   HEAD: Denies head injury or headache.   NODES: Denies enlarged lymph nodes.   CHEST: Denies chest pain or shortness of breath.   CARDIOVASCULAR: Denies palpitations or left sided chest pain.    ABDOMEN: Denies diarrhea, nausea, vomiting or rectal bleeding.   URINARY: No dysuria, hematuria, or burning on urination.  REPRODUCTIVE: See HPI.   BREASTS: Denies pain, lumps, or nipple discharge.   HEMATOLOGIC: No easy bruisability or excessive bleeding.   MUSCULOSKELETAL: Denies joint pain or swelling.   NEUROLOGIC: Denies syncope or weakness.   PSYCHIATRIC: Denies depression, anxiety or mood swings.      PE: Wt 101.5 kg (223 lb 12.8 oz)   LMP 2019 (Exact Date)   BMI 39.64 kg/m²      APPEARANCE: Well nourished, well developed, in no acute distress.  SKIN: Normal skin turgor, no lesions.  NECK: +right superior lateral LAD vs mass, non-tender  NODES: No inguinal, cervical,  axillary or femoral lymph node enlargement.  CARDIOVASCULAR: Normal S1, S2. No rubs, murmurs or gallops.  NEUROLOGIC: Normal mood and affect. No depression or anxiety.   ABDOMEN: Soft. No tenderness or masses. No hepatosplenomegaly. No hernias.  RESPIRATORY: Normal respiratory effort with no retractions or use of accessory muscles.      Imagin. The right ovary is enlarged.  The right ovary measures 4.8 cm x 3.2 cm x 6.8 cm. This makes it at increased risk for torsion.  2. The right ovary largely consists of a 52 mm anechoic cyst. On the prior examination the cyst measured 43 mm.  3. There is a fluid-filled tubular structure in the right adnexal region adjacent to the right ovary.  This is consistent with the patient's history and characteristic of hydrosalpinx.    ASSESSMENT/ PLAN    Maru was seen today for follow-up.    Diagnoses and all orders for this visit:    Right ovarian cyst (growing)  -Discuss desire for removal based on symptoms  -Surgical options discussed, but concern for HTN  Robotic rt ovbarian cystectomy and rt salpingectomy    Essential hypertension  -States compliant with meds  -Amlodipine 10 mg switched to procardia 60 mg, may need to be titrated up  -Cont Losartan 50 mg  -HCTZ dose increased to 25 mg  -Recommended pt change PCPs to encourage follow-ups   Recheck in 2 wks  Thyroid Mass vs LAD  -TSH  -Thyroid ultrasound        Griffin Dailey MD

## 2021-03-12 NOTE — ED NOTES
Pt to ED with CC of headache and nausea. Nausea began after pt took dose of fioricet approx 40 minutes PTA in order to relieve right orbital headache. Pt seen in ED today for CC of HTN and headache. Blood pressure remains under control at 152/89.   Island Pedicle Flap With Canthal Suspension Text: The defect edges were debeveled with a #15 scalpel blade.  Given the location of the defect, shape of the defect and the proximity to free margins an island pedicle advancement flap was deemed most appropriate.  Using a sterile surgical marker, an appropriate advancement flap was drawn incorporating the defect, outlining the appropriate donor tissue and placing the expected incisions within the relaxed skin tension lines where possible. The area thus outlined was incised deep to adipose tissue with a #15 scalpel blade.  The skin margins were undermined to an appropriate distance in all directions around the primary defect and laterally outward around the island pedicle utilizing iris scissors.  There was minimal undermining beneath the pedicle flap. A suspension suture was placed in the canthal tendon to prevent tension and prevent ectropion.

## 2021-03-27 ENCOUNTER — HOSPITAL ENCOUNTER (EMERGENCY)
Facility: HOSPITAL | Age: 46
Discharge: HOME | End: 2021-03-27
Attending: EMERGENCY MEDICINE
Payer: MEDICAID

## 2021-03-27 VITALS
OXYGEN SATURATION: 97 % | RESPIRATION RATE: 21 BRPM | TEMPERATURE: 99 F | SYSTOLIC BLOOD PRESSURE: 184 MMHG | WEIGHT: 229.5 LBS | HEIGHT: 63 IN | DIASTOLIC BLOOD PRESSURE: 87 MMHG | HEART RATE: 83 BPM | BODY MASS INDEX: 40.66 KG/M2

## 2021-03-27 DIAGNOSIS — R05.9 COUGH: ICD-10-CM

## 2021-03-27 DIAGNOSIS — R00.2 PALPITATION: ICD-10-CM

## 2021-03-27 DIAGNOSIS — I10 HYPERTENSION, UNSPECIFIED TYPE: Primary | ICD-10-CM

## 2021-03-27 DIAGNOSIS — F43.0 STRESS REACTION: ICD-10-CM

## 2021-03-27 LAB
ANION GAP SERPL CALC-SCNC: 9 MMOL/L (ref 8–16)
ANISOCYTOSIS BLD QL SMEAR: SLIGHT
BASOPHILS # BLD AUTO: 0.05 K/UL (ref 0–0.2)
BASOPHILS NFR BLD: 0.5 % (ref 0–1.9)
CALCIUM SERPL-MCNC: 9.7 MG/DL (ref 8.7–10.5)
CHLORIDE SERPL-SCNC: 107 MMOL/L (ref 95–110)
CO2 SERPL-SCNC: 28 MMOL/L (ref 23–29)
CREAT SERPL-MCNC: 0.85 MG/DL (ref 0.5–1.4)
DACRYOCYTES BLD QL SMEAR: ABNORMAL
DIFFERENTIAL METHOD: ABNORMAL
EOSINOPHIL # BLD AUTO: 0.1 K/UL (ref 0–0.5)
EOSINOPHIL NFR BLD: 1 % (ref 0–8)
ERYTHROCYTE [DISTWIDTH] IN BLOOD BY AUTOMATED COUNT: 19.3 % (ref 11.5–14.5)
EST. GFR  (AFRICAN AMERICAN): >60 ML/MIN/1.73 M^2
EST. GFR  (NON AFRICAN AMERICAN): >60 ML/MIN/1.73 M^2
GLUCOSE SERPL-MCNC: 102 MG/DL (ref 70–110)
HCT VFR BLD AUTO: 31.7 % (ref 37–48.5)
HGB BLD-MCNC: 9.1 G/DL (ref 12–16)
IMM GRANULOCYTES # BLD AUTO: 0.01 K/UL (ref 0–0.04)
IMM GRANULOCYTES NFR BLD AUTO: 0.1 % (ref 0–0.5)
LYMPHOCYTES # BLD AUTO: 2.3 K/UL (ref 1–4.8)
LYMPHOCYTES NFR BLD: 22.1 % (ref 18–48)
MCH RBC QN AUTO: 21 PG (ref 27–31)
MCHC RBC AUTO-ENTMCNC: 28.7 G/DL (ref 32–36)
MCV RBC AUTO: 73 FL (ref 82–98)
MONOCYTES # BLD AUTO: 0.7 K/UL (ref 0.3–1)
MONOCYTES NFR BLD: 6.8 % (ref 4–15)
NEUTROPHILS # BLD AUTO: 7.1 K/UL (ref 1.8–7.7)
NEUTROPHILS NFR BLD: 69.5 % (ref 38–73)
NRBC BLD-RTO: 0 /100 WBC
NT-PROBNP SERPL-MCNC: 143 PG/ML (ref 5–450)
OVALOCYTES BLD QL SMEAR: ABNORMAL
PLATELET # BLD AUTO: 294 K/UL (ref 150–350)
PLATELET BLD QL SMEAR: ABNORMAL
PMV BLD AUTO: 11.7 FL (ref 9.2–12.9)
POIKILOCYTOSIS BLD QL SMEAR: SLIGHT
POLYCHROMASIA BLD QL SMEAR: ABNORMAL
POTASSIUM SERPL-SCNC: 4.3 MMOL/L (ref 3.5–5.1)
RBC # BLD AUTO: 4.34 M/UL (ref 4–5.4)
SODIUM SERPL-SCNC: 144 MMOL/L (ref 136–145)
TARGETS BLD QL SMEAR: ABNORMAL
TROPONIN I SERPL-MCNC: <0.012 NG/ML (ref 0.01–0.03)
UUN UR-MCNC: 13 MG/DL (ref 7–17)
WBC # BLD AUTO: 10.27 K/UL (ref 3.9–12.7)

## 2021-03-27 PROCEDURE — 82962 GLUCOSE BLOOD TEST: CPT | Mod: ER

## 2021-03-27 PROCEDURE — 83880 ASSAY OF NATRIURETIC PEPTIDE: CPT | Mod: ER | Performed by: EMERGENCY MEDICINE

## 2021-03-27 PROCEDURE — 93010 ELECTROCARDIOGRAM REPORT: CPT | Mod: ,,, | Performed by: INTERNAL MEDICINE

## 2021-03-27 PROCEDURE — 84484 ASSAY OF TROPONIN QUANT: CPT | Mod: ER | Performed by: EMERGENCY MEDICINE

## 2021-03-27 PROCEDURE — 99285 EMERGENCY DEPT VISIT HI MDM: CPT | Mod: 25,ER

## 2021-03-27 PROCEDURE — 80048 BASIC METABOLIC PNL TOTAL CA: CPT | Mod: ER | Performed by: EMERGENCY MEDICINE

## 2021-03-27 PROCEDURE — 85025 COMPLETE CBC W/AUTO DIFF WBC: CPT | Mod: ER | Performed by: EMERGENCY MEDICINE

## 2021-03-27 PROCEDURE — 93010 EKG 12-LEAD: ICD-10-PCS | Mod: ,,, | Performed by: INTERNAL MEDICINE

## 2021-03-27 PROCEDURE — 25000003 PHARM REV CODE 250: Mod: ER | Performed by: EMERGENCY MEDICINE

## 2021-03-27 PROCEDURE — 93005 ELECTROCARDIOGRAM TRACING: CPT | Mod: ER

## 2021-03-27 RX ORDER — CLONIDINE HYDROCHLORIDE 0.1 MG/1
0.1 TABLET ORAL
Status: COMPLETED | OUTPATIENT
Start: 2021-03-27 | End: 2021-03-27

## 2021-03-27 RX ORDER — ASPIRIN 325 MG
325 TABLET, DELAYED RELEASE (ENTERIC COATED) ORAL
Status: COMPLETED | OUTPATIENT
Start: 2021-03-27 | End: 2021-03-27

## 2021-03-27 RX ADMIN — CLONIDINE HYDROCHLORIDE 0.1 MG: 0.1 TABLET ORAL at 10:03

## 2021-03-27 RX ADMIN — ASPIRIN 325 MG: 325 TABLET, COATED ORAL at 10:03

## 2021-03-28 LAB — POCT GLUCOSE: 99 MG/DL (ref 70–110)

## 2021-10-05 ENCOUNTER — HOSPITAL ENCOUNTER (EMERGENCY)
Facility: HOSPITAL | Age: 46
Discharge: HOME | End: 2021-10-05
Attending: EMERGENCY MEDICINE
Payer: MEDICAID

## 2021-10-05 VITALS
DIASTOLIC BLOOD PRESSURE: 116 MMHG | HEART RATE: 89 BPM | BODY MASS INDEX: 38.44 KG/M2 | TEMPERATURE: 98 F | RESPIRATION RATE: 12 BRPM | WEIGHT: 217 LBS | OXYGEN SATURATION: 99 % | SYSTOLIC BLOOD PRESSURE: 220 MMHG

## 2021-10-05 DIAGNOSIS — N92.6 IRREGULAR MENSES: Primary | ICD-10-CM

## 2021-10-05 LAB — B-HCG UR QL: NEGATIVE

## 2021-10-05 PROCEDURE — 99283 EMERGENCY DEPT VISIT LOW MDM: CPT | Mod: ER

## 2021-10-05 PROCEDURE — 25000003 PHARM REV CODE 250: Mod: ER | Performed by: PHYSICIAN ASSISTANT

## 2021-10-05 PROCEDURE — 81025 URINE PREGNANCY TEST: CPT | Mod: ER | Performed by: EMERGENCY MEDICINE

## 2021-10-05 RX ORDER — DICYCLOMINE HYDROCHLORIDE 10 MG/1
20 CAPSULE ORAL
Status: COMPLETED | OUTPATIENT
Start: 2021-10-05 | End: 2021-10-05

## 2021-10-05 RX ORDER — DICYCLOMINE HYDROCHLORIDE 20 MG/1
20 TABLET ORAL 2 TIMES DAILY
Qty: 20 TABLET | Refills: 0 | Status: SHIPPED | OUTPATIENT
Start: 2021-10-05 | End: 2021-11-04

## 2021-10-05 RX ADMIN — DICYCLOMINE HYDROCHLORIDE 20 MG: 10 CAPSULE ORAL at 08:10

## 2022-01-01 ENCOUNTER — HOSPITAL ENCOUNTER (EMERGENCY)
Facility: HOSPITAL | Age: 47
Discharge: HOME | End: 2022-01-01
Attending: EMERGENCY MEDICINE
Payer: MEDICAID

## 2022-01-01 VITALS
HEIGHT: 63 IN | SYSTOLIC BLOOD PRESSURE: 192 MMHG | DIASTOLIC BLOOD PRESSURE: 104 MMHG | BODY MASS INDEX: 38.8 KG/M2 | HEART RATE: 80 BPM | TEMPERATURE: 98 F | OXYGEN SATURATION: 98 % | WEIGHT: 219 LBS | RESPIRATION RATE: 20 BRPM

## 2022-01-01 DIAGNOSIS — H00.024 HORDEOLUM INTERNUM OF LEFT UPPER EYELID: Primary | ICD-10-CM

## 2022-01-01 PROCEDURE — 99283 EMERGENCY DEPT VISIT LOW MDM: CPT | Mod: ER

## 2022-01-01 RX ORDER — BACITRACIN ZINC AND POLYMYXIN B SULFATE 500; 10000 [USP'U]/G; [USP'U]/G
OINTMENT OPHTHALMIC 4 TIMES DAILY
Qty: 15 G | Refills: 0 | Status: SHIPPED | OUTPATIENT
Start: 2022-01-01 | End: 2023-08-28

## 2022-01-02 NOTE — ED PROVIDER NOTES
Encounter Date: 1/1/2022       History     Chief Complaint   Patient presents with    Eye Problem     Reports swelling and redness to left upper eyelid for 2 weeks. States swelling is getting worse. Reports pain at times.     Patient is a 46-year-old female who presents to ED with complaints of swelling and redness to left upper eyelid.  Patient reports symptoms present x2 weeks.  Reports that she occasionally does warm compresses.  Denies any visual disturbances, discharge, eye redness.  Patient wears glasses, no contact lenses.        Review of patient's allergies indicates:   Allergen Reactions    Shellfish containing products Hives     Past Medical History:   Diagnosis Date    Hypertension     Seizures      Past Surgical History:   Procedure Laterality Date    ADENOIDECTOMY      DILATION AND CURETTAGE OF UTERUS       History reviewed. No pertinent family history.  Social History     Tobacco Use    Smoking status: Never Smoker    Smokeless tobacco: Never Used   Substance Use Topics    Alcohol use: No    Drug use: Never     Review of Systems   Constitutional: Negative for chills, diaphoresis, fatigue and fever.   HENT: Negative for congestion, rhinorrhea and sore throat.    Eyes: Negative for photophobia, discharge and visual disturbance.        + left upper eyelid swelling and redness   Respiratory: Negative for cough and shortness of breath.    Cardiovascular: Negative for chest pain.   Gastrointestinal: Negative for diarrhea, nausea and vomiting.   Musculoskeletal: Negative for arthralgias, back pain and myalgias.   Skin: Negative for rash.   Neurological: Negative for weakness and headaches.       Physical Exam     Initial Vitals [01/01/22 1745]   BP Pulse Resp Temp SpO2   (!) 192/104 80 20 98.2 °F (36.8 °C) 98 %      MAP       --         Physical Exam    Nursing note and vitals reviewed.  Constitutional: She appears well-developed and well-nourished. She is not diaphoretic. No distress.   HENT:    Head: Normocephalic and atraumatic.   Eyes: Conjunctivae and EOM are normal. Pupils are equal, round, and reactive to light. Right eye exhibits no discharge. Left eye exhibits no discharge.   Internal hordeolum of left upper eyelid   Neck: Neck supple.   Normal range of motion.  Cardiovascular: Normal rate, regular rhythm, normal heart sounds and intact distal pulses.   Pulmonary/Chest: Breath sounds normal. No respiratory distress.   Abdominal: Abdomen is soft. Bowel sounds are normal. There is no abdominal tenderness.   Musculoskeletal:         General: No tenderness or edema. Normal range of motion.      Cervical back: Normal range of motion and neck supple.     Neurological: She is alert and oriented to person, place, and time. She has normal strength. GCS score is 15. GCS eye subscore is 4. GCS verbal subscore is 5. GCS motor subscore is 6.   Skin: Skin is warm. Capillary refill takes less than 2 seconds. No rash noted.         ED Course   Procedures  Labs Reviewed - No data to display       Imaging Results    None          Medications - No data to display  Medical Decision Making:   ED Management:  Patient has physical exam findings concerning for hordeola of left upper eyelid.  Patient advised to perform warm compresses multiple times daily.  Rx provided for antibiotic ointment to prevent secondary infection.  Patient was advised that if she has no improvement, she will need incision and drainage by ophthalmologist.  ED precautions were discussed at length.  Patient voiced understanding and agreement plan of care.  All questions answered.                      Clinical Impression:   Final diagnoses:  [H00.024] Hordeolum internum of left upper eyelid (Primary)          ED Disposition Condition    Discharge Stable        ED Prescriptions     Medication Sig Dispense Start Date End Date Auth. Provider    bacitracin-polymyxin b (POLYSPORIN) ophthalmic ointment Place into the left eye 4 (four) times daily. 15 g  1/1/2022  Myrna Perkins PA-C        Follow-up Information     Follow up With Specialties Details Why Contact Info    Connor Irvin MD Ophthalmology   [address] [place] [address] [id number] [phone]             Myrna Perkins PA-C  01/01/22 6554

## 2022-01-02 NOTE — DISCHARGE INSTRUCTIONS
Apply warm compresses to your left eye 3-4 x/day for 10-15 min at a time.   Use the antibiotic ointment as prescribed.   Follow-up with your eye doctor for definitive management.    Pt awake, alert, NAD. Pt BIBEMS for weakness. Pt sts that she was unable to get out of bed today and her  was unable to help her. Pt with left chest wall port. Last chemo treatment 1 month ago. Breathing even and unlabored. Pt walks with a cane. Cane on stretcher with pt.

## 2022-01-04 ENCOUNTER — LAB VISIT (OUTPATIENT)
Dept: PRIMARY CARE CLINIC | Facility: OTHER | Age: 47
End: 2022-01-04
Attending: INTERNAL MEDICINE
Payer: MEDICAID

## 2022-01-04 DIAGNOSIS — U07.1 COVID-19: Primary | ICD-10-CM

## 2022-01-04 LAB
CTP QC/QA: YES
SARS-COV-2 AG RESP QL IA.RAPID: POSITIVE

## 2022-01-04 PROCEDURE — 87811 SARS CORONAVIRUS 2 ANTIGEN POCT, MANUAL READ: ICD-10-PCS | Mod: ,,, | Performed by: INTERNAL MEDICINE

## 2022-01-04 PROCEDURE — 87811 SARS-COV-2 COVID19 W/OPTIC: CPT | Mod: ,,, | Performed by: INTERNAL MEDICINE

## 2022-01-04 NOTE — PROGRESS NOTES
Instructions for Patients with Confirmed or Suspected COVID-19    If you are awaiting your test result, you will either be called or it will be released to the patient portal.  If you have any questions about your test, please visit [email or web address] or call our COVID-19 information line at [phone].      Please isolate yourself at home.  You may leave home and/or return to work once the following conditions are met:    If you have symptoms and tested positive:   More than 5 days since symptoms first appeared AND   More than 24 hours fever free without medications AND       symptoms have improved   · For five days after ending isolation, masks are required.    If you had no symptoms but tested positive:   More than 5 days since the date of the first positive test. If you develop symptoms, then use the guidelines above  · For five days after ending isolation, masks are required.      Testing is not recommended if you are symptom free after completing isolation.

## 2022-06-03 ENCOUNTER — OFFICE VISIT (OUTPATIENT)
Dept: OBSTETRICS AND GYNECOLOGY | Facility: CLINIC | Age: 47
End: 2022-06-03
Payer: MEDICAID

## 2022-06-03 VITALS
BODY MASS INDEX: 41.01 KG/M2 | DIASTOLIC BLOOD PRESSURE: 114 MMHG | SYSTOLIC BLOOD PRESSURE: 199 MMHG | WEIGHT: 231.5 LBS

## 2022-06-03 DIAGNOSIS — N89.8 VAGINAL DISCHARGE: ICD-10-CM

## 2022-06-03 DIAGNOSIS — Z11.3 SCREENING FOR STD (SEXUALLY TRANSMITTED DISEASE): Primary | ICD-10-CM

## 2022-06-03 DIAGNOSIS — Z01.419 WELL WOMAN EXAM WITH ROUTINE GYNECOLOGICAL EXAM: ICD-10-CM

## 2022-06-03 PROCEDURE — 3077F SYST BP >= 140 MM HG: CPT | Mod: CPTII,,, | Performed by: OBSTETRICS & GYNECOLOGY

## 2022-06-03 PROCEDURE — 88175 CYTOPATH C/V AUTO FLUID REDO: CPT | Performed by: OBSTETRICS & GYNECOLOGY

## 2022-06-03 PROCEDURE — 99396 PR PREVENTIVE VISIT,EST,40-64: ICD-10-PCS | Mod: S$PBB,,, | Performed by: OBSTETRICS & GYNECOLOGY

## 2022-06-03 PROCEDURE — 87491 CHLMYD TRACH DNA AMP PROBE: CPT | Mod: 59 | Performed by: OBSTETRICS & GYNECOLOGY

## 2022-06-03 PROCEDURE — 3008F BODY MASS INDEX DOCD: CPT | Mod: CPTII,,, | Performed by: OBSTETRICS & GYNECOLOGY

## 2022-06-03 PROCEDURE — 1160F RVW MEDS BY RX/DR IN RCRD: CPT | Mod: CPTII,,, | Performed by: OBSTETRICS & GYNECOLOGY

## 2022-06-03 PROCEDURE — 87481 CANDIDA DNA AMP PROBE: CPT | Mod: 59 | Performed by: OBSTETRICS & GYNECOLOGY

## 2022-06-03 PROCEDURE — 87591 N.GONORRHOEAE DNA AMP PROB: CPT | Performed by: OBSTETRICS & GYNECOLOGY

## 2022-06-03 PROCEDURE — 1159F PR MEDICATION LIST DOCUMENTED IN MEDICAL RECORD: ICD-10-PCS | Mod: CPTII,,, | Performed by: OBSTETRICS & GYNECOLOGY

## 2022-06-03 PROCEDURE — 3080F DIAST BP >= 90 MM HG: CPT | Mod: CPTII,,, | Performed by: OBSTETRICS & GYNECOLOGY

## 2022-06-03 PROCEDURE — 99999 PR PBB SHADOW E&M-EST. PATIENT-LVL III: CPT | Mod: PBBFAC,,, | Performed by: OBSTETRICS & GYNECOLOGY

## 2022-06-03 PROCEDURE — 1159F MED LIST DOCD IN RCRD: CPT | Mod: CPTII,,, | Performed by: OBSTETRICS & GYNECOLOGY

## 2022-06-03 PROCEDURE — 99999 PR PBB SHADOW E&M-EST. PATIENT-LVL III: ICD-10-PCS | Mod: PBBFAC,,, | Performed by: OBSTETRICS & GYNECOLOGY

## 2022-06-03 PROCEDURE — 3008F PR BODY MASS INDEX (BMI) DOCUMENTED: ICD-10-PCS | Mod: CPTII,,, | Performed by: OBSTETRICS & GYNECOLOGY

## 2022-06-03 PROCEDURE — 99396 PREV VISIT EST AGE 40-64: CPT | Mod: S$PBB,,, | Performed by: OBSTETRICS & GYNECOLOGY

## 2022-06-03 PROCEDURE — 3077F PR MOST RECENT SYSTOLIC BLOOD PRESSURE >= 140 MM HG: ICD-10-PCS | Mod: CPTII,,, | Performed by: OBSTETRICS & GYNECOLOGY

## 2022-06-03 PROCEDURE — 3080F PR MOST RECENT DIASTOLIC BLOOD PRESSURE >= 90 MM HG: ICD-10-PCS | Mod: CPTII,,, | Performed by: OBSTETRICS & GYNECOLOGY

## 2022-06-03 PROCEDURE — 1160F PR REVIEW ALL MEDS BY PRESCRIBER/CLIN PHARMACIST DOCUMENTED: ICD-10-PCS | Mod: CPTII,,, | Performed by: OBSTETRICS & GYNECOLOGY

## 2022-06-03 PROCEDURE — 99213 OFFICE O/P EST LOW 20 MIN: CPT | Mod: PBBFAC,PN | Performed by: OBSTETRICS & GYNECOLOGY

## 2022-06-03 RX ORDER — AMLODIPINE BESYLATE 10 MG/1
10 TABLET ORAL DAILY
COMMUNITY
Start: 2022-03-22

## 2022-06-03 RX ORDER — CHLORTHALIDONE 25 MG/1
12.5 TABLET ORAL DAILY
COMMUNITY
Start: 2022-02-12 | End: 2023-08-28

## 2022-06-03 NOTE — PROGRESS NOTES
CC: Annual check-up    SUBJECTIVE:   46 y.o. female   for annual routine Pap and checkup. Patient's last menstrual period was 2022..  She has no unusual complaints and did not take BP meds this am.    Wants STD testing, has a dc, cycles reg last 5 days    Past Medical History:   Diagnosis Date    Hypertension     Seizures      Past Surgical History:   Procedure Laterality Date    ADENOIDECTOMY      DILATION AND CURETTAGE OF UTERUS       Social History     Socioeconomic History    Marital status: Single   Tobacco Use    Smoking status: Never Smoker    Smokeless tobacco: Never Used   Substance and Sexual Activity    Alcohol use: No    Drug use: Never    Sexual activity: Yes     Partners: Male     Birth control/protection: None     History reviewed. No pertinent family history.  OB History    Para Term  AB Living   4 2 1 1 2 2   SAB IAB Ectopic Multiple Live Births   1   1   2      # Outcome Date GA Lbr Chris/2nd Weight Sex Delivery Anes PTL Lv   4  00 35w0d   F Vag-Spont   KIERA   3 Ectopic            2 Term     F Vag-Spont   KIERA   1 SAB                  Current Outpatient Medications   Medication Sig Dispense Refill    amLODIPine (NORVASC) 10 MG tablet Take 10 mg by mouth once daily.      chlorthalidone (HYGROTEN) 25 MG Tab Take 12.5 mg by mouth once daily.      ferrous sulfate 325 (65 FE) MG EC tablet Take 1 tablet (325 mg total) by mouth once daily.  0    bacitracin-polymyxin b (POLYSPORIN) ophthalmic ointment Place into the left eye 4 (four) times daily. (Patient not taking: Reported on 6/3/2022) 15 g 0    hydroCHLOROthiazide (HYDRODIURIL) 25 MG tablet Take 1 tablet (25 mg total) by mouth once daily. 30 tablet 4    losartan (COZAAR) 50 MG tablet TAKE 1 TABLET(50 MG) BY MOUTH EVERY DAY (Patient not taking: Reported on 6/3/2022) 30 tablet 4    NIFEdipine (PROCARDIA-XL) 60 MG (OSM) 24 hr tablet TAKE 1 TABLET(60 MG) BY MOUTH EVERY DAY (Patient not  taking: Reported on 6/3/2022) 30 tablet 4    pravastatin (PRAVACHOL) 40 MG tablet Take 1 tablet (40 mg total) by mouth once daily. 90 tablet 3     No current facility-administered medications for this visit.     Allergies: Shellfish containing products     ROS:  Constitutional: no weight loss, weight gain, fever, fatigue  Eyes:  No vision changes, glasses/contacts  ENT/Mouth: No ulcers, sinus problems, ears ringing, headache  Cardiovascular: No inability to lie flat, chest pain, exercise intolerance, swelling, heart palpitations  Respiratory: No wheezing, coughing blood, shortness of breath, or cough  Gastrointestinal: No diarrhea, bloody stool, nausea/vomiting, constipation, gas, hemorrhoids  Genitourinary: No blood in urine, painful urination, urgency of urination, frequency of urination, incomplete emptying, incontinence, abnormal bleeding, painful periods, heavy periods, vaginal discharge, vaginal odor, painful intercourse, sexual problems, bleeding after intercourse.  Musculoskeletal: No muscle weakness  Skin/Breast: No painful breasts, nipple discharge, masses, rash, ulcers  Neurological: No passing out, seizures, numbness, headache  Endocrine: No diabetes, hypothyroid, hyperthyroid, hot flashes, hair loss, abnormal hair growth, ance  Psychiatric: No depression, crying  Hematologic: No bruises, bleeding, swollen lymph nodes, anemia.      OBJECTIVE:   The patient appears well, alert, oriented x 3, in no distress.  BP (!) 199/114   Wt 105 kg (231 lb 8 oz)   LMP 05/26/2022   BMI 41.01 kg/m²   NECK: no thyromegaly, trachea midline  SKIN: no acne, striae, hirsutism  BREAST EXAM: breasts appear normal, no suspicious masses, no skin or nipple changes or axillary nodes, symmetric fibrous changes in both upper outer quadrants  ABDOMEN: obese and no hernias, masses, or hepatosplenomegaly  GENITALIA: normal external genitalia, no erythema, no discharge  URETHRA: normal urethra, normal urethral meatus  VAGINA: vaginal  discharge copious, white and frothy  CERVIX: no lesions or cervical motion tenderness  UTERUS: normal  ADNEXA: normal adnexa and no mass, fullness, tenderness      ASSESSMENT:   well woman  1. Screening for STD (sexually transmitted disease)    2. Vaginal discharge    3. Well woman exam with routine gynecological exam        PLAN:   mammogram  pap smear  return annually or prn  Orders Placed This Encounter    C. trachomatis/N. gonorrhoeae by AMP DNA    Vaginosis Screen by DNA Probe    Mammo Digital Screening Bilat w/ William    Liquid-Based Pap Smear, Screening     Instructed to take BP meds when gets home

## 2022-06-07 ENCOUNTER — TELEPHONE (OUTPATIENT)
Dept: OBSTETRICS AND GYNECOLOGY | Facility: HOSPITAL | Age: 47
End: 2022-06-07
Payer: MEDICAID

## 2022-06-07 RX ORDER — METRONIDAZOLE 500 MG/1
500 TABLET ORAL EVERY 12 HOURS
Qty: 14 TABLET | Refills: 0 | Status: SHIPPED | OUTPATIENT
Start: 2022-06-07 | End: 2022-06-14

## 2022-12-01 ENCOUNTER — HOSPITAL ENCOUNTER (EMERGENCY)
Facility: HOSPITAL | Age: 47
Discharge: HOME | End: 2022-12-01
Attending: EMERGENCY MEDICINE
Payer: MEDICAID

## 2022-12-01 VITALS
WEIGHT: 231.5 LBS | HEIGHT: 63 IN | TEMPERATURE: 98 F | OXYGEN SATURATION: 98 % | BODY MASS INDEX: 41.02 KG/M2 | HEART RATE: 79 BPM | DIASTOLIC BLOOD PRESSURE: 90 MMHG | SYSTOLIC BLOOD PRESSURE: 163 MMHG | RESPIRATION RATE: 19 BRPM

## 2022-12-01 DIAGNOSIS — R51.9 ACUTE NONINTRACTABLE HEADACHE, UNSPECIFIED HEADACHE TYPE: ICD-10-CM

## 2022-12-01 DIAGNOSIS — R11.2 NAUSEA AND VOMITING, UNSPECIFIED VOMITING TYPE: Primary | ICD-10-CM

## 2022-12-01 LAB
ALBUMIN SERPL BCP-MCNC: 4 G/DL (ref 3.5–5.2)
ALP SERPL-CCNC: 79 U/L (ref 38–126)
ALT SERPL W/O P-5'-P-CCNC: 13 U/L (ref 10–44)
ANION GAP SERPL CALC-SCNC: 6 MMOL/L (ref 8–16)
AST SERPL-CCNC: 14 U/L (ref 15–46)
BASOPHILS # BLD AUTO: 0.04 K/UL (ref 0–0.2)
BASOPHILS NFR BLD: 0.6 % (ref 0–1.9)
BILIRUB SERPL-MCNC: 0.3 MG/DL (ref 0.1–1)
BILIRUB UR QL STRIP: NEGATIVE
CALCIUM SERPL-MCNC: 8.3 MG/DL (ref 8.7–10.5)
CHLORIDE SERPL-SCNC: 108 MMOL/L (ref 95–110)
CLARITY UR REFRACT.AUTO: ABNORMAL
CO2 SERPL-SCNC: 26 MMOL/L (ref 23–29)
COLOR UR AUTO: YELLOW
CREAT SERPL-MCNC: 0.73 MG/DL (ref 0.5–1.4)
DIFFERENTIAL METHOD: ABNORMAL
EOSINOPHIL # BLD AUTO: 0.1 K/UL (ref 0–0.5)
EOSINOPHIL NFR BLD: 1.4 % (ref 0–8)
ERYTHROCYTE [DISTWIDTH] IN BLOOD BY AUTOMATED COUNT: 19.9 % (ref 11.5–14.5)
EST. GFR  (NO RACE VARIABLE): >60 ML/MIN/1.73 M^2
GLUCOSE SERPL-MCNC: 118 MG/DL (ref 70–110)
GLUCOSE UR QL STRIP: NEGATIVE
HCT VFR BLD AUTO: 33.2 % (ref 37–48.5)
HGB BLD-MCNC: 9.2 G/DL (ref 12–16)
HGB UR QL STRIP: ABNORMAL
IMM GRANULOCYTES # BLD AUTO: 0.01 K/UL (ref 0–0.04)
IMM GRANULOCYTES NFR BLD AUTO: 0.2 % (ref 0–0.5)
KETONES UR QL STRIP: NEGATIVE
LEUKOCYTE ESTERASE UR QL STRIP: NEGATIVE
LIPASE SERPL-CCNC: 191 U/L (ref 23–300)
LYMPHOCYTES # BLD AUTO: 2.3 K/UL (ref 1–4.8)
LYMPHOCYTES NFR BLD: 36.5 % (ref 18–48)
MCH RBC QN AUTO: 19 PG (ref 27–31)
MCHC RBC AUTO-ENTMCNC: 27.7 G/DL (ref 32–36)
MCV RBC AUTO: 69 FL (ref 82–98)
MONOCYTES # BLD AUTO: 0.4 K/UL (ref 0.3–1)
MONOCYTES NFR BLD: 7 % (ref 4–15)
NEUTROPHILS # BLD AUTO: 3.4 K/UL (ref 1.8–7.7)
NEUTROPHILS NFR BLD: 54.3 % (ref 38–73)
NITRITE UR QL STRIP: NEGATIVE
NRBC BLD-RTO: 0 /100 WBC
PH UR STRIP: 6 [PH] (ref 5–8)
PLATELET # BLD AUTO: 284 K/UL (ref 150–450)
PMV BLD AUTO: ABNORMAL FL (ref 9.2–12.9)
POTASSIUM SERPL-SCNC: 3.5 MMOL/L (ref 3.5–5.1)
PROT SERPL-MCNC: 7.1 G/DL (ref 6–8.4)
PROT UR QL STRIP: NEGATIVE
RBC # BLD AUTO: 4.84 M/UL (ref 4–5.4)
SARS-COV-2 RDRP RESP QL NAA+PROBE: NEGATIVE
SODIUM SERPL-SCNC: 140 MMOL/L (ref 136–145)
SP GR UR STRIP: >=1.03 (ref 1–1.03)
URN SPEC COLLECT METH UR: ABNORMAL
UROBILINOGEN UR STRIP-ACNC: NEGATIVE EU/DL
UUN UR-MCNC: 13 MG/DL (ref 7–17)
WBC # BLD AUTO: 6.31 K/UL (ref 3.9–12.7)

## 2022-12-01 PROCEDURE — 25500020 PHARM REV CODE 255: Mod: ER | Performed by: EMERGENCY MEDICINE

## 2022-12-01 PROCEDURE — 81003 URINALYSIS AUTO W/O SCOPE: CPT | Mod: ER | Performed by: EMERGENCY MEDICINE

## 2022-12-01 PROCEDURE — U0002 COVID-19 LAB TEST NON-CDC: HCPCS | Mod: ER | Performed by: EMERGENCY MEDICINE

## 2022-12-01 PROCEDURE — 96375 TX/PRO/DX INJ NEW DRUG ADDON: CPT | Mod: ER

## 2022-12-01 PROCEDURE — 83690 ASSAY OF LIPASE: CPT | Mod: ER | Performed by: EMERGENCY MEDICINE

## 2022-12-01 PROCEDURE — 25000003 PHARM REV CODE 250: Mod: ER | Performed by: EMERGENCY MEDICINE

## 2022-12-01 PROCEDURE — 99285 EMERGENCY DEPT VISIT HI MDM: CPT | Mod: 25,ER

## 2022-12-01 PROCEDURE — 85025 COMPLETE CBC W/AUTO DIFF WBC: CPT | Mod: ER | Performed by: EMERGENCY MEDICINE

## 2022-12-01 PROCEDURE — 63600175 PHARM REV CODE 636 W HCPCS: Mod: ER | Performed by: EMERGENCY MEDICINE

## 2022-12-01 PROCEDURE — 80053 COMPREHEN METABOLIC PANEL: CPT | Mod: ER | Performed by: EMERGENCY MEDICINE

## 2022-12-01 PROCEDURE — 96361 HYDRATE IV INFUSION ADD-ON: CPT | Mod: ER

## 2022-12-01 PROCEDURE — 96374 THER/PROPH/DIAG INJ IV PUSH: CPT | Mod: ER

## 2022-12-01 RX ORDER — BUTALBITAL, ACETAMINOPHEN AND CAFFEINE 300; 40; 50 MG/1; MG/1; MG/1
CAPSULE ORAL
COMMUNITY
Start: 2022-11-29 | End: 2023-08-28

## 2022-12-01 RX ORDER — PROCHLORPERAZINE EDISYLATE 5 MG/ML
10 INJECTION INTRAMUSCULAR; INTRAVENOUS
Status: COMPLETED | OUTPATIENT
Start: 2022-12-01 | End: 2022-12-01

## 2022-12-01 RX ORDER — KETOROLAC TROMETHAMINE 30 MG/ML
15 INJECTION, SOLUTION INTRAMUSCULAR; INTRAVENOUS
Status: COMPLETED | OUTPATIENT
Start: 2022-12-01 | End: 2022-12-01

## 2022-12-01 RX ORDER — ONDANSETRON 2 MG/ML
4 INJECTION INTRAMUSCULAR; INTRAVENOUS
Status: COMPLETED | OUTPATIENT
Start: 2022-12-01 | End: 2022-12-01

## 2022-12-01 RX ORDER — DIPHENHYDRAMINE HYDROCHLORIDE 50 MG/ML
12.5 INJECTION INTRAMUSCULAR; INTRAVENOUS
Status: COMPLETED | OUTPATIENT
Start: 2022-12-01 | End: 2022-12-01

## 2022-12-01 RX ORDER — METOCLOPRAMIDE 10 MG/1
10 TABLET ORAL EVERY 8 HOURS PRN
Qty: 20 TABLET | Refills: 0 | Status: SHIPPED | OUTPATIENT
Start: 2022-12-01 | End: 2023-08-28

## 2022-12-01 RX ORDER — ACETAMINOPHEN 500 MG
1000 TABLET ORAL
Status: DISCONTINUED | OUTPATIENT
Start: 2022-12-01 | End: 2022-12-01 | Stop reason: HOSPADM

## 2022-12-01 RX ADMIN — IOHEXOL 100 ML: 350 INJECTION, SOLUTION INTRAVENOUS at 07:12

## 2022-12-01 RX ADMIN — SODIUM CHLORIDE 1000 ML: 0.9 INJECTION, SOLUTION INTRAVENOUS at 06:12

## 2022-12-01 RX ADMIN — PROCHLORPERAZINE EDISYLATE 10 MG: 5 INJECTION INTRAMUSCULAR; INTRAVENOUS at 08:12

## 2022-12-01 RX ADMIN — ONDANSETRON HYDROCHLORIDE 4 MG: 2 INJECTION, SOLUTION INTRAMUSCULAR; INTRAVENOUS at 06:12

## 2022-12-01 RX ADMIN — DIPHENHYDRAMINE HYDROCHLORIDE 12.5 MG: 50 INJECTION INTRAMUSCULAR; INTRAVENOUS at 08:12

## 2022-12-01 RX ADMIN — KETOROLAC TROMETHAMINE 15 MG: 30 INJECTION, SOLUTION INTRAMUSCULAR at 06:12

## 2022-12-01 NOTE — ED TRIAGE NOTES
Reports to ED c c/o HTN. +HA. +N/V. Denies chest pain or SOB. States compliant c BP medication; however, been throwing it up. Seen at [facility]; no relief

## 2022-12-01 NOTE — ED PROVIDER NOTES
"NAME:  Maru Camacho  CSN:     [id number]  MRN:    [id number]  ADMIT DATE: 12/1/2022        eMERGENCY dEPARTMENT eNCOUnter    CHIEF COMPLAINT    Chief Complaint   Patient presents with    Hypertension     Reports to ED c c/o HTN. +HA. +N/V. Denies chest pain or SOB. States compliant c BP medication; however, been throwing it up. Seen at [facility]; no relief.        HPI      Maru Camacho is a 46 y.o. female who presents to the ED  for evaluation of headache and nausea and vomiting.  For evaluation elevated  blood pressure, headache and nausea and vomiting.  Patient reports symptoms have been ongoing for the past 3 or 4 days.  She denies any abdominal pain.  No fevers.  No sick contacts        ALLERGIES    Review of patient's allergies indicates:   Allergen Reactions    Shellfish containing products Hives       PAST MEDICAL HISTORY  Past Medical History:   Diagnosis Date    Hypertension     Seizures        SURGICAL HISTORY    Past Surgical History:   Procedure Laterality Date    ADENOIDECTOMY      DILATION AND CURETTAGE OF UTERUS         SOCIAL HISTORY    Social History     Socioeconomic History    Marital status: Single   Tobacco Use    Smoking status: Never    Smokeless tobacco: Never   Substance and Sexual Activity    Alcohol use: No    Drug use: Never    Sexual activity: Yes     Partners: Male     Birth control/protection: None       FAMILY HISTORY    History reviewed. No pertinent family history.    REVIEW OF SYSTEMS   ROS  All Systems otherwise negative except as noted in the History of Present Illness.        PHYSICAL EXAM    Reviewed Triage Note  VITAL SIGNS:   ED Triage Vitals [12/01/22 0542]   Enc Vitals Group      BP (!) 206/115      Pulse 75      Resp 19      Temp 98.3 °F (36.8 °C)      Temp src Oral      SpO2 100 %      Weight 231 lb 8 oz      Height 5' 3"      Head Circumference       Peak Flow       Pain Score       Pain Loc       Pain Edu?       Excl. in GC?        Patient Vitals for the past 24 " "hrs:   BP Temp Temp src Pulse Resp SpO2 Height Weight   12/01/22 1017 (!) 163/90 -- -- -- -- -- -- --   12/01/22 0647 (!) 166/97 -- -- 74 19 96 % -- --   12/01/22 0637 -- -- -- 76 -- 95 % -- --   12/01/22 0632 (!) 179/93 -- -- -- -- -- -- --   12/01/22 0621 (!) 178/103 -- -- -- -- -- -- --   12/01/22 0547 (!) 177/95 -- -- 69 19 97 % -- --   12/01/22 0542 (!) 206/115 98.3 °F (36.8 °C) Oral 75 19 100 % 5' 3" (1.6 m) 105 kg (231 lb 8 oz)           Physical Exam    Constitutional:  Well-developed, well-nourished. No acute distress  HENT:  Normocephalic, atraumatic.  Eyes:  EOMI. Conjunctiva normal without discharge.   Neck: Normal range of motion.No stridor. No meningismus.   Respiratory:  Normal breath sounds bilaterally.  No respiratory distress, retractions, or conversational dyspnea. No wheezing. No rhonchi. No rales.   Cardiovascular:  Normal heart rate. Normal rhythm. No pitting lower extremity edema.   GI:  Abdomen soft, non-distended, non-tender. Normal bowel sounds. No guarding, rigidity or rebound.    : No CVA tenderness.   Musculoskeletal:  No gross deformity or limited range of motion of all major joints. No palpable bony deformity. No tenderness to palpation.  Integument:  Warm and dry. No rash.  Neurologic:  Normal motor function. Normal sensory function. No focal deficits noted. Alert and Interactive.  Psychiatric:  Affect normal. Mood normal.         LABS  Pertinent labs reviewed. (See chart for details)   Labs Reviewed   CBC W/ AUTO DIFFERENTIAL - Abnormal; Notable for the following components:       Result Value    Hemoglobin 9.2 (*)     Hematocrit 33.2 (*)     MCV 69 (*)     MCH 19.0 (*)     MCHC 27.7 (*)     RDW 19.9 (*)     All other components within normal limits   COMPREHENSIVE METABOLIC PANEL - Abnormal; Notable for the following components:    Glucose 118 (*)     Calcium 8.3 (*)     AST 14 (*)     Anion Gap 6 (*)     All other components within normal limits   URINALYSIS, REFLEX TO URINE " CULTURE - Abnormal; Notable for the following components:    Appearance, UA Hazy (*)     Specific Gravity, UA >=1.030 (*)     Occult Blood UA Trace (*)     All other components within normal limits    Narrative:     Preferred Collection Type->Urine, Clean Catch  Specimen Source->Urine   LIPASE   SARS-COV-2 RNA AMPLIFICATION, QUAL    Narrative:     Is the patient symptomatic?->Yes         RADIOLOGY    Imaging Results              CT Abdomen Pelvis With Contrast (Final result)  Result time 12/01/22 07:55:57      Final result by Jarek Friedman MD (12/01/22 07:55:57)                   Impression:      Complex cystic structure within the right adnexa could reflect a complex ovarian cyst or large hydrosalpinx. Largest component measures 6 x 5.1 cm.  Recommend follow-up pelvic ultrasound.    See above for additional findings and recommendations.    All CT scans at this facility use dose modulation, iterative reconstruction, and/or weight based dosing when appropriate to reduce radiation dose to as low as reasonable achievable.      Electronically signed by: Jarek Friedman MD  Date:    12/01/2022  Time:    07:55               Narrative:    EXAMINATION:  CT ABDOMEN PELVIS WITH CONTRAST    CLINICAL HISTORY:  Bowel obstruction suspected;    TECHNIQUE:  Low dose axial images, sagittal and coronal reformations were obtained from the lung bases to the pubic symphysis following the IV administration of 100 mL of Omnipaque 350.  Oral contrast was not administered.    COMPARISON:  None    FINDINGS:  Motion limited.    Heart: Normal size. No effusion.    Lung Bases: Clear.    Liver: Normal size and attenuation. No focal lesions.    Gallbladder: No calcified gallstones.    Bile Ducts: No dilatation.    Pancreas: No mass. No peripancreatic fat stranding.    Spleen: Normal.    Adrenals: Normal.    Kidneys/Ureters: Normal enhancement.  Punctate nonobstructing stones seen within the midpole right kidney.  Multiple cysts are noted within  the right kidney measuring up to 15 mm lower pole right kidney.  No mass or  hydroureteronephrosis.    Bladder: Mild nonspecific bladder wall thickening.    Reproductive organs: Mild prominence of the endometrium and cervix.  Correlation with Pap smear.  Complex cystic structure within the right adnexa could reflect a complex ovarian cyst or large hydrosalpinx.  Largest component measures 6 x 5.1 cm.  Recommend follow-up pelvic ultrasound.    GI Tract/Mesentery: Small hiatal hernia.  No evidence of bowel obstruction or inflammation.  Mild constipation.  No evidence of appendicitis.    Peritoneal Space: No ascites or free air.    Retroperitoneum: No significant adenopathy.    Abdominal wall: Normal.    Vasculature: No aneurysm. Tortuosity of the descending aorta can be seen with chronic hypertension.    Bones: No acute fracture. No suspicious lytic or sclerotic lesions.                                      PROCEDURES    Procedures      EKG     Interpreted by ERP:          ED COURSE & MEDICAL DECISION MAKING    Pertinent & Imaging studies reviewed. (See chart for details and specific orders.)        Medications   acetaminophen tablet 1,000 mg (1,000 mg Oral Not Given 12/1/22 0643)   sodium chloride 0.9% bolus 1,000 mL (0 mLs Intravenous Stopped 12/1/22 0843)   ondansetron injection 4 mg (4 mg Intravenous Given 12/1/22 0616)   ketorolac injection 15 mg (15 mg Intravenous Given 12/1/22 0644)   iohexoL (OMNIPAQUE 350) injection 100 mL (100 mLs Intravenous Given 12/1/22 0736)   prochlorperazine injection Soln 10 mg (10 mg Intravenous Given 12/1/22 0845)   diphenhydrAMINE injection 12.5 mg (12.5 mg Intravenous Given 12/1/22 0845)          Normal workup.  Patient complaining of ongoing headaches and she was given Compazine.  Symptoms have now improved.  She will be discharged with Reglan and advised to follow-up with her primary care physician regarding her blood pressure       DISPOSITION  Patient discharged in stable  condition at No discharge date for patient encounter.      DISCHARGE INSTRUCTIONS & MEDS    @DISCHARGEMEDSLIST(<NOROUTINE> error)@      New Prescriptions    METOCLOPRAMIDE HCL (REGLAN) 10 MG TABLET    Take 1 tablet (10 mg total) by mouth every 8 (eight) hours as needed (headache and nausea).           FINAL IMPRESSION    1. Nausea and vomiting, unspecified vomiting type    2. Acute nonintractable headache, unspecified headache type              Blood Pressure Follow-Up Advised  Patient advised to follow up with PCP within 3-5 days for blood pressure re-check if blood pressure is equal to or greater than 120/80.         Critical care time spent with this patient (not including separately billable items) was  0 minutes.     DISCLAIMER: This note was prepared with Dragon NaturallySpeaking voice recognition transcription software. Garbled syntax, mangled pronouns, and other bizarre constructions may be attributed to that software system.      Leonel Mauro MD  12/01/2022  11:01 AM           Leonel Mauro MD  12/01/22 1103

## 2023-05-05 ENCOUNTER — OFFICE VISIT (OUTPATIENT)
Dept: OBSTETRICS AND GYNECOLOGY | Facility: CLINIC | Age: 48
End: 2023-05-05
Payer: MEDICAID

## 2023-05-05 ENCOUNTER — HOSPITAL ENCOUNTER (OUTPATIENT)
Dept: RADIOLOGY | Facility: HOSPITAL | Age: 48
Discharge: HOME | End: 2023-05-05
Payer: MEDICAID

## 2023-05-05 VITALS
DIASTOLIC BLOOD PRESSURE: 110 MMHG | BODY MASS INDEX: 38.95 KG/M2 | WEIGHT: 219.81 LBS | SYSTOLIC BLOOD PRESSURE: 177 MMHG | HEIGHT: 63 IN

## 2023-05-05 DIAGNOSIS — N70.11 HYDROSALPINX: ICD-10-CM

## 2023-05-05 DIAGNOSIS — Z01.419 WELL WOMAN EXAM WITH ROUTINE GYNECOLOGICAL EXAM: ICD-10-CM

## 2023-05-05 DIAGNOSIS — N83.201 CYST OF RIGHT OVARY: ICD-10-CM

## 2023-05-05 DIAGNOSIS — Z11.3 SCREENING FOR STD (SEXUALLY TRANSMITTED DISEASE): ICD-10-CM

## 2023-05-05 DIAGNOSIS — N89.8 VAGINAL DISCHARGE: Primary | ICD-10-CM

## 2023-05-05 PROCEDURE — 99999 PR PBB SHADOW E&M-EST. PATIENT-LVL III: ICD-10-PCS | Mod: PBBFAC,,, | Performed by: OBSTETRICS & GYNECOLOGY

## 2023-05-05 PROCEDURE — 3008F BODY MASS INDEX DOCD: CPT | Mod: CPTII,,, | Performed by: OBSTETRICS & GYNECOLOGY

## 2023-05-05 PROCEDURE — 1159F PR MEDICATION LIST DOCUMENTED IN MEDICAL RECORD: ICD-10-PCS | Mod: CPTII,,, | Performed by: OBSTETRICS & GYNECOLOGY

## 2023-05-05 PROCEDURE — 88175 CYTOPATH C/V AUTO FLUID REDO: CPT

## 2023-05-05 PROCEDURE — 4010F ACE/ARB THERAPY RXD/TAKEN: CPT | Mod: CPTII,,, | Performed by: OBSTETRICS & GYNECOLOGY

## 2023-05-05 PROCEDURE — 76830 TRANSVAGINAL US NON-OB: CPT | Mod: 26,,, | Performed by: RADIOLOGY

## 2023-05-05 PROCEDURE — 4010F PR ACE/ARB THEARPY RXD/TAKEN: ICD-10-PCS | Mod: CPTII,,, | Performed by: OBSTETRICS & GYNECOLOGY

## 2023-05-05 PROCEDURE — 87591 N.GONORRHOEAE DNA AMP PROB: CPT

## 2023-05-05 PROCEDURE — 3008F PR BODY MASS INDEX (BMI) DOCUMENTED: ICD-10-PCS | Mod: CPTII,,, | Performed by: OBSTETRICS & GYNECOLOGY

## 2023-05-05 PROCEDURE — 99213 OFFICE O/P EST LOW 20 MIN: CPT | Mod: PBBFAC,PN,25 | Performed by: OBSTETRICS & GYNECOLOGY

## 2023-05-05 PROCEDURE — 76830 US PELVIS COMP WITH TRANSVAG NON-OB (XPD): ICD-10-PCS | Mod: 26,,, | Performed by: RADIOLOGY

## 2023-05-05 PROCEDURE — 1160F RVW MEDS BY RX/DR IN RCRD: CPT | Mod: CPTII,,, | Performed by: OBSTETRICS & GYNECOLOGY

## 2023-05-05 PROCEDURE — 99396 PR PREVENTIVE VISIT,EST,40-64: ICD-10-PCS | Mod: S$PBB,,, | Performed by: OBSTETRICS & GYNECOLOGY

## 2023-05-05 PROCEDURE — 99396 PREV VISIT EST AGE 40-64: CPT | Mod: S$PBB,,, | Performed by: OBSTETRICS & GYNECOLOGY

## 2023-05-05 PROCEDURE — 76856 US EXAM PELVIC COMPLETE: CPT | Mod: TC,PO

## 2023-05-05 PROCEDURE — 81514 NFCT DS BV&VAGINITIS DNA ALG: CPT | Performed by: OBSTETRICS & GYNECOLOGY

## 2023-05-05 PROCEDURE — 99999 PR PBB SHADOW E&M-EST. PATIENT-LVL III: CPT | Mod: PBBFAC,,, | Performed by: OBSTETRICS & GYNECOLOGY

## 2023-05-05 PROCEDURE — 3077F SYST BP >= 140 MM HG: CPT | Mod: CPTII,,, | Performed by: OBSTETRICS & GYNECOLOGY

## 2023-05-05 PROCEDURE — 76856 US PELVIS COMP WITH TRANSVAG NON-OB (XPD): ICD-10-PCS | Mod: 26,,, | Performed by: RADIOLOGY

## 2023-05-05 PROCEDURE — 3080F PR MOST RECENT DIASTOLIC BLOOD PRESSURE >= 90 MM HG: ICD-10-PCS | Mod: CPTII,,, | Performed by: OBSTETRICS & GYNECOLOGY

## 2023-05-05 PROCEDURE — 1159F MED LIST DOCD IN RCRD: CPT | Mod: CPTII,,, | Performed by: OBSTETRICS & GYNECOLOGY

## 2023-05-05 PROCEDURE — 3080F DIAST BP >= 90 MM HG: CPT | Mod: CPTII,,, | Performed by: OBSTETRICS & GYNECOLOGY

## 2023-05-05 PROCEDURE — 1160F PR REVIEW ALL MEDS BY PRESCRIBER/CLIN PHARMACIST DOCUMENTED: ICD-10-PCS | Mod: CPTII,,, | Performed by: OBSTETRICS & GYNECOLOGY

## 2023-05-05 PROCEDURE — 76856 US EXAM PELVIC COMPLETE: CPT | Mod: 26,,, | Performed by: RADIOLOGY

## 2023-05-05 PROCEDURE — 3077F PR MOST RECENT SYSTOLIC BLOOD PRESSURE >= 140 MM HG: ICD-10-PCS | Mod: CPTII,,, | Performed by: OBSTETRICS & GYNECOLOGY

## 2023-05-05 RX ORDER — CLONIDINE HYDROCHLORIDE 0.1 MG/1
0.1 TABLET ORAL
COMMUNITY
Start: 2023-01-13 | End: 2023-08-28

## 2023-05-05 NOTE — PROGRESS NOTES
"CC: Annual check-up and STD testing    SUBJECTIVE:   47 y.o. female   for annual routine Pap and need for STD testing. No LMP recorded. Patient with last LMP on 23. Normal cycles and no abnormal discharge noted.  Patient with same partner however she notes that partner "may be sleeping around" and would like to get tested herself. Patient also with history of ovarian cyst. Last imaging completed was CT abdomen and notable for complex cystic structure within the right adnexa measuring 6 x 5.1cm. BP also elevated today but patient just took her BP medications prior to visit.       Past Medical History:   Diagnosis Date    Hypertension     Seizures      Past Surgical History:   Procedure Laterality Date    ADENOIDECTOMY      DILATION AND CURETTAGE OF UTERUS       Social History     Socioeconomic History    Marital status: Single   Tobacco Use    Smoking status: Never    Smokeless tobacco: Never   Substance and Sexual Activity    Alcohol use: No    Drug use: Never    Sexual activity: Yes     Partners: Male     Birth control/protection: None     No family history on file.  OB History    Para Term  AB Living   4 2 1 1 2 2   SAB IAB Ectopic Multiple Live Births   1   1   2      # Outcome Date GA Lbr Chris/2nd Weight Sex Delivery Anes PTL Lv   4  00 35w0d   F Vag-Spont   KIERA   3 Ectopic 1998           2 Term     F Vag-Spont   KIERA   1 SAB                  Current Outpatient Medications   Medication Sig Dispense Refill    amLODIPine (NORVASC) 10 MG tablet Take 10 mg by mouth once daily.      cloNIDine (CATAPRES) 0.1 MG tablet Take 0.1 mg by mouth.      losartan (COZAAR) 50 MG tablet TAKE 1 TABLET(50 MG) BY MOUTH EVERY DAY 30 tablet 4    bacitracin-polymyxin b (POLYSPORIN) ophthalmic ointment Place into the left eye 4 (four) times daily. (Patient not taking: Reported on 6/3/2022) 15 g 0    butalbital-acetaminophen-caff -40 mg Cap Take by mouth.      chlorthalidone (HYGROTEN) " 25 MG Tab Take 12.5 mg by mouth once daily.      ferrous sulfate 325 (65 FE) MG EC tablet Take 1 tablet (325 mg total) by mouth once daily. (Patient not taking: Reported on 5/5/2023)  0    hydroCHLOROthiazide (HYDRODIURIL) 25 MG tablet Take 1 tablet (25 mg total) by mouth once daily. (Patient not taking: Reported on 5/5/2023) 30 tablet 4    metoclopramide HCl (REGLAN) 10 MG tablet Take 1 tablet (10 mg total) by mouth every 8 (eight) hours as needed (headache and nausea). (Patient not taking: Reported on 5/5/2023) 20 tablet 0    NIFEdipine (PROCARDIA-XL) 60 MG (OSM) 24 hr tablet TAKE 1 TABLET(60 MG) BY MOUTH EVERY DAY (Patient not taking: Reported on 6/3/2022) 30 tablet 4    pravastatin (PRAVACHOL) 40 MG tablet Take 1 tablet (40 mg total) by mouth once daily. (Patient not taking: Reported on 5/5/2023) 90 tablet 3     No current facility-administered medications for this visit.     Allergies: Shellfish containing products     ROS:  Constitutional: no weight loss, weight gain, fever, fatigue  Eyes:  No vision changes, glasses/contacts  ENT/Mouth: No ulcers, sinus problems, ears ringing, headache  Cardiovascular: No inability to lie flat, chest pain, exercise intolerance, swelling, heart palpitations  Respiratory: No wheezing, coughing blood, shortness of breath, or cough  Gastrointestinal: No diarrhea, bloody stool, nausea/vomiting, constipation, gas, hemorrhoids  Genitourinary: No blood in urine, painful urination, urgency of urination, frequency of urination, incomplete emptying, incontinence, abnormal bleeding, painful periods, heavy periods, vaginal discharge, vaginal odor, painful intercourse, sexual problems, bleeding after intercourse.  Musculoskeletal: No muscle weakness  Skin/Breast: No painful breasts, nipple discharge, masses, rash, ulcers  Neurological: No passing out, seizures, numbness, headache  Endocrine: No diabetes, hypothyroid, hyperthyroid, hot flashes, hair loss, abnormal hair growth,  "ance  Psychiatric: No depression, crying  Hematologic: No bruises, bleeding, swollen lymph nodes, anemia.      OBJECTIVE:   The patient appears well, alert, oriented x 3, in no distress.  BP (!) 177/110   Ht 5' 3" (1.6 m)   Wt 99.7 kg (219 lb 12.8 oz)   BMI 38.94 kg/m²   NECK: negative, no thyromegaly, trachea midline  SKIN: normal and no acne, striae, hirsutism  BREAST EXAM: breasts appear normal, no suspicious masses, no skin or nipple changes or axillary nodes  ABDOMEN: no hernias, masses, or hepatosplenomegaly  GENITALIA: normal external genitalia, no erythema, no discharge, no vaginal discharge, normal vagina and normal vaginal tone, and normal cervix  URETHRA: normal urethra, normal urethral meatus  VAGINA: Normal  CERVIX: no lesions or cervical motion tenderness  UTERUS: normal  ADNEXA: no mass, fullness, tenderness      ASSESSMENT:   well woman  1. Screening for STD (sexually transmitted disease)    2. Well woman exam with routine gynecological exam    3. Cyst of right ovary        PLAN:   mammogram  pap smear  STD testing completed today  Ordered TVUS for further evaluation of potential ovarian cyst  return annually or prn    Orders Placed This Encounter    C. trachomatis/N. gonorrhoeae by AMP DNA [facility]; Cervix    Mammo Digital Screening Bilat w/ William    US Transvaginal Non OB    HIV 1/2 Ag/Ab (4th Gen)    POCT Vaginosis Screen by DNA Probe ([facility])    Liquid-Based Pap Smear, Screening       "

## 2023-05-06 LAB
C TRACH DNA SPEC QL NAA+PROBE: NOT DETECTED
N GONORRHOEA DNA SPEC QL NAA+PROBE: NOT DETECTED

## 2023-05-08 ENCOUNTER — TELEPHONE (OUTPATIENT)
Dept: OBSTETRICS AND GYNECOLOGY | Facility: CLINIC | Age: 48
End: 2023-05-08
Payer: MEDICAID

## 2023-05-08 DIAGNOSIS — R76.8 BIOLOGICAL FALSE POSITIVE RPR TEST: Primary | ICD-10-CM

## 2023-05-08 LAB
BACTERIAL VAGINOSIS DNA: POSITIVE
CANDIDA GLABRATA DNA: NEGATIVE
CANDIDA KRUSEI DNA: NEGATIVE
CANDIDA RRNA VAG QL PROBE: POSITIVE
T VAGINALIS RRNA GENITAL QL PROBE: NEGATIVE

## 2023-05-08 RX ORDER — TERCONAZOLE 8 MG/G
1 CREAM VAGINAL NIGHTLY
Qty: 20 G | Refills: 1 | Status: SHIPPED | OUTPATIENT
Start: 2023-05-08 | End: 2023-08-28

## 2023-05-08 RX ORDER — METRONIDAZOLE 500 MG/1
500 TABLET ORAL EVERY 12 HOURS
Qty: 14 TABLET | Refills: 0 | Status: SHIPPED | OUTPATIENT
Start: 2023-05-08 | End: 2023-08-28

## 2023-05-08 NOTE — TELEPHONE ENCOUNTER
Informed pt has a False + RPR  FTAbs was - meaning she does not have syphillis  Rpt FTAbs again in 3-4 months order placed for 8/15/23  Pt verbalized understanding.

## 2023-05-08 NOTE — TELEPHONE ENCOUNTER
She has a False + RPR  FTAbs was - meaning she does not have syphillis  Rpt FTAbs again in 3-4 months order placed for 8/15/23  Notify pt

## 2023-05-09 ENCOUNTER — LAB VISIT (OUTPATIENT)
Dept: LAB | Facility: HOSPITAL | Age: 48
End: 2023-05-09
Payer: MEDICAID

## 2023-05-09 DIAGNOSIS — Z11.3 SCREENING FOR STD (SEXUALLY TRANSMITTED DISEASE): ICD-10-CM

## 2023-05-09 LAB — HIV 1+2 AB+HIV1 P24 AG SERPL QL IA: NORMAL

## 2023-05-09 PROCEDURE — 87389 HIV-1 AG W/HIV-1&-2 AB AG IA: CPT | Mod: PO

## 2023-05-09 PROCEDURE — 36415 COLL VENOUS BLD VENIPUNCTURE: CPT | Mod: PO

## 2023-05-12 LAB
FINAL PATHOLOGIC DIAGNOSIS: NORMAL
Lab: NORMAL

## 2023-05-25 ENCOUNTER — HOSPITAL ENCOUNTER (OUTPATIENT)
Dept: RADIOLOGY | Facility: HOSPITAL | Age: 48
Discharge: HOME | End: 2023-05-25
Payer: MEDICAID

## 2023-05-25 DIAGNOSIS — Z01.419 WELL WOMAN EXAM WITH ROUTINE GYNECOLOGICAL EXAM: ICD-10-CM

## 2023-05-25 PROCEDURE — 77067 SCR MAMMO BI INCL CAD: CPT | Mod: TC,PO

## 2023-05-25 PROCEDURE — 77067 SCR MAMMO BI INCL CAD: CPT | Mod: 26,,, | Performed by: RADIOLOGY

## 2023-05-25 PROCEDURE — 77067 MAMMO DIGITAL SCREENING BILAT WITH TOMO: ICD-10-PCS | Mod: 26,,, | Performed by: RADIOLOGY

## 2023-05-25 PROCEDURE — 77063 BREAST TOMOSYNTHESIS BI: CPT | Mod: 26,,, | Performed by: RADIOLOGY

## 2023-05-25 PROCEDURE — 77063 MAMMO DIGITAL SCREENING BILAT WITH TOMO: ICD-10-PCS | Mod: 26,,, | Performed by: RADIOLOGY

## 2023-07-06 ENCOUNTER — LAB VISIT (OUTPATIENT)
Dept: LAB | Facility: HOSPITAL | Age: 48
End: 2023-07-06
Attending: OBSTETRICS & GYNECOLOGY
Payer: MEDICAID

## 2023-07-06 ENCOUNTER — OFFICE VISIT (OUTPATIENT)
Dept: OBSTETRICS AND GYNECOLOGY | Facility: CLINIC | Age: 48
End: 2023-07-06
Payer: MEDICAID

## 2023-07-06 VITALS
WEIGHT: 230.19 LBS | BODY MASS INDEX: 40.79 KG/M2 | SYSTOLIC BLOOD PRESSURE: 200 MMHG | DIASTOLIC BLOOD PRESSURE: 15 MMHG | HEIGHT: 63 IN

## 2023-07-06 DIAGNOSIS — N70.11 HYDROSALPINX: ICD-10-CM

## 2023-07-06 DIAGNOSIS — N83.201 RIGHT OVARIAN CYST: ICD-10-CM

## 2023-07-06 DIAGNOSIS — N92.0 MENORRHAGIA WITH REGULAR CYCLE: ICD-10-CM

## 2023-07-06 DIAGNOSIS — I10 ESSENTIAL HYPERTENSION: ICD-10-CM

## 2023-07-06 DIAGNOSIS — N92.0 MENORRHAGIA WITH REGULAR CYCLE: Primary | ICD-10-CM

## 2023-07-06 LAB
ALBUMIN SERPL BCP-MCNC: 4.4 G/DL (ref 3.5–5.2)
ALP SERPL-CCNC: 79 U/L (ref 38–126)
ALT SERPL W/O P-5'-P-CCNC: 13 U/L (ref 10–44)
ANION GAP SERPL CALC-SCNC: 11 MMOL/L (ref 8–16)
AST SERPL-CCNC: 21 U/L (ref 15–46)
BASOPHILS # BLD AUTO: 0.06 K/UL (ref 0–0.2)
BASOPHILS NFR BLD: 0.7 % (ref 0–1.9)
BILIRUB SERPL-MCNC: 0.5 MG/DL (ref 0.1–1)
CALCIUM SERPL-MCNC: 8.9 MG/DL (ref 8.7–10.5)
CHLORIDE SERPL-SCNC: 107 MMOL/L (ref 95–110)
CO2 SERPL-SCNC: 24 MMOL/L (ref 23–29)
CREAT SERPL-MCNC: 0.67 MG/DL (ref 0.5–1.4)
DIFFERENTIAL METHOD: ABNORMAL
EOSINOPHIL # BLD AUTO: 0.1 K/UL (ref 0–0.5)
EOSINOPHIL NFR BLD: 1.4 % (ref 0–8)
ERYTHROCYTE [DISTWIDTH] IN BLOOD BY AUTOMATED COUNT: 19.4 % (ref 11.5–14.5)
EST. GFR  (NO RACE VARIABLE): >60 ML/MIN/1.73 M^2
FSH SERPL-ACNC: 4.03 MIU/ML
GLUCOSE SERPL-MCNC: 80 MG/DL (ref 70–110)
HCT VFR BLD AUTO: 32 % (ref 37–48.5)
HGB BLD-MCNC: 9.1 G/DL (ref 12–16)
IMM GRANULOCYTES # BLD AUTO: 0.02 K/UL (ref 0–0.04)
IMM GRANULOCYTES NFR BLD AUTO: 0.2 % (ref 0–0.5)
LYMPHOCYTES # BLD AUTO: 2.9 K/UL (ref 1–4.8)
LYMPHOCYTES NFR BLD: 34 % (ref 18–48)
MCH RBC QN AUTO: 19.7 PG (ref 27–31)
MCHC RBC AUTO-ENTMCNC: 28.4 G/DL (ref 32–36)
MCV RBC AUTO: 69 FL (ref 82–98)
MONOCYTES # BLD AUTO: 0.6 K/UL (ref 0.3–1)
MONOCYTES NFR BLD: 6.5 % (ref 4–15)
NEUTROPHILS # BLD AUTO: 4.8 K/UL (ref 1.8–7.7)
NEUTROPHILS NFR BLD: 57.2 % (ref 38–73)
NRBC BLD-RTO: 0 /100 WBC
PLATELET # BLD AUTO: 302 K/UL (ref 150–450)
PMV BLD AUTO: ABNORMAL FL (ref 9.2–12.9)
POTASSIUM SERPL-SCNC: 4 MMOL/L (ref 3.5–5.1)
PROT SERPL-MCNC: 8 G/DL (ref 6–8.4)
RBC # BLD AUTO: 4.62 M/UL (ref 4–5.4)
SODIUM SERPL-SCNC: 142 MMOL/L (ref 136–145)
TSH SERPL DL<=0.005 MIU/L-ACNC: 2.48 UIU/ML (ref 0.4–4)
UUN UR-MCNC: 11 MG/DL (ref 7–17)
WBC # BLD AUTO: 8.41 K/UL (ref 3.9–12.7)

## 2023-07-06 PROCEDURE — 3077F SYST BP >= 140 MM HG: CPT | Mod: CPTII,,, | Performed by: OBSTETRICS & GYNECOLOGY

## 2023-07-06 PROCEDURE — 99999 PR PBB SHADOW E&M-EST. PATIENT-LVL III: CPT | Mod: PBBFAC,,, | Performed by: OBSTETRICS & GYNECOLOGY

## 2023-07-06 PROCEDURE — 1159F MED LIST DOCD IN RCRD: CPT | Mod: CPTII,,, | Performed by: OBSTETRICS & GYNECOLOGY

## 2023-07-06 PROCEDURE — 84443 ASSAY THYROID STIM HORMONE: CPT | Mod: PO | Performed by: OBSTETRICS & GYNECOLOGY

## 2023-07-06 PROCEDURE — 1160F RVW MEDS BY RX/DR IN RCRD: CPT | Mod: CPTII,,, | Performed by: OBSTETRICS & GYNECOLOGY

## 2023-07-06 PROCEDURE — 3008F PR BODY MASS INDEX (BMI) DOCUMENTED: ICD-10-PCS | Mod: CPTII,,, | Performed by: OBSTETRICS & GYNECOLOGY

## 2023-07-06 PROCEDURE — 80053 COMPREHEN METABOLIC PANEL: CPT | Mod: PO | Performed by: OBSTETRICS & GYNECOLOGY

## 2023-07-06 PROCEDURE — 99213 OFFICE O/P EST LOW 20 MIN: CPT | Mod: PBBFAC,PN | Performed by: OBSTETRICS & GYNECOLOGY

## 2023-07-06 PROCEDURE — 85025 COMPLETE CBC W/AUTO DIFF WBC: CPT | Mod: PO | Performed by: OBSTETRICS & GYNECOLOGY

## 2023-07-06 PROCEDURE — 1159F PR MEDICATION LIST DOCUMENTED IN MEDICAL RECORD: ICD-10-PCS | Mod: CPTII,,, | Performed by: OBSTETRICS & GYNECOLOGY

## 2023-07-06 PROCEDURE — 4010F PR ACE/ARB THEARPY RXD/TAKEN: ICD-10-PCS | Mod: CPTII,,, | Performed by: OBSTETRICS & GYNECOLOGY

## 2023-07-06 PROCEDURE — 36415 COLL VENOUS BLD VENIPUNCTURE: CPT | Mod: PO | Performed by: OBSTETRICS & GYNECOLOGY

## 2023-07-06 PROCEDURE — 83001 ASSAY OF GONADOTROPIN (FSH): CPT | Mod: PO | Performed by: OBSTETRICS & GYNECOLOGY

## 2023-07-06 PROCEDURE — 99214 PR OFFICE/OUTPT VISIT, EST, LEVL IV, 30-39 MIN: ICD-10-PCS | Mod: 57,S$PBB,, | Performed by: OBSTETRICS & GYNECOLOGY

## 2023-07-06 PROCEDURE — 99214 OFFICE O/P EST MOD 30 MIN: CPT | Mod: 57,S$PBB,, | Performed by: OBSTETRICS & GYNECOLOGY

## 2023-07-06 PROCEDURE — 3008F BODY MASS INDEX DOCD: CPT | Mod: CPTII,,, | Performed by: OBSTETRICS & GYNECOLOGY

## 2023-07-06 PROCEDURE — 99999 PR PBB SHADOW E&M-EST. PATIENT-LVL III: ICD-10-PCS | Mod: PBBFAC,,, | Performed by: OBSTETRICS & GYNECOLOGY

## 2023-07-06 PROCEDURE — 3078F PR MOST RECENT DIASTOLIC BLOOD PRESSURE < 80 MM HG: ICD-10-PCS | Mod: CPTII,,, | Performed by: OBSTETRICS & GYNECOLOGY

## 2023-07-06 PROCEDURE — 1160F PR REVIEW ALL MEDS BY PRESCRIBER/CLIN PHARMACIST DOCUMENTED: ICD-10-PCS | Mod: CPTII,,, | Performed by: OBSTETRICS & GYNECOLOGY

## 2023-07-06 PROCEDURE — 4010F ACE/ARB THERAPY RXD/TAKEN: CPT | Mod: CPTII,,, | Performed by: OBSTETRICS & GYNECOLOGY

## 2023-07-06 PROCEDURE — 3078F DIAST BP <80 MM HG: CPT | Mod: CPTII,,, | Performed by: OBSTETRICS & GYNECOLOGY

## 2023-07-06 PROCEDURE — 3077F PR MOST RECENT SYSTOLIC BLOOD PRESSURE >= 140 MM HG: ICD-10-PCS | Mod: CPTII,,, | Performed by: OBSTETRICS & GYNECOLOGY

## 2023-07-06 NOTE — PROGRESS NOTES
"CC:  Chief Complaint   Patient presents with    Follow-up       HPI:    47 y.o.   OB History          4    Para   2    Term   1       1    AB   2    Living   2         SAB   1    IAB        Ectopic   1    Multiple        Live Births   2               Complaining of: 5 day cycles, heavy on day 2, passes clots  Also has rt hydrosalpinx and pain     (Not in a hospital admission)      Review of patient's allergies indicates:   Allergen Reactions    Shellfish containing products Hives        Past Medical History:   Diagnosis Date    Hypertension     Seizures      Past Surgical History:   Procedure Laterality Date    ADENOIDECTOMY      DILATION AND CURETTAGE OF UTERUS       No family history on file.  Social History     Tobacco Use    Smoking status: Never    Smokeless tobacco: Never   Substance Use Topics    Alcohol use: No    Drug use: Never     ROS:  GENERAL: Feeling well overall. Denies fever or chills.   SKIN: Denies rash or lesions.   HEAD: Denies head injury or headache.   NODES: Denies enlarged lymph nodes.   CHEST: Denies chest pain or shortness of breath.   CARDIOVASCULAR: Denies palpitations or left sided chest pain.    ABDOMEN: Denies diarrhea, nausea, vomiting or rectal bleeding.   URINARY: No dysuria, hematuria, or burning on urination.  REPRODUCTIVE: See HPI.   BREASTS: Denies pain, lumps, or nipple discharge.   HEMATOLOGIC: No easy bruisability or excessive bleeding.   MUSCULOSKELETAL: Denies joint pain or swelling.   NEUROLOGIC: Denies syncope or weakness.   PSYCHIATRIC: Denies depression, anxiety or mood swings.      PE: BP (!) 200/15   Ht 5' 3" (1.6 m)   Wt 104.4 kg (230 lb 2.6 oz)   BMI 40.77 kg/m²      APPEARANCE: Well nourished, well developed, in no acute distress.  SKIN: Normal skin turgor, no lesions.  NECK: Neck symmetric without masses or thyromegaly.  NODES: No inguinal, cervical, axillary or femoral lymph node enlargement.  CARDIOVASCULAR: Normal S1, S2. No rubs, murmurs or " gallops.  NEUROLOGIC: Normal mood and affect. No depression or anxiety.   ABDOMEN: Soft. No tenderness or masses. No hepatosplenomegaly. No hernias.  RESPIRATORY: Normal respiratory effort with no retractions or use of accessory muscles.    CLINICAL HISTORY:  Right hydrosalpinx     TECHNIQUE:  Multiple static ultrasound images are submitted for interpretation.     COMPARISON:  09/16/2019     FINDINGS:  The uterus is normal in appearance.  It measures 9.6 cm in craniocaudal dimension by 4.5 cm in AP dimension by 5.1 cm in mediolateral dimension.  The endometrial stripe thickness measures 7 mm.     The right ovary is enlarged.  It measures 6.6 cm x 3.3 cm x 4.9 cm.  It largely consists of a 36 mm cyst.  On the prior examination the cyst measured 45 mm.  There is a fluid-filled tubular structure in the right adnexal region that has a diameter 4 cm.  On the prior examination it had a diameter of 3 cm.     The left ovary was not visualized.  The urinary bladder was not well distended.  There is no significant amount of free fluid visualized within the pelvis.     Impression:     1. There is a fluid-filled tubular structure in the right adnexal region that has a diameter 4 cm.  On the prior examination it had a diameter of 3 cm.  This is consistent with the patient's history and characteristic of hydrosalpinx.  2. The right ovary is enlarged. It measures 6.6 cm x 3.3 cm x 4.9 cm. It largely consists of a 36 mm cyst.  On the prior examination the cyst measured 45 mm.  An enlarged ovary is at increased risk for torsion.  3. The left ovary was not visualized.  4. The uterus is normal in appearance.        Electronically signed by: Santana Ace MD  Date:                                            05/05/2023  Time:                                           15:16  ASSESSMENT/ PLAN    Maru was seen today for follow-up.    Diagnoses and all orders for this visit:    Menorrhagia with regular cycle  -     CBC Auto Differential;  Future  -     Follicle Stimulating Hormone; Future  -     TSH; Future  -     Comprehensive metabolic panel; Future    Right ovarian cyst    Hydrosalpinx    Essential hypertension        Control BP  F/u for embx after BP controlled  Will schedule robotic TLH/BS poss ovarian cystectomy  Feosol bid  Nsaid sfor pain control    Griffin Dailey MD

## 2023-08-01 ENCOUNTER — HOSPITAL ENCOUNTER (EMERGENCY)
Facility: HOSPITAL | Age: 48
Discharge: HOME | End: 2023-08-01
Attending: STUDENT IN AN ORGANIZED HEALTH CARE EDUCATION/TRAINING PROGRAM
Payer: MEDICAID

## 2023-08-01 VITALS
BODY MASS INDEX: 39.51 KG/M2 | WEIGHT: 223 LBS | RESPIRATION RATE: 17 BRPM | DIASTOLIC BLOOD PRESSURE: 108 MMHG | HEART RATE: 82 BPM | SYSTOLIC BLOOD PRESSURE: 179 MMHG | HEIGHT: 63 IN | OXYGEN SATURATION: 99 % | TEMPERATURE: 99 F

## 2023-08-01 DIAGNOSIS — Z02.89 ENCOUNTER TO OBTAIN EXCUSE FROM WORK: Primary | ICD-10-CM

## 2023-08-01 PROCEDURE — 99281 EMR DPT VST MAYX REQ PHY/QHP: CPT | Mod: ER

## 2023-08-01 RX ORDER — HYDROCHLOROTHIAZIDE 12.5 MG/1
12.5 TABLET ORAL EVERY MORNING
COMMUNITY
Start: 2023-07-12 | End: 2023-08-28

## 2023-08-01 RX ORDER — LOSARTAN POTASSIUM 100 MG/1
100 TABLET ORAL
COMMUNITY
Start: 2023-07-12

## 2023-08-02 NOTE — ED PROVIDER NOTES
Encounter Date: 8/1/2023       History     Chief Complaint   Patient presents with    Letter for School/Work     PT to the ED for excuse to return back to work. PT states she has not symptoms at this time. Had fever yesterday.      47-year-old female presenting to ED requesting a note saying she may return to work.  Patient reports not feeling well last night, waking up and not feeling well this morning.  Patient reports taking her blood pressure medication, feeling hot and nauseated in her car on way to work and vomited several times.  Patient reports she went home and missed work today.  Patient reports that she currently feels fine has no symptoms at this time, no current physical complaints being asymptomatic.    The history is provided by the patient. No  was used.     Review of patient's allergies indicates:   Allergen Reactions    Shellfish containing products Hives     Past Medical History:   Diagnosis Date    Hypertension     Seizures      Past Surgical History:   Procedure Laterality Date    ADENOIDECTOMY      DILATION AND CURETTAGE OF UTERUS       History reviewed. No pertinent family history.  Social History     Tobacco Use    Smoking status: Never    Smokeless tobacco: Never   Substance Use Topics    Alcohol use: No    Drug use: Never     Review of Systems   Constitutional:  Negative for chills, diaphoresis, fatigue and fever.   HENT:  Negative for congestion, ear pain, sinus pain, sore throat and trouble swallowing.    Eyes:  Negative for pain and visual disturbance.   Respiratory:  Negative for cough, shortness of breath, wheezing and stridor.    Cardiovascular:  Negative for chest pain and palpitations.   Gastrointestinal:  Negative for abdominal pain, diarrhea, nausea and vomiting.   Endocrine: Negative.    Genitourinary:  Negative for dysuria, flank pain and hematuria.   Musculoskeletal:  Negative for back pain, myalgias and neck pain.   Skin: Negative.    Allergic/Immunologic:  Negative.    Neurological:  Negative for dizziness, weakness and headaches.   Hematological: Negative.    Psychiatric/Behavioral: Negative.     All other systems reviewed and are negative.      Physical Exam     Initial Vitals [08/01/23 1912]   BP Pulse Resp Temp SpO2   (!) 179/108 82 17 98.6 °F (37 °C) 99 %      MAP       --         Physical Exam    Nursing note and vitals reviewed.  Constitutional: Vital signs are normal. She appears well-developed and well-nourished. She is not diaphoretic. No distress.   47-year-old female sitting upright in no acute distress, nontoxic, alert and oriented, breathing comfortably on room air, clinically well-appearing   HENT:   Head: Normocephalic and atraumatic.   Right Ear: Hearing and external ear normal.   Left Ear: Hearing and external ear normal.   Nose: Nose normal.   Eyes: Conjunctivae and EOM are normal. Pupils are equal, round, and reactive to light.   Neck: Trachea normal. Neck supple.   Normal range of motion.  Cardiovascular:  Normal rate, regular rhythm and normal pulses.           Pulmonary/Chest: Effort normal. No accessory muscle usage. No tachypnea. No respiratory distress.   Respirations even and unlabored   Musculoskeletal:         General: Normal range of motion.      Cervical back: Normal range of motion and neck supple.     Neurological: She is alert and oriented to person, place, and time. She has normal strength. She displays no tremor. She exhibits normal muscle tone. She displays no seizure activity. Coordination normal. GCS score is 15. GCS eye subscore is 4. GCS verbal subscore is 5. GCS motor subscore is 6.   Skin: Skin is warm and dry. Capillary refill takes less than 2 seconds.         ED Course   Procedures  Labs Reviewed - No data to display       Imaging Results    None          Medications - No data to display                           Clinical Impression:   Final diagnoses:  [Z02.89] Encounter to obtain excuse from work (Primary)        ED  Disposition Condition    Discharge Stable          ED Prescriptions    None       Follow-up Information       Follow up With Specialties Details Why Contact Info    PRIMARY CARE MD  Schedule an appointment as soon as possible for a visit in 3 days If symptoms worsen     [facility] - Emergency Dept Emergency Medicine Go to  If symptoms worsen [address] [facility] [id number] [phone]          PATIENT SEEN BY PAMELA ONLY    DISCUSSED CARE PLAN WITH PATIENT.  SHE IS CURRENTLY ASYMPTOMATIC FEELING MUCH BETTER THAN SHE DID THIS MORNING.  PATIENT GIVEN WORK NOTE TO RETURN IN 2 DAYS, GIVING HER FURTHER TIME OF REST.  PATIENT IS HYPERTENSIVE CURRENTLY, ADMITS THAT SHE DOES NOT LIKE, [facility].  CLOSE FOLLOW-UP WITH PCP FOR CONTINUED CARE.  PATIENT IS STABLE, ALL QUESTIONS ANSWERED READY FOR DISCHARGE.  NO EMERGENCY WORKUP CURRENTLY INDICATED  AT THIS TIME.     Bernarda Richard PA-C  08/01/23 1948

## 2023-08-03 ENCOUNTER — PROCEDURE VISIT (OUTPATIENT)
Dept: OBSTETRICS AND GYNECOLOGY | Facility: CLINIC | Age: 48
End: 2023-08-03
Payer: MEDICAID

## 2023-08-03 VITALS
WEIGHT: 226.19 LBS | SYSTOLIC BLOOD PRESSURE: 154 MMHG | DIASTOLIC BLOOD PRESSURE: 90 MMHG | BODY MASS INDEX: 40.07 KG/M2

## 2023-08-03 DIAGNOSIS — N83.201 CYST OF RIGHT OVARY: ICD-10-CM

## 2023-08-03 DIAGNOSIS — N92.0 MENORRHAGIA WITH REGULAR CYCLE: Primary | ICD-10-CM

## 2023-08-03 DIAGNOSIS — N70.11 HYDROSALPINX: ICD-10-CM

## 2023-08-03 PROCEDURE — 88305 TISSUE EXAM BY PATHOLOGIST: CPT | Mod: 26,,, | Performed by: PATHOLOGY

## 2023-08-03 PROCEDURE — 58100 PR BIOPSY OF UTERUS LINING: ICD-10-PCS | Mod: S$PBB,,, | Performed by: OBSTETRICS & GYNECOLOGY

## 2023-08-03 PROCEDURE — 58100 BIOPSY OF UTERUS LINING: CPT | Mod: S$PBB,,, | Performed by: OBSTETRICS & GYNECOLOGY

## 2023-08-03 PROCEDURE — 88305 TISSUE EXAM BY PATHOLOGIST: CPT | Performed by: PATHOLOGY

## 2023-08-03 PROCEDURE — 58100 BIOPSY OF UTERUS LINING: CPT | Mod: PBBFAC,PN | Performed by: OBSTETRICS & GYNECOLOGY

## 2023-08-03 PROCEDURE — 99214 OFFICE O/P EST MOD 30 MIN: CPT | Mod: S$PBB,25,, | Performed by: OBSTETRICS & GYNECOLOGY

## 2023-08-03 PROCEDURE — 99214 PR OFFICE/OUTPT VISIT, EST, LEVL IV, 30-39 MIN: ICD-10-PCS | Mod: S$PBB,25,, | Performed by: OBSTETRICS & GYNECOLOGY

## 2023-08-03 PROCEDURE — 88305 TISSUE EXAM BY PATHOLOGIST: ICD-10-PCS | Mod: 26,,, | Performed by: PATHOLOGY

## 2023-08-03 NOTE — PROCEDURES
Endometrial biopsy    Date/Time: 8/3/2023 1:00 PM    Performed by: Griffin Dailey MD  Authorized by: Griffin Dailey MD    Consent:     Consent obtained:  Written    Consent given by:  Patient    Patient questions answered: yes      Patient agrees, verbalizes understanding, and wants to proceed: yes      Educational handouts given: no      Instructions and paperwork completed: no    Indication:     Indications: Menorrhagia    Pre-procedure:     Pre-procedure timeout performed: yes    Procedure:     Procedure: endometrial biopsy with Pipelle      Cervix cleaned and prepped: yes      A paracervical block was performed: no      An intracervical block was performed: no      The cervix was dilated: no      Uterus sounded: yes      Specimen collected: specimen collected and sent to pathology      Patient tolerated procedure well with no complications: yes    CC: AUB    Maru Camacho is a 47 y.o. female  presents with complaint of abnormal uterine bleeding.  She reportsclots,reports heavy bleeding,denies double protection, and denies accidents.      Past Medical History:   Diagnosis Date    Hypertension     Seizures      Past Surgical History:   Procedure Laterality Date    ADENOIDECTOMY      DILATION AND CURETTAGE OF UTERUS       Social History     Socioeconomic History    Marital status: Single   Tobacco Use    Smoking status: Never    Smokeless tobacco: Never   Substance and Sexual Activity    Alcohol use: No    Drug use: Never    Sexual activity: Yes     Partners: Male     Birth control/protection: None     History reviewed. No pertinent family history.  OB History          4    Para   2    Term   1       1    AB   2    Living   2         SAB   1    IAB        Ectopic   1    Multiple        Live Births   2                 BP (!) 154/90   Wt 102.6 kg (226 lb 3.2 oz)   LMP 07/10/2023 (Exact Date)   BMI 40.07 kg/m²     ROS:  Constitutional: no weight loss, weight gain, fever,  fatigue  Eyes:  No vision changes, glasses/contacts  ENT/Mouth: No ulcers, sinus problems, ears ringing, headache  Cardiovascular: No inability to lie flat, chest pain, exercise intolerance, swelling, heart palpitations  Respiratory: No wheezing, coughing blood, shortness of breath, or cough  Gastrointestinal: No diarrhea, bloody stool, nausea/vomiting, constipation, gas, hemorrhoids  Genitourinary: No blood in urine, painful urination, urgency of urination, frequency of urination, incomplete emptying, incontinence,  painful periods, , vaginal discharge, vaginal odor, painful intercourse, sexual problems, bleeding after intercourse.  Musculoskeletal: No muscle weakness  Skin/Breast: No painful breasts, nipple discharge, masses, rash, ulcers  Neurological: No passing out, seizures, numbness, headache  Endocrine: No diabetes, hypothyroid, hyperthyroid, hot flashes, hair loss, abnormal hair growth, ance  Psychiatric: No depression, crying  Hematologic: No bruises, bleeding, swollen lymph nodes, anemia.      OBJECTIVE:   The patient appears well, alert, oriented x 3, in no distress.  BP (!) 154/90   Wt 102.6 kg (226 lb 3.2 oz)   LMP 07/10/2023 (Exact Date)   BMI 40.07 kg/m²   NECK: no thyromegaly, trachea midline  SKIN: no acne, striae, hirsutism  BREAST EXAM: not examined  ABDOMEN: no hernias, masses, or hepatosplenomegaly  GENITALIA: normal external genitalia, no erythema, no discharge  URETHRA: normal urethra, normal urethral meatus  VAGINA: Normal  CERVIX: no lesions or cervical motion tenderness  UTERUS: normal  ADNEXA: normal adnexa      ENDOMETRIAL BIOPSY:    The patient was informed of the risk of bleeding, infection, uterine perforation, and pain.  She was counseled that the test will rule-out endometrial cancer with an accuracy greater than 96%.  She was counseled on the alternatives to endometrial biopsy and agrees to proceed.  A time out was performed.  Cx and vagina prepped with betadine  Anterior lip of  the cervix was grasped with a single tooth tenaculum.  Pap was not done.  A sterile endometrial pipelle was inserted into the uterine cavity.  The uterus sounds to 9cm.  A small of tissue was obtained.  The patient tolerated the procedure moderately.    All tissue was sent to pathology.  No results found for this or any previous visit from the past 365 days.    Lab Results   Component Value Date    WBC 8.41 07/06/2023    HGB 9.1 (L) 07/06/2023    HCT 32.0 (L) 07/06/2023    MCV 69 (L) 07/06/2023     07/06/2023    TSH 2.480 07/06/2023   US PELVIS COMP WITH TRANSVAG NON-OB (XPD)     CLINICAL HISTORY:  Right hydrosalpinx     TECHNIQUE:  Multiple static ultrasound images are submitted for interpretation.     COMPARISON:  09/16/2019     FINDINGS:  The uterus is normal in appearance.  It measures 9.6 cm in craniocaudal dimension by 4.5 cm in AP dimension by 5.1 cm in mediolateral dimension.  The endometrial stripe thickness measures 7 mm.     The right ovary is enlarged.  It measures 6.6 cm x 3.3 cm x 4.9 cm.  It largely consists of a 36 mm cyst.  On the prior examination the cyst measured 45 mm.  There is a fluid-filled tubular structure in the right adnexal region that has a diameter 4 cm.  On the prior examination it had a diameter of 3 cm.     The left ovary was not visualized.  The urinary bladder was not well distended.  There is no significant amount of free fluid visualized within the pelvis.     Impression:     1. There is a fluid-filled tubular structure in the right adnexal region that has a diameter 4 cm.  On the prior examination it had a diameter of 3 cm.  This is consistent with the patient's history and characteristic of hydrosalpinx.  2. The right ovary is enlarged. It measures 6.6 cm x 3.3 cm x 4.9 cm. It largely consists of a 36 mm cyst.  On the prior examination the cyst measured 45 mm.  An enlarged ovary is at increased risk for torsion.  3. The left ovary was not visualized.  4. The uterus is  normal in appearance.        Electronically signed by: Santana Ace MD  Date:                                            05/05/2023  Time:                                           15:16  ASSESSMENT AND PLAN:    1. Menorrhagia with regular cycle  Specimen to Pathology, Ob/Gyn      2. Hydrosalpinx        3. Cyst of right ovary         and plan    Discussed treatment options with patient including OCP's, Mirena IUD, UAE, endometrial ablation, and hysterectomy.  Patient desires Robotic TLH/BSO.

## 2023-08-08 LAB
FINAL PATHOLOGIC DIAGNOSIS: NORMAL
GROSS: NORMAL
Lab: NORMAL

## 2023-08-09 ENCOUNTER — TELEPHONE (OUTPATIENT)
Dept: OBSTETRICS AND GYNECOLOGY | Facility: CLINIC | Age: 48
End: 2023-08-09
Payer: MEDICAID

## 2023-08-10 ENCOUNTER — LAB VISIT (OUTPATIENT)
Dept: LAB | Facility: HOSPITAL | Age: 48
End: 2023-08-10
Attending: OBSTETRICS & GYNECOLOGY
Payer: MEDICAID

## 2023-08-10 DIAGNOSIS — R76.8 BIOLOGICAL FALSE POSITIVE RPR TEST: ICD-10-CM

## 2023-08-10 PROCEDURE — 86780 TREPONEMA PALLIDUM: CPT | Mod: PO | Performed by: OBSTETRICS & GYNECOLOGY

## 2023-08-10 PROCEDURE — 36415 COLL VENOUS BLD VENIPUNCTURE: CPT | Mod: PO | Performed by: OBSTETRICS & GYNECOLOGY

## 2023-08-11 LAB — T PALLIDUM AB SER QL IF: NORMAL

## 2023-08-15 ENCOUNTER — TELEPHONE (OUTPATIENT)
Dept: OBSTETRICS AND GYNECOLOGY | Facility: CLINIC | Age: 48
End: 2023-08-15
Payer: MEDICAID

## 2023-08-15 NOTE — TELEPHONE ENCOUNTER
----- Message from Klaudia Russell sent at 8/11/2023 11:13 AM CDT -----  .Type:  Needs Medical Advice    Who Called: pt    Would the patient rather a call back or a response via [facility]? Call back  Best Call Back Number: [phone]  Additional Information:     Pt would like a call back concerning a surgery she is supposed to have and wants to know if its getting scheduled by the doctor

## 2023-08-16 DIAGNOSIS — N83.201 CYST OF RIGHT OVARY: ICD-10-CM

## 2023-08-16 DIAGNOSIS — N92.0 MENORRHAGIA WITH REGULAR CYCLE: Primary | ICD-10-CM

## 2023-08-16 DIAGNOSIS — N70.11 HYDROSALPINX: ICD-10-CM

## 2023-08-17 ENCOUNTER — ANESTHESIA EVENT (OUTPATIENT)
Dept: SURGERY | Facility: HOSPITAL | Age: 48
End: 2023-08-17
Payer: MEDICAID

## 2023-08-17 ENCOUNTER — TELEPHONE (OUTPATIENT)
Dept: OBSTETRICS AND GYNECOLOGY | Facility: CLINIC | Age: 48
End: 2023-08-17
Payer: MEDICAID

## 2023-08-17 DIAGNOSIS — T88.4XXA HARD TO INTUBATE, INITIAL ENCOUNTER: Primary | ICD-10-CM

## 2023-08-17 NOTE — TELEPHONE ENCOUNTER
----- Message from Tere Higgins sent at 8/16/2023 10:39 AM CDT -----  Type:  Patient Returning Call    Who Called:Pt   Would the patient rather a call back or a response via [facility]? Call back   Best Call Back Number:[phone]  Additional Information: calling to schedule surgery,. Pt says she get off at 3:45 pm ..may not hear phone before that time.. pt work in the plant

## 2023-08-24 ENCOUNTER — HOSPITAL ENCOUNTER (OUTPATIENT)
Dept: CARDIOLOGY | Facility: HOSPITAL | Age: 48
Discharge: HOME | End: 2023-08-24
Attending: OBSTETRICS & GYNECOLOGY
Payer: MEDICAID

## 2023-08-24 DIAGNOSIS — I10 ESSENTIAL HYPERTENSION: Chronic | ICD-10-CM

## 2023-08-24 DIAGNOSIS — Z01.818 PREOP TESTING: ICD-10-CM

## 2023-08-24 PROCEDURE — 93005 ELECTROCARDIOGRAM TRACING: CPT | Mod: PO

## 2023-08-24 PROCEDURE — 93010 EKG 12-LEAD: ICD-10-PCS | Mod: ,,, | Performed by: INTERNAL MEDICINE

## 2023-08-24 PROCEDURE — 93010 ELECTROCARDIOGRAM REPORT: CPT | Mod: ,,, | Performed by: INTERNAL MEDICINE

## 2023-08-28 ENCOUNTER — HOSPITAL ENCOUNTER (OUTPATIENT)
Dept: PREADMISSION TESTING | Facility: HOSPITAL | Age: 48
Discharge: HOME | End: 2023-08-28
Attending: NURSE PRACTITIONER
Payer: MEDICAID

## 2023-08-28 NOTE — ANESTHESIA PREPROCEDURE EVALUATION
08/28/2023  Maru Camacho is a 47 y.o., female for hysterectomy for menorrhagia    No seizures in >10 years  Hx of anesthetics in past without complications  NPO>8 hours  No food or drug allergies  Amenable to proceed with scheduled procedure       Patient Active Problem List   Diagnosis    Essential hypertension    Hypertensive crisis    Microcytic anemia    TIA (transient ischemic attack)    Grade I diastolic dysfunction    Right ovarian cyst       Past Medical History:   Diagnosis Date    Hypertension     Seizures        ECHO: Results for orders placed during the hospital encounter of 07/06/19    Transthoracic echo (TTE) 2D with Color Flow    Interpretation Summary  · Normal left ventricular systolic function. The estimated ejection fraction is 55%  · Grade I (mild) indeterminate left ventricular diastolic function left ventricular diastolic dysfunction consistent with impaired relaxation.  · Mild left atrial enlargement.  · Normal right ventricular systolic function.  · Normal left atrial pressure.  · Mild aortic regurgitation.  · Normal central venous pressure (3 mm Hg).  · The estimated PA systolic pressure is 22 mm Hg  · Negative Bubble Study: No Right to Left Intracardiac shunt      Body mass index is 40.03 kg/m².    Tobacco Use: Low Risk  (9/13/2023)    Patient History     Smoking Tobacco Use: Never     Smokeless Tobacco Use: Never     Passive Exposure: Not on file       Social History     Substance and Sexual Activity   Drug Use Never        Alcohol Use: Not on file       Review of patient's allergies indicates:   Allergen Reactions    Shellfish containing products Hives         Airway:  No value filed.     Pre-op Assessment    I have reviewed the Patient Summary Reports.     I have reviewed the Nursing Notes. I have reviewed the NPO Status.   I have reviewed the Medications.     Review  of Systems  Anesthesia Hx:  No problems with previous Anesthesia  History of prior surgery of interest to airway management or planning: Denies Family Hx of Anesthesia complications.  Personal Hx of Anesthesia complications (seizure while waking up from a D&C over 20 years ago. Was monitored for a couple days then discharged per patient report) Difficult or Failed Neuraxial Anesthesia (associated with epidural for vaginal delivery over 20 years ago)  Social:  Non-Smoker, Social Alcohol Use    Hematology/Oncology:         -- Anemia:   Cardiovascular:   Exercise tolerance: good Denies Pacemaker. Hypertension   Denies Angina.    Pulmonary:  Pulmonary Normal  Denies Shortness of breath.    Renal/:  Renal/ Normal     Hepatic/GI:  Hepatic/GI Normal    Musculoskeletal:  Musculoskeletal Normal    Neurological:   TIA, (2-3 years ago, reports mild intermittent difficulty with short term memory) Seizures (1 episode waking up from a D&C over 20 years ago), well controlled    Endocrine:   Denies Diabetes. Denies Hypothyroidism. Denies Hyperthyroidism.  Morbid Obesity / BMI > 40  Psych:  Psychiatric Normal           Physical Exam  General: Oriented, Cooperative, Well nourished and Alert    Airway:  Mallampati: II / II  Mouth Opening: Small, but > 3cm  TM Distance: Normal  Neck ROM: Normal ROM    Dental:  Intact      Lab Results   Component Value Date    WBC 8.41 07/06/2023    HGB 9.1 (L) 07/06/2023    HCT 32.0 (L) 07/06/2023     07/06/2023    CHOL 150 07/06/2019    TRIG 59 07/06/2019    HDL 50 07/06/2019    ALT 13 07/06/2023    AST 21 07/06/2023     07/06/2023    K 4.0 07/06/2023     07/06/2023    CREATININE 0.67 07/06/2023    BUN 11 07/06/2023    CO2 24 07/06/2023    TSH 2.480 07/06/2023    INR 1.1 07/06/2019    HGBA1C 5.3 07/06/2019     Results for orders placed or performed during the hospital encounter of 08/24/23   EKG 12-lead    Collection Time: 08/24/23  1:53 PM    Narrative    Test Reason :  Z01.818,I10,    Vent. Rate : 073 BPM     Atrial Rate : 073 BPM     P-R Int : 180 ms          QRS Dur : 092 ms      QT Int : 394 ms       P-R-T Axes : 073 056 -21 degrees     QTc Int : 434 ms    Normal sinus rhythm  Biatrial enlargement  Septal infarct ,age undetermined  ST and T wave abnormality, consider inferior ischemia  Abnormal ECG  When compared with ECG of 27-MAR-2021 22:06,  Septal infarct is now Present  Confirmed by Talha HERNANDEZ MD, Familia CONNELLY ([id number]) on 8/25/2023 8:37:31 AM    Referred By: FALLON LOYOLA           Confirmed By:Familia Palencia III, MD       Anesthesia Plan  Type of Anesthesia, risks & benefits discussed:    Anesthesia Type: Gen ETT  Intra-op Monitoring Plan: Standard ASA Monitors  Post Op Pain Control Plan: multimodal analgesia  Induction:  IV  Informed Consent: Informed consent signed with the Patient and all parties understand the risks and agree with anesthesia plan.  All questions answered. Patient consented to blood products? No  ASA Score: 3  Anesthesia Plan Notes: Anesthesia consent to be signed prior to surgery 9/13/23      Ready For Surgery From Anesthesia Perspective.     .

## 2023-08-28 NOTE — DISCHARGE INSTRUCTIONS
Your surgery is scheduled for 9/13/23.    Please report to Hospital Front Lobby on the 1st Floor at 0530 a.m.    THIS TIME IS SUBJECT TO CHANGE.  YOU WILL RECEIVE A PHONE CALL THE DAY BEFORE SURGERY BY 3:30 PM TO CONFIRM YOUR TIME OF ARRIVAL.  IF YOU HAVE NOT RECEIVED A PHONE CALL BY 3:30 PM THE DAY BEFORE YOUR SURGERY PLEASE CALL [phone]     INSTRUCTIONS IMPORTANT!!!  ¨ Do not eat or drink after 12 midnight-including water, candy, gum, & mints. OK to brush teeth.          ____  Do not wear makeup, including mascara.  ____  No powder, lotions or creams to surgical area.  ____  Please remove all jewelry, including piercings and leave at home.  ____  No money or valuables needed. Please leave at home.  ____  Please bring any documents given by your doctor.  ____  If going home the same day, arrange for a ride home. You will not be able to             drive if Anesthesia was used.  ____  Children under 18 years require a parent / guardian present the entire time             they are in surgery / recovery.  ____  Wear loose fitting clothing. Allow for dressings, bandages.  ____  Stop Aspirin, Ibuprofen, Motrin, Aleve, Goody's/BC powders, Excedrine and Naproxen (NSAIDS) at least 3-5 days before surgery, unless otherwise instructed by your doctor, or the nurse.   You MAY use Tylenol/acetaminophen until day of surgery.  ____  Wash the surgical area with Hibiclens or Dial Antibacterial bar soap the night before surgery, and again the             morning of surgery.  Be sure to rinse hibiclens or Dial Antibacterial bar soap off completely (if instructed by   nurse).  ____  If you take diabetic medication including Metformin, Glimepiride, Glipizide, Glyburide, Byetta, Januvia, Actos, do not take am of surgery unless instructed by Doctor.  ____ Hold Invokana, Farxiga, and Jardiance for 3 days prior to surgery.   ____  Call MD for temperature above 101 degrees or any other signs of infection such as Urinary (bladder)  infection, Upper respiratory infection, skin boils, etc.  ____ Stop taking any Fish Oil supplement or any Vitamins that contain Vitamin E at least 5 days prior to surgery.  ____ Do Not wear your contact lenses the day of your procedure.  You may wear your glasses.      ____Do not shave surgical site for 3 days prior to surgery.  ____ Practice Good hand washing before, during, and after procedure.      I have read or had read and explained to me, and understand the above information.  Additional comments or instructions:  For additional questions call [phone]      ANESTHESIA SIDE EFFECTS  -For the first 24 hours after surgery:  Do not drive, use heavy equipment, make important decisions, or drink alcohol  -It is normal to feel sleepy for several hours.  Rest until you are more awake.  -Have someone stay with you, if needed.  They can watch for problems and help keep you safe.  -Some possible post anesthesia side effects include: nausea and vomiting, sore throat and hoarseness, sleepiness, and dizziness.        Pre-Op Bathing Instructions    Before surgery, you can play an important role in your own health.    Because skin is not sterile, we need to be sure that your skin is as free of germs as possible. By following the instructions below, you can reduce the number of germs on your skin before surgery.    IMPORTANT: You will need to shower with a special soap called Hibiclens*, available at any pharmacy.  If you are allergic to Chlorhexidine (the antiseptic in Hibiclens), use an antibacterial soap such as Dial Soap for your preoperative shower.  You will shower with Hibiclens both the night before your surgery and the morning of your surgery.  Do not use Hibiclens on the head, face or genitals to avoid injury to those areas.    STEP #1: THE NIGHT BEFORE YOUR SURGERY     Do not shave the area of your body where your surgery will be performed.  Shower and wash your hair and body as usual with your normal soap and  shampoo.  Rinse your hair and body thoroughly after you shower to remove all soap residue.  With your hand, apply one packet of Hibiclens soap to the surgical site.   Wash the site gently for five (5) minutes. Do not scrub your skin too hard.   Do not wash with your regular soap after Hibiclens is used.  Rinse your body thoroughly.  Pat yourself dry with a clean, soft towel.  Do not use lotion, cream, or powder.  Wear clean clothes.    STEP #2: THE MORNING OF YOUR SURGERY     Repeat Step #1.    * Not to be used by people allergic to Chlorhexidine.

## 2023-08-28 NOTE — PRE-PROCEDURE INSTRUCTIONS
Daughter    Allergies, medical, surgical, family and psychosocial histories reviewed with patient. Periop plan of care reviewed. Preop instructions given, including medications to take and to hold. Hibiclens soap and instructions on use given. Time allotted for questions to be addressed.  Patient verbalized understanding.      Arrival time 0530

## 2023-08-29 ENCOUNTER — HOSPITAL ENCOUNTER (OUTPATIENT)
Dept: CARDIOLOGY | Facility: HOSPITAL | Age: 48
Discharge: HOME | End: 2023-08-29
Payer: MEDICAID

## 2023-08-29 DIAGNOSIS — R94.31 EKG, ABNORMAL: ICD-10-CM

## 2023-08-29 DIAGNOSIS — I10 ESSENTIAL HYPERTENSION: Chronic | ICD-10-CM

## 2023-08-29 DIAGNOSIS — Z01.818 PREOP TESTING: ICD-10-CM

## 2023-08-29 PROCEDURE — 93010 EKG 12-LEAD: ICD-10-PCS | Mod: ,,, | Performed by: INTERNAL MEDICINE

## 2023-08-29 PROCEDURE — 93005 ELECTROCARDIOGRAM TRACING: CPT | Mod: PO

## 2023-08-29 PROCEDURE — 93010 ELECTROCARDIOGRAM REPORT: CPT | Mod: ,,, | Performed by: INTERNAL MEDICINE

## 2023-09-12 NOTE — H&P
"CC:menorrhagia, rt hydrosalpinx      Chief Complaint   Patient presents with    Follow-up         HPI:     47 y.o.   OB History            4    Para   2    Term   1       1    AB   2    Living   2           SAB   1    IAB        Ectopic   1    Multiple        Live Births   2                 Complaining of: 5 day cycles, heavy on day 2, passes clots  Also has rt hydrosalpinx and pain      (Not in a hospital admission)             Review of patient's allergies indicates:   Allergen Reactions    Shellfish containing products Hives              Past Medical History:   Diagnosis Date    Hypertension      Seizures              Past Surgical History:   Procedure Laterality Date    ADENOIDECTOMY        DILATION AND CURETTAGE OF UTERUS          No family history on file.  Social History           Tobacco Use    Smoking status: Never    Smokeless tobacco: Never   Substance Use Topics    Alcohol use: No    Drug use: Never      ROS:  GENERAL: Feeling well overall. Denies fever or chills.   SKIN: Denies rash or lesions.   HEAD: Denies head injury or headache.   NODES: Denies enlarged lymph nodes.   CHEST: Denies chest pain or shortness of breath.   CARDIOVASCULAR: Denies palpitations or left sided chest pain.    ABDOMEN: Denies diarrhea, nausea, vomiting or rectal bleeding.   URINARY: No dysuria, hematuria, or burning on urination.  REPRODUCTIVE: See HPI.   BREASTS: Denies pain, lumps, or nipple discharge.   HEMATOLOGIC: No easy bruisability or excessive bleeding.   MUSCULOSKELETAL: Denies joint pain or swelling.   NEUROLOGIC: Denies syncope or weakness.   PSYCHIATRIC: Denies depression, anxiety or mood swings.        PE: BP (!) 200/15   Ht 5' 3" (1.6 m)   Wt 104.4 kg (230 lb 2.6 oz)   BMI 40.77 kg/m²       APPEARANCE: Well nourished, well developed, in no acute distress.  SKIN: Normal skin turgor, no lesions.  NECK: Neck symmetric without masses or thyromegaly.  NODES: No inguinal, cervical, axillary or femoral " lymph node enlargement.  CARDIOVASCULAR: Normal S1, S2. No rubs, murmurs or gallops.  NEUROLOGIC: Normal mood and affect. No depression or anxiety.   ABDOMEN: Soft. No tenderness or masses. No hepatosplenomegaly. No hernias.  RESPIRATORY: Normal respiratory effort with no retractions or use of accessory muscles.     CLINICAL HISTORY:  Right hydrosalpinx     TECHNIQUE:  Multiple static ultrasound images are submitted for interpretation.     COMPARISON:  09/16/2019     FINDINGS:  The uterus is normal in appearance.  It measures 9.6 cm in craniocaudal dimension by 4.5 cm in AP dimension by 5.1 cm in mediolateral dimension.  The endometrial stripe thickness measures 7 mm.     The right ovary is enlarged.  It measures 6.6 cm x 3.3 cm x 4.9 cm.  It largely consists of a 36 mm cyst.  On the prior examination the cyst measured 45 mm.  There is a fluid-filled tubular structure in the right adnexal region that has a diameter 4 cm.  On the prior examination it had a diameter of 3 cm.     The left ovary was not visualized.  The urinary bladder was not well distended.  There is no significant amount of free fluid visualized within the pelvis.     Impression:     1. There is a fluid-filled tubular structure in the right adnexal region that has a diameter 4 cm.  On the prior examination it had a diameter of 3 cm.  This is consistent with the patient's history and characteristic of hydrosalpinx.  2. The right ovary is enlarged. It measures 6.6 cm x 3.3 cm x 4.9 cm. It largely consists of a 36 mm cyst.  On the prior examination the cyst measured 45 mm.  An enlarged ovary is at increased risk for torsion.  3. The left ovary was not visualized.  4. The uterus is normal in appearance.        Electronically signed by: Santana Ace MD  Date:                                            05/05/2023  Time:                                           15:16  ASSESSMENT/ PLAN     Maru was seen today for follow-up.     Diagnoses and all orders  for this visit:     Menorrhagia with regular cycle  -     CBC Auto Differential; Future  -     Follicle Stimulating Hormone; Future  -     TSH; Future  -     Comprehensive metabolic panel; Future     Right ovarian cyst     Hydrosalpinx     Essential hypertension           Control BP  F/u for embx after BP controlled  Will schedule robotic TLH/BS poss ovarian cystectomy  Feosol bid  Nsaid sfor pain control     Griffin Dailey MD

## 2023-09-13 ENCOUNTER — ANESTHESIA (OUTPATIENT)
Dept: SURGERY | Facility: HOSPITAL | Age: 48
End: 2023-09-13
Payer: MEDICAID

## 2023-09-13 ENCOUNTER — HOSPITAL ENCOUNTER (OUTPATIENT)
Facility: HOSPITAL | Age: 48
Discharge: HOME | End: 2023-09-13
Attending: OBSTETRICS & GYNECOLOGY | Admitting: OBSTETRICS & GYNECOLOGY
Payer: MEDICAID

## 2023-09-13 VITALS
HEART RATE: 71 BPM | BODY MASS INDEX: 40.04 KG/M2 | TEMPERATURE: 98 F | WEIGHT: 226 LBS | RESPIRATION RATE: 15 BRPM | OXYGEN SATURATION: 98 % | DIASTOLIC BLOOD PRESSURE: 71 MMHG | SYSTOLIC BLOOD PRESSURE: 128 MMHG | HEIGHT: 63 IN

## 2023-09-13 DIAGNOSIS — N83.201 RIGHT OVARIAN CYST: ICD-10-CM

## 2023-09-13 DIAGNOSIS — R94.31 EKG, ABNORMAL: ICD-10-CM

## 2023-09-13 DIAGNOSIS — I10 ESSENTIAL HYPERTENSION: Chronic | ICD-10-CM

## 2023-09-13 DIAGNOSIS — Z01.818 PREOP TESTING: Primary | ICD-10-CM

## 2023-09-13 DIAGNOSIS — N92.0 MENORRHAGIA WITH REGULAR CYCLE: ICD-10-CM

## 2023-09-13 PROBLEM — T88.4XXA HARD TO INTUBATE: Status: ACTIVE | Noted: 2023-09-13

## 2023-09-13 LAB
ABO + RH BLD: NORMAL
B-HCG UR QL: NEGATIVE
BLD GP AB SCN CELLS X3 SERPL QL: NORMAL
CTP QC/QA: YES
POCT GLUCOSE: 104 MG/DL (ref 70–110)
SPECIMEN OUTDATE: NORMAL

## 2023-09-13 PROCEDURE — P9045 ALBUMIN (HUMAN), 5%, 250 ML: HCPCS | Mod: JZ,JG | Performed by: NURSE ANESTHETIST, CERTIFIED REGISTERED

## 2023-09-13 PROCEDURE — D9220A PRA ANESTHESIA: Mod: CRNA,,, | Performed by: NURSE ANESTHETIST, CERTIFIED REGISTERED

## 2023-09-13 PROCEDURE — 88307 PR  SURG PATH,LEVEL V: ICD-10-PCS | Mod: 26,,, | Performed by: PATHOLOGY

## 2023-09-13 PROCEDURE — 36000713 HC OR TIME LEV V EA ADD 15 MIN: Performed by: OBSTETRICS & GYNECOLOGY

## 2023-09-13 PROCEDURE — D9220A PRA ANESTHESIA: ICD-10-PCS | Mod: ANES,,, | Performed by: UROLOGY

## 2023-09-13 PROCEDURE — 63600175 PHARM REV CODE 636 W HCPCS: Performed by: UROLOGY

## 2023-09-13 PROCEDURE — 63600175 PHARM REV CODE 636 W HCPCS: Performed by: NURSE ANESTHETIST, CERTIFIED REGISTERED

## 2023-09-13 PROCEDURE — 36000712 HC OR TIME LEV V 1ST 15 MIN: Performed by: OBSTETRICS & GYNECOLOGY

## 2023-09-13 PROCEDURE — 58571 PR LAPAROSCOPY W TOT HYSTERECTUTERUS <=250 GRAM  W TUBE/OVARY: ICD-10-PCS | Mod: ,,, | Performed by: OBSTETRICS & GYNECOLOGY

## 2023-09-13 PROCEDURE — 63600175 PHARM REV CODE 636 W HCPCS: Performed by: OBSTETRICS & GYNECOLOGY

## 2023-09-13 PROCEDURE — 71000039 HC RECOVERY, EACH ADD'L HOUR: Performed by: OBSTETRICS & GYNECOLOGY

## 2023-09-13 PROCEDURE — 37000009 HC ANESTHESIA EA ADD 15 MINS: Performed by: OBSTETRICS & GYNECOLOGY

## 2023-09-13 PROCEDURE — 63600175 PHARM REV CODE 636 W HCPCS: Performed by: NURSE PRACTITIONER

## 2023-09-13 PROCEDURE — 36415 COLL VENOUS BLD VENIPUNCTURE: CPT | Performed by: OBSTETRICS & GYNECOLOGY

## 2023-09-13 PROCEDURE — 27201423 OPTIME MED/SURG SUP & DEVICES STERILE SUPPLY: Performed by: OBSTETRICS & GYNECOLOGY

## 2023-09-13 PROCEDURE — 25000003 PHARM REV CODE 250

## 2023-09-13 PROCEDURE — 25000003 PHARM REV CODE 250: Performed by: OBSTETRICS & GYNECOLOGY

## 2023-09-13 PROCEDURE — D9220A PRA ANESTHESIA: ICD-10-PCS | Mod: CRNA,,, | Performed by: NURSE ANESTHETIST, CERTIFIED REGISTERED

## 2023-09-13 PROCEDURE — 37000008 HC ANESTHESIA 1ST 15 MINUTES: Performed by: OBSTETRICS & GYNECOLOGY

## 2023-09-13 PROCEDURE — 58571 TLH W/T/O 250 G OR LESS: CPT | Mod: ,,, | Performed by: OBSTETRICS & GYNECOLOGY

## 2023-09-13 PROCEDURE — 71000016 HC POSTOP RECOV ADDL HR: Performed by: OBSTETRICS & GYNECOLOGY

## 2023-09-13 PROCEDURE — 88307 TISSUE EXAM BY PATHOLOGIST: CPT | Performed by: PATHOLOGY

## 2023-09-13 PROCEDURE — 71000033 HC RECOVERY, INTIAL HOUR: Performed by: OBSTETRICS & GYNECOLOGY

## 2023-09-13 PROCEDURE — 25000003 PHARM REV CODE 250: Performed by: NURSE ANESTHETIST, CERTIFIED REGISTERED

## 2023-09-13 PROCEDURE — 71000015 HC POSTOP RECOV 1ST HR: Performed by: OBSTETRICS & GYNECOLOGY

## 2023-09-13 PROCEDURE — 81025 URINE PREGNANCY TEST: CPT | Performed by: OBSTETRICS & GYNECOLOGY

## 2023-09-13 PROCEDURE — D9220A PRA ANESTHESIA: Mod: ANES,,, | Performed by: UROLOGY

## 2023-09-13 PROCEDURE — 86850 RBC ANTIBODY SCREEN: CPT | Performed by: OBSTETRICS & GYNECOLOGY

## 2023-09-13 PROCEDURE — 88307 TISSUE EXAM BY PATHOLOGIST: CPT | Mod: 26,,, | Performed by: PATHOLOGY

## 2023-09-13 RX ORDER — DEXAMETHASONE SODIUM PHOSPHATE 4 MG/ML
INJECTION, SOLUTION INTRA-ARTICULAR; INTRALESIONAL; INTRAMUSCULAR; INTRAVENOUS; SOFT TISSUE
Status: DISCONTINUED | OUTPATIENT
Start: 2023-09-13 | End: 2023-09-13

## 2023-09-13 RX ORDER — IBUPROFEN 600 MG/1
600 TABLET ORAL 3 TIMES DAILY
Qty: 40 TABLET | Refills: 1 | Status: SHIPPED | OUTPATIENT
Start: 2023-09-13 | End: 2023-09-15

## 2023-09-13 RX ORDER — MUPIROCIN 20 MG/G
OINTMENT TOPICAL
Status: DISCONTINUED | OUTPATIENT
Start: 2023-09-13 | End: 2023-09-13 | Stop reason: HOSPADM

## 2023-09-13 RX ORDER — KETOROLAC TROMETHAMINE 30 MG/ML
INJECTION, SOLUTION INTRAMUSCULAR; INTRAVENOUS
Status: DISCONTINUED | OUTPATIENT
Start: 2023-09-13 | End: 2023-09-13

## 2023-09-13 RX ORDER — BUPIVACAINE HYDROCHLORIDE 2.5 MG/ML
INJECTION, SOLUTION INFILTRATION; PERINEURAL
Status: DISCONTINUED | OUTPATIENT
Start: 2023-09-13 | End: 2023-09-13 | Stop reason: HOSPADM

## 2023-09-13 RX ORDER — ONDANSETRON 2 MG/ML
4 INJECTION INTRAMUSCULAR; INTRAVENOUS DAILY PRN
Status: DISCONTINUED | OUTPATIENT
Start: 2023-09-13 | End: 2023-09-13 | Stop reason: HOSPADM

## 2023-09-13 RX ORDER — OXYCODONE HYDROCHLORIDE 5 MG/1
10 TABLET ORAL EVERY 4 HOURS PRN
Status: DISCONTINUED | OUTPATIENT
Start: 2023-09-13 | End: 2023-09-13 | Stop reason: HOSPADM

## 2023-09-13 RX ORDER — PROCHLORPERAZINE EDISYLATE 5 MG/ML
5 INJECTION INTRAMUSCULAR; INTRAVENOUS EVERY 6 HOURS PRN
Status: DISCONTINUED | OUTPATIENT
Start: 2023-09-13 | End: 2023-09-13 | Stop reason: HOSPADM

## 2023-09-13 RX ORDER — ACETAMINOPHEN 10 MG/ML
INJECTION, SOLUTION INTRAVENOUS
Status: DISCONTINUED | OUTPATIENT
Start: 2023-09-13 | End: 2023-09-13

## 2023-09-13 RX ORDER — DIPHENHYDRAMINE HYDROCHLORIDE 50 MG/ML
25 INJECTION INTRAMUSCULAR; INTRAVENOUS EVERY 4 HOURS PRN
Status: DISCONTINUED | OUTPATIENT
Start: 2023-09-13 | End: 2023-09-13 | Stop reason: HOSPADM

## 2023-09-13 RX ORDER — SODIUM CHLORIDE, SODIUM LACTATE, POTASSIUM CHLORIDE, CALCIUM CHLORIDE 600; 310; 30; 20 MG/100ML; MG/100ML; MG/100ML; MG/100ML
INJECTION, SOLUTION INTRAVENOUS CONTINUOUS
Status: ACTIVE | OUTPATIENT
Start: 2023-09-13

## 2023-09-13 RX ORDER — LIDOCAINE HYDROCHLORIDE 10 MG/ML
1 INJECTION, SOLUTION EPIDURAL; INFILTRATION; INTRACAUDAL; PERINEURAL ONCE
Status: DISCONTINUED | OUTPATIENT
Start: 2023-09-13 | End: 2023-09-13 | Stop reason: HOSPADM

## 2023-09-13 RX ORDER — OXYCODONE AND ACETAMINOPHEN 5; 325 MG/1; MG/1
1 TABLET ORAL EVERY 4 HOURS PRN
Qty: 30 TABLET | Refills: 0 | Status: SHIPPED | OUTPATIENT
Start: 2023-09-13

## 2023-09-13 RX ORDER — ONDANSETRON 8 MG/1
8 TABLET, ORALLY DISINTEGRATING ORAL EVERY 8 HOURS PRN
Status: DISCONTINUED | OUTPATIENT
Start: 2023-09-13 | End: 2023-09-13 | Stop reason: HOSPADM

## 2023-09-13 RX ORDER — EPHEDRINE SULFATE 50 MG/ML
INJECTION, SOLUTION INTRAVENOUS
Status: DISCONTINUED | OUTPATIENT
Start: 2023-09-13 | End: 2023-09-13

## 2023-09-13 RX ORDER — LIDOCAINE HYDROCHLORIDE 10 MG/ML
1 INJECTION, SOLUTION EPIDURAL; INFILTRATION; INTRACAUDAL; PERINEURAL ONCE
Status: ACTIVE | OUTPATIENT
Start: 2023-09-13

## 2023-09-13 RX ORDER — OXYCODONE HYDROCHLORIDE 5 MG/1
5 TABLET ORAL EVERY 4 HOURS PRN
Status: DISCONTINUED | OUTPATIENT
Start: 2023-09-13 | End: 2023-09-13 | Stop reason: HOSPADM

## 2023-09-13 RX ORDER — HYDROMORPHONE HYDROCHLORIDE 2 MG/ML
0.2 INJECTION, SOLUTION INTRAMUSCULAR; INTRAVENOUS; SUBCUTANEOUS EVERY 5 MIN PRN
Status: DISCONTINUED | OUTPATIENT
Start: 2023-09-13 | End: 2023-09-13 | Stop reason: HOSPADM

## 2023-09-13 RX ORDER — LIDOCAINE HYDROCHLORIDE 20 MG/ML
INJECTION INTRAVENOUS
Status: DISCONTINUED | OUTPATIENT
Start: 2023-09-13 | End: 2023-09-13

## 2023-09-13 RX ORDER — MEPERIDINE HYDROCHLORIDE 50 MG/ML
12.5 INJECTION INTRAMUSCULAR; INTRAVENOUS; SUBCUTANEOUS ONCE AS NEEDED
Status: DISCONTINUED | OUTPATIENT
Start: 2023-09-13 | End: 2023-09-13 | Stop reason: HOSPADM

## 2023-09-13 RX ORDER — DIPHENHYDRAMINE HCL 25 MG
25 CAPSULE ORAL EVERY 4 HOURS PRN
Status: DISCONTINUED | OUTPATIENT
Start: 2023-09-13 | End: 2023-09-13 | Stop reason: HOSPADM

## 2023-09-13 RX ORDER — MIDAZOLAM HYDROCHLORIDE 1 MG/ML
INJECTION INTRAMUSCULAR; INTRAVENOUS
Status: DISCONTINUED | OUTPATIENT
Start: 2023-09-13 | End: 2023-09-13

## 2023-09-13 RX ORDER — FENTANYL CITRATE 50 UG/ML
INJECTION, SOLUTION INTRAMUSCULAR; INTRAVENOUS
Status: DISCONTINUED | OUTPATIENT
Start: 2023-09-13 | End: 2023-09-13

## 2023-09-13 RX ORDER — ALBUMIN HUMAN 50 G/1000ML
SOLUTION INTRAVENOUS CONTINUOUS PRN
Status: DISCONTINUED | OUTPATIENT
Start: 2023-09-13 | End: 2023-09-13

## 2023-09-13 RX ORDER — OXYCODONE AND ACETAMINOPHEN 5; 325 MG/1; MG/1
1 TABLET ORAL
Status: DISCONTINUED | OUTPATIENT
Start: 2023-09-13 | End: 2023-09-13 | Stop reason: HOSPADM

## 2023-09-13 RX ORDER — PHENYLEPHRINE HYDROCHLORIDE 10 MG/ML
INJECTION INTRAVENOUS
Status: DISCONTINUED | OUTPATIENT
Start: 2023-09-13 | End: 2023-09-13

## 2023-09-13 RX ORDER — NEOSTIGMINE METHYLSULFATE 1 MG/ML
INJECTION, SOLUTION INTRAVENOUS
Status: DISCONTINUED | OUTPATIENT
Start: 2023-09-13 | End: 2023-09-13

## 2023-09-13 RX ORDER — IBUPROFEN 600 MG/1
600 TABLET ORAL EVERY 6 HOURS PRN
Status: DISCONTINUED | OUTPATIENT
Start: 2023-09-13 | End: 2023-09-13 | Stop reason: HOSPADM

## 2023-09-13 RX ORDER — ROCURONIUM BROMIDE 10 MG/ML
INJECTION, SOLUTION INTRAVENOUS
Status: DISCONTINUED | OUTPATIENT
Start: 2023-09-13 | End: 2023-09-13

## 2023-09-13 RX ORDER — PROPOFOL 10 MG/ML
VIAL (ML) INTRAVENOUS
Status: DISCONTINUED | OUTPATIENT
Start: 2023-09-13 | End: 2023-09-13

## 2023-09-13 RX ORDER — PROCHLORPERAZINE EDISYLATE 5 MG/ML
5 INJECTION INTRAMUSCULAR; INTRAVENOUS EVERY 30 MIN PRN
Status: DISCONTINUED | OUTPATIENT
Start: 2023-09-13 | End: 2023-09-13 | Stop reason: HOSPADM

## 2023-09-13 RX ORDER — CEFAZOLIN SODIUM 2 G/50ML
2 SOLUTION INTRAVENOUS
Status: COMPLETED | OUTPATIENT
Start: 2023-09-13 | End: 2023-09-13

## 2023-09-13 RX ORDER — ONDANSETRON 2 MG/ML
INJECTION INTRAMUSCULAR; INTRAVENOUS
Status: DISCONTINUED | OUTPATIENT
Start: 2023-09-13 | End: 2023-09-13

## 2023-09-13 RX ORDER — KETAMINE HCL IN 0.9 % NACL 50 MG/5 ML
SYRINGE (ML) INTRAVENOUS
Status: DISCONTINUED | OUTPATIENT
Start: 2023-09-13 | End: 2023-09-13

## 2023-09-13 RX ADMIN — PROPOFOL 200 MG: 10 INJECTION, EMULSION INTRAVENOUS at 08:09

## 2023-09-13 RX ADMIN — CEFAZOLIN SODIUM 2 G: 2 SOLUTION INTRAVENOUS at 08:09

## 2023-09-13 RX ADMIN — GLYCOPYRROLATE 0.6 MG: 0.2 INJECTION, SOLUTION INTRAMUSCULAR; INTRAVITREAL at 10:09

## 2023-09-13 RX ADMIN — PHENYLEPHRINE HYDROCHLORIDE 200 MCG: 10 INJECTION INTRAVENOUS at 10:09

## 2023-09-13 RX ADMIN — PROPOFOL 50 MG: 10 INJECTION, EMULSION INTRAVENOUS at 08:09

## 2023-09-13 RX ADMIN — LIDOCAINE HYDROCHLORIDE 100 MG: 20 INJECTION, SOLUTION INTRAVENOUS at 08:09

## 2023-09-13 RX ADMIN — PHENYLEPHRINE HYDROCHLORIDE 100 MCG: 10 INJECTION INTRAVENOUS at 08:09

## 2023-09-13 RX ADMIN — HYDROMORPHONE HYDROCHLORIDE 0.2 MG: 2 INJECTION, SOLUTION INTRAMUSCULAR; INTRAVENOUS; SUBCUTANEOUS at 11:09

## 2023-09-13 RX ADMIN — Medication 25 MG: at 08:09

## 2023-09-13 RX ADMIN — ONDANSETRON 8 MG: 2 INJECTION, SOLUTION INTRAMUSCULAR; INTRAVENOUS at 10:09

## 2023-09-13 RX ADMIN — NEOSTIGMINE METHYLSULFATE 5 MG: 1 INJECTION INTRAVENOUS at 10:09

## 2023-09-13 RX ADMIN — FENTANYL CITRATE 100 MCG: 50 INJECTION, SOLUTION INTRAMUSCULAR; INTRAVENOUS at 08:09

## 2023-09-13 RX ADMIN — GLYCOPYRROLATE 0.2 MG: 0.2 INJECTION, SOLUTION INTRAMUSCULAR; INTRAVITREAL at 08:09

## 2023-09-13 RX ADMIN — MIDAZOLAM HYDROCHLORIDE 2 MG: 1 INJECTION, SOLUTION INTRAMUSCULAR; INTRAVENOUS at 07:09

## 2023-09-13 RX ADMIN — PHENYLEPHRINE HYDROCHLORIDE 100 MCG: 10 INJECTION INTRAVENOUS at 10:09

## 2023-09-13 RX ADMIN — OXYCODONE HYDROCHLORIDE 10 MG: 5 TABLET ORAL at 11:09

## 2023-09-13 RX ADMIN — ACETAMINOPHEN 1000 MG: 10 INJECTION, SOLUTION INTRAVENOUS at 08:09

## 2023-09-13 RX ADMIN — DEXAMETHASONE SODIUM PHOSPHATE 8 MG: 4 INJECTION, SOLUTION INTRA-ARTICULAR; INTRALESIONAL; INTRAMUSCULAR; INTRAVENOUS; SOFT TISSUE at 08:09

## 2023-09-13 RX ADMIN — SODIUM CHLORIDE, SODIUM LACTATE, POTASSIUM CHLORIDE, AND CALCIUM CHLORIDE: .6; .31; .03; .02 INJECTION, SOLUTION INTRAVENOUS at 08:09

## 2023-09-13 RX ADMIN — ALBUMIN (HUMAN): 12.5 SOLUTION INTRAVENOUS at 09:09

## 2023-09-13 RX ADMIN — ROCURONIUM BROMIDE 50 MG: 10 INJECTION, SOLUTION INTRAVENOUS at 08:09

## 2023-09-13 RX ADMIN — EPHEDRINE SULFATE 10 MG: 50 INJECTION, SOLUTION INTRAMUSCULAR; INTRAVENOUS; SUBCUTANEOUS at 08:09

## 2023-09-13 RX ADMIN — KETOROLAC TROMETHAMINE 30 MG: 30 INJECTION, SOLUTION INTRAMUSCULAR; INTRAVENOUS at 10:09

## 2023-09-13 RX ADMIN — PHENYLEPHRINE HYDROCHLORIDE 200 MCG: 10 INJECTION INTRAVENOUS at 08:09

## 2023-09-13 NOTE — INTERVAL H&P NOTE
The patient has been examined and the H&P has been reviewed:    I concur with the findings and no changes have occurred since H&P was written.    Surgery risks, benefits and alternative options discussed and understood by patient/family.    Robotic TLH/BS possible Ro or cystectomy      There are no hospital problems to display for this patient.

## 2023-09-13 NOTE — ANESTHESIA POSTPROCEDURE EVALUATION
Anesthesia Post Evaluation    Patient: Maru Camacho    Procedure(s) Performed: Procedure(s) (LRB):  ROBOTIC HYSTERECTOMY (N/A)  ROBOTIC SALPINGO-OOPHORECTOMY (Bilateral)    Final Anesthesia Type: general      Patient location during evaluation: PACU  Patient participation: Yes- Able to Participate  Level of consciousness: awake and alert and oriented  Post-procedure vital signs: reviewed and stable  Pain management: adequate  Airway patency: patent    PONV status at discharge: No PONV  Anesthetic complications: no      Cardiovascular status: hemodynamically stable  Respiratory status: unassisted  Hydration status: euvolemic  Follow-up not needed.          Vitals Value Taken Time   /72 09/13/23 1126   Temp 36.5 °C (97.7 °F) 09/13/23 1055   Pulse 92 09/13/23 1126   Resp 24 09/13/23 1126   SpO2 92 % 09/13/23 1126   Vitals shown include unvalidated device data.      No case tracking events are documented in the log.      Pain/Saskia Score: Saskia Score: 8 (9/13/2023 11:15 AM)

## 2023-09-13 NOTE — TRANSFER OF CARE
"Anesthesia Transfer of Care Note    Patient: Maru Camacho    Procedure(s) Performed: Procedure(s) (LRB):  ROBOTIC HYSTERECTOMY (N/A)  ROBOTIC SALPINGO-OOPHORECTOMY (Bilateral)    Patient location: PACU    Anesthesia Type: general    Transport from OR: Transported from OR on 6-10 L/min O2 by face mask with adequate spontaneous ventilation    Post pain: adequate analgesia    Post assessment: no apparent anesthetic complications    Post vital signs: stable    Level of consciousness: awake    Nausea/Vomiting: no nausea/vomiting    Complications: none    Transfer of care protocol was followed      Last vitals:   Visit Vitals  BP (!) 189/94 (BP Location: Right arm, Patient Position: Sitting)   Pulse 77   Temp 37.1 °C (98.8 °F) (Temporal)   Resp 16   Ht 5' 3" (1.6 m)   Wt 102.5 kg (226 lb)   SpO2 98%   Breastfeeding No   BMI 40.03 kg/m²     "

## 2023-09-13 NOTE — ANESTHESIA PROCEDURE NOTES
Intubation    Date/Time: 9/13/2023 8:20 AM    Performed by: Nela Knox CRNA  Authorized by: Gregory Saravia MD    Intubation:     Induction:  Intravenous    Intubated:  Postinduction    Mask Ventilation:  Moderately difficult with oral airway    Attempts:  3    Attempted By:  CRNA and student    Method of Intubation:  Video laryngoscopy    Blade:  Bhagat 3    Laryngeal View Grade: Grade III - only epiglottis visible      Attempted By (2nd Attempt):  Student    Method of Intubation (2nd Attempt):  Video laryngoscopy    Blade (2nd Attempt):  Bhagat 3    Laryngeal View Grade (2nd Attempt): Grade IIb - only the arytenoids and epiglottis seen      Attempted By (3rd Attempt):  CRNA    Method of Intubation (3rd Attempt):  Video laryngoscopy    Blade (3rd Attempt):  Bhagat 3    Laryngeal View Grade (3rd Attempt): Grade I - full view of cords      Difficult Airway Encountered?: Yes      Future Airway Recommendations:  GLidescope    Complications:  None    Airway Device:  Oral endotracheal tube    Airway Device Size:  7.5    Style/Cuff Inflation:  Cuffed    Tube secured:  22    Secured at:  The lips    Placement Verified By:  Capnometry, Colorimetric ETCO2 device and Revisualization with laryngoscopy    Complicating Factors:  Anterior larynx, short neck, poor neck/head extension and oropharyngeal edema or fat    Findings Post-Intubation:  BS equal bilateral

## 2023-09-13 NOTE — PLAN OF CARE
VSS  NAD  Discharge instructions given with claimed understanding by pt and family. Patient has ride home with family or friend. Claims pain level is ___3___ at this time.  Has voided without difficulty if required by surgical type.

## 2023-09-13 NOTE — OP NOTE
DATE OF PROCEDURE:  9/13/23     PREOPERATIVE DIAGNOSIS:    Menorrhagia, rt ovarian cyst and hydrosalpinx     POSTOPERATIVE DIAGNOSIS:    Same as preop     SURGERY:  Robotic TLH/BS at rt oopherectomy     SURGEON:  Griffin Dailey M.D.     ASSISTANT:  Nidhi Chacon     ANESTHESIA:  General endotracheal tube anesthesia.     ESTIMATED BLOOD LOSS:  20 mL     FINDINGS:  dilated bilateral tubes and nml left ovaries and normal uterus.extensive adhesion of colon to post uterus     SPECIMENS:  Uterus and cervix. Bilateral tubes , rt ovary  COMPLICATIONS:  None.     OPERATIVE REPORT IN DETAIL:  After assuring informed consent, the patient was   taken to the Operating Room where general anesthesia was administered.  She was   then placed in lithotomy position.  Her vagina was prepped and then her abdomen   was prepped.  The patient was then draped.  A weighted speculum was placed in   the vagina.  The anterior lip of the cervix was grasped with a single-tooth   tenaculum and theRumi uterine manipulator was placed into the endocervix and   the endocervical balloon was inflated.  The vaginal cuff occluder was then   insufflated.  Once this was done, the single-tooth tenaculum had been removed.  The   Rbight catheter was then placed.Legs were lowered and bed leveled and then  Attention was then turned to the abdomen and an   Supraumbilical incision was made with the scalpel while tenting up on the   abdomen, a Veress needle was inserted.  A saline drop test was noted to be   within normal limits.  An 8 mm trocar then was placed into the supraumbilical   incision site and laparoscopic confirmation and location was achieved.  The   trocars were inserted while tenting up on the abdomen.  Once this was done, 8 mm   trocars were placed lateral of midline trocar in the right and left lower quadrants under laparoscopic visualization without complication. Remote centers were set and the robot was docked and tension released from trocar  sites. Omental adhesions were lysed off ant abd wall with endoshears. Attention was then turned to the uterus   with the findings noted above.  colon adhesions were lysed off post uterus. The Tubes across the meso salpinx to the uteroovarian ligament was then cauterized   and transected using the Vessel sealer device and then the round ligament was   cauterized and transected using the Vessel sealler device.  The bladder flap was then   developed.  The same procedure was performed on the opposite side.  the rt IP ligament was cauterized and transected with vessel sealer as no nml rt ovary was present.Once the   bladder was well down in the uterine arteries had been cauterized and transected   using the Vessel sealer device, then using the laparoscopic scissors, the anterior colpotomy incision was made and the  cup was identified. The scissors were used until the entire colpotomy incision was made until the cervix was disconnected from the vaginal cuff. The uterus was then pulled into the vagina to use as a vaginal cuff occluder. Then using 2- 0 V-Lock suture, the vaginal cuff angle on the right was obtained and then several running stitches  were placed into the vaginal cuff untilThe left vaginal cuff angle was closed then the suture locked by running back to the right midline of cuff.. The area was very hemostatic. The area was copiously irrigated.  No bleeding was noted.    Pneumoperitoneum was released.  No bleeding was noted. . The patient tolerated the procedure well.  Pneumoperitoneum was released, The skin was closed using 4-0 Vicrylcryl suture in a subcuticular fashion.    The patient tolerated the procedure   well.  All counts were correct x2. Vaginal sweep performed and cuff intact and dry. The patient was taken to Recovery Room in   stable condition.    [facility]    Discharge Note    SUMMARY     Admit Date: 10/1/2019    Discharge Date and Time:  09/13/2023 10:46 AM    Hospital Course  46 y/o with hydrosalpinx and ovarian cyst and menorrhagia admitted for Robotic TLH/LS/RSO and KHADAR. Procedure performed w/o complications and pt was d/c'd home on same day.    Final Diagnosis: Post-Op Diagnosis Codes:  Pelvic adhesion, rt ovarian cyst, rt hydosalpinx    Disposition: Home or Self Care    Follow Up/Patient Instructions:     Medications:  Reconciled Home Medications:      Medication List        START taking these medications      ibuprofen 600 MG tablet  Commonly known as: ADVIL,MOTRIN  Take 1 tablet (600 mg total) by mouth 3 (three) times daily.     oxyCODONE-acetaminophen 5-325 mg per tablet  Commonly known as: PERCOCET  Take 1 tablet by mouth every 4 (four) hours as needed for Pain.            CONTINUE taking these medications      amLODIPine 10 MG tablet  Commonly known as: NORVASC  Take 10 mg by mouth once daily.     losartan 100 MG tablet  Commonly known as: COZAAR  Take 100 mg by mouth.            D/C pt. To home in stable condition  Reg diet  Ad vadim activity with pelvic rest x 6 wks  Call for fever >101, heavy vag bleeding, pain uncontrolled w/ pain meds  F/u in office in 1 week and  6-8wks for final check-up  Motrin 600mg, Percocet 5/325mg  Griffin Dailey MD

## 2023-09-15 ENCOUNTER — TELEPHONE (OUTPATIENT)
Dept: OBSTETRICS AND GYNECOLOGY | Facility: CLINIC | Age: 48
End: 2023-09-15
Payer: MEDICAID

## 2023-09-15 ENCOUNTER — NURSE TRIAGE (OUTPATIENT)
Dept: ADMINISTRATIVE | Facility: CLINIC | Age: 48
End: 2023-09-15
Payer: MEDICAID

## 2023-09-15 RX ORDER — IBUPROFEN 800 MG/1
800 TABLET ORAL EVERY 8 HOURS PRN
Qty: 60 TABLET | Refills: 2 | Status: SHIPPED | OUTPATIENT
Start: 2023-09-15 | End: 2024-09-14

## 2023-09-15 NOTE — TELEPHONE ENCOUNTER
----- Message from Roya Abad MA sent at 9/14/2023  8:02 PM CDT -----  Pt is requesting 800mg ibuprofen. Please advise  ----- Message -----  From: Ana Albarran  Sent: 9/14/2023   1:22 PM CDT  To: Ashlie Moya Staff    Type:  Needs Medical Advice    Who Called:  Pt  Symptoms (please be specific):  Pain   How long has patient had these symptoms:   1 day  Pharmacy name and phone #:   [id number] - [address] AT [facility] [address]   Phone:  [phone]  Would the patient rather a call back or a response via [facility]?  call  Best Call Back Number:  [phone]  Additional Information:  Pt had a hysterectomy done  yesterday  9/13/23 and was prescribed ibuprofen (ADVIL,MOTRIN) 600 MG tablet [[id number]].  Pt is requesting 800mg.  Pt states 600 mg is not effective.

## 2023-09-15 NOTE — TELEPHONE ENCOUNTER
[place]    PCP:  [facility] [place]    S/P Robotic Hysterectomy, Bilat Salpingectomy, and Rt Oophoretomy.  Denies fever, bleeding, discharge, uncontrolled pain, vomiting, abdominal pain, rectal pressure, and painful urination.  Last BM was last week on Monday (9/4).  Per protocol, care advised is see in office today.  Care advice per protocol.  Advised to follow Surgeon/MD's instruction if different from protocol advice.  Advised to PCP/OD VV/UCC/ED.  Pt VU and will do OD VV.  Advised to call for worsening/questions/concerns.  VU.     Reason for Disposition   Last bowel movement (BM) > 4 days ago    Additional Information   Negative: Sounds like a life-threatening emergency to the triager   Constipation   Negative: Vomiting bile (green color)   Negative: Patient sounds very sick or weak to the triager   Negative: Constant abdominal pain lasting > 2 hours   Negative: Vomiting and abdomen looks much more swollen than usual   Negative: Rectal pain or fullness from fecal impaction (rectum full of stool) and NOT better after SITZ bath, suppository or enema   Negative: Abdomen is more swollen than usual    Protocols used: Post-Op Symptoms and Agsykayil-P-KF, Constipation-A-OH

## 2023-09-18 ENCOUNTER — TELEPHONE (OUTPATIENT)
Dept: OBSTETRICS AND GYNECOLOGY | Facility: CLINIC | Age: 48
End: 2023-09-18
Payer: MEDICAID

## 2023-09-18 LAB
FINAL PATHOLOGIC DIAGNOSIS: NORMAL
GROSS: NORMAL
Lab: NORMAL

## 2023-09-18 NOTE — TELEPHONE ENCOUNTER
----- Message from Tiki Villanueva sent at 9/18/2023  8:29 AM CDT -----  Regarding: Medical Assistance  Contact: Patient  Patient is requesting a callback in regards to current medical condition   Patient stated she had a procedure completed 09/13/2023   Patient stated she has a bowel movement but she had another this morning and their was slight blood on the tissue   Patient would like a call back to further discuss how  to proceed   Please Assist     Patient can be reached at [phone]

## 2023-09-19 ENCOUNTER — OFFICE VISIT (OUTPATIENT)
Dept: OBSTETRICS AND GYNECOLOGY | Facility: CLINIC | Age: 48
End: 2023-09-19
Payer: MEDICAID

## 2023-09-19 ENCOUNTER — NURSE TRIAGE (OUTPATIENT)
Dept: ADMINISTRATIVE | Facility: CLINIC | Age: 48
End: 2023-09-19
Payer: MEDICAID

## 2023-09-19 VITALS
DIASTOLIC BLOOD PRESSURE: 104 MMHG | SYSTOLIC BLOOD PRESSURE: 173 MMHG | WEIGHT: 224.88 LBS | BODY MASS INDEX: 39.83 KG/M2

## 2023-09-19 DIAGNOSIS — Z98.890 POSTOPERATIVE STATE: Primary | ICD-10-CM

## 2023-09-19 PROCEDURE — 99999 PR PBB SHADOW E&M-EST. PATIENT-LVL III: CPT | Mod: PBBFAC,,, | Performed by: OBSTETRICS & GYNECOLOGY

## 2023-09-19 PROCEDURE — 1160F PR REVIEW ALL MEDS BY PRESCRIBER/CLIN PHARMACIST DOCUMENTED: ICD-10-PCS | Mod: CPTII,,, | Performed by: OBSTETRICS & GYNECOLOGY

## 2023-09-19 PROCEDURE — 99024 PR POST-OP FOLLOW-UP VISIT: ICD-10-PCS | Mod: ,,, | Performed by: OBSTETRICS & GYNECOLOGY

## 2023-09-19 PROCEDURE — 3077F PR MOST RECENT SYSTOLIC BLOOD PRESSURE >= 140 MM HG: ICD-10-PCS | Mod: CPTII,,, | Performed by: OBSTETRICS & GYNECOLOGY

## 2023-09-19 PROCEDURE — 1160F RVW MEDS BY RX/DR IN RCRD: CPT | Mod: CPTII,,, | Performed by: OBSTETRICS & GYNECOLOGY

## 2023-09-19 PROCEDURE — 1159F MED LIST DOCD IN RCRD: CPT | Mod: CPTII,,, | Performed by: OBSTETRICS & GYNECOLOGY

## 2023-09-19 PROCEDURE — 99213 OFFICE O/P EST LOW 20 MIN: CPT | Mod: PBBFAC,PO | Performed by: OBSTETRICS & GYNECOLOGY

## 2023-09-19 PROCEDURE — 1159F PR MEDICATION LIST DOCUMENTED IN MEDICAL RECORD: ICD-10-PCS | Mod: CPTII,,, | Performed by: OBSTETRICS & GYNECOLOGY

## 2023-09-19 PROCEDURE — 4010F ACE/ARB THERAPY RXD/TAKEN: CPT | Mod: CPTII,,, | Performed by: OBSTETRICS & GYNECOLOGY

## 2023-09-19 PROCEDURE — 3008F BODY MASS INDEX DOCD: CPT | Mod: CPTII,,, | Performed by: OBSTETRICS & GYNECOLOGY

## 2023-09-19 PROCEDURE — 99999 PR PBB SHADOW E&M-EST. PATIENT-LVL III: ICD-10-PCS | Mod: PBBFAC,,, | Performed by: OBSTETRICS & GYNECOLOGY

## 2023-09-19 PROCEDURE — 99024 POSTOP FOLLOW-UP VISIT: CPT | Mod: ,,, | Performed by: OBSTETRICS & GYNECOLOGY

## 2023-09-19 PROCEDURE — 3080F DIAST BP >= 90 MM HG: CPT | Mod: CPTII,,, | Performed by: OBSTETRICS & GYNECOLOGY

## 2023-09-19 PROCEDURE — 3080F PR MOST RECENT DIASTOLIC BLOOD PRESSURE >= 90 MM HG: ICD-10-PCS | Mod: CPTII,,, | Performed by: OBSTETRICS & GYNECOLOGY

## 2023-09-19 PROCEDURE — 3077F SYST BP >= 140 MM HG: CPT | Mod: CPTII,,, | Performed by: OBSTETRICS & GYNECOLOGY

## 2023-09-19 PROCEDURE — 3008F PR BODY MASS INDEX (BMI) DOCUMENTED: ICD-10-PCS | Mod: CPTII,,, | Performed by: OBSTETRICS & GYNECOLOGY

## 2023-09-19 PROCEDURE — 4010F PR ACE/ARB THEARPY RXD/TAKEN: ICD-10-PCS | Mod: CPTII,,, | Performed by: OBSTETRICS & GYNECOLOGY

## 2023-09-19 NOTE — TELEPHONE ENCOUNTER
Reason for Disposition   Pus or bad-smelling fluid draining from incision    Additional Information   Negative: Major abdominal surgical incision and wound gaping open with visible internal organs   Negative: Sounds like a life-threatening emergency to the triager   Negative: Bleeding from incision and won't stop after 10 minutes of direct pressure   Negative: Bleeding (more than a few drops) from incision and after tracheostomy or blood vessel surgery (e.g., carotid endarterectomy, femoral bypass graft, kidney dialysis fistula)   Negative: Bright red, wide-spread, sunburn-like rash   Negative: SEVERE pain in the incision   Negative: Incision gaping open and < 2 days (48 hours) since wound re-opened   Negative: Incision gaping open and length of opening > 2 inches (5 cm)   Negative: Patient sounds very sick or weak to the triager   Negative: Sounds like a serious complication to the triager   Negative: Fever > 100.4 F (38.0 C)     Subjectively reports no fever   Negative: Incision looks infected (spreading redness, pain)   Negative: Red streak runs from the incision and longer than 1 inch (2.5 cm)    Protocols used: Post-Op Incision Symptoms and Rwjpctgvy-Z-MU    Maru called to say she did have hysterectomy 09/13/2023 Dr Griffin Dailey, and she checked under her bandages on her abdomen yesterday.  The right side of her abdomen is wet, with foul odor and drainage that looks like pus, she said. She says the bandage was placed after surgery, and has not been changed since then. She left the bandage on as it is draining and it looks like the incision is opening a little bit. Per [facility] triage protocol, recommend she come in to office now for evaluation.  I was not able to schedule Maru on Dr Dailey's schedule today, so recommend [facility] for evaluation.  Assured her that I am messaging Dr Dailey's staff with request to call her now if they are able to see her this morning.  She will go to [facility] if she does not hear  from them right away.  Please contact caller directly with any additional care advice.

## 2023-09-19 NOTE — TELEPHONE ENCOUNTER
Called patient Maru to let her know that Dr Dailey has instructed that she be scheduled today for incision check at 1 PM.  She would like a call from his nurse to let her know which clinic he will be in for this appointment.  Assured her that I will message MD staff.  Please contact caller directly with any additional care advice as soon as possible.

## 2023-09-19 NOTE — PROGRESS NOTES
DATE OF PROCEDURE:  9/13/23     PREOPERATIVE DIAGNOSIS:    Menorrhagia, rt ovarian cyst and hydrosalpinx     POSTOPERATIVE DIAGNOSIS:    Same as preop     SURGERY:  Robotic TLH/BS at rt oopherectomy     SURGEON:  Griffin Dailey M.D.     ASSISTANT:  Nidhi Chacon     ANESTHESIA:  General endotracheal tube anesthesia.     ESTIMATED BLOOD LOSS:  20 mL     FINDINGS:  dilated bilateral tubes and nml left ovaries and normal uterus.extensive adhesion of colon to post uterus     SPECIMENS:  Uterus and cervix. Bilateral tubes , rt ovary  COMPLICATIONS:  None.  Component 6 d ago   Final Pathologic Diagnosis Uterus, cervix, bilateral fallopian tubes and right ovary weighing 147 g showing:   Secretory endometrium with adenomyosis   The cervix has nabothian cysts   The right ovary has a hemorrhagic corpus luteum, a hemorrhagic simple cyst and multiple small benign follicle cysts.     The right fallopian tube is dilated and has small paratubal cysts   Unremarkable left fallopian tube    Comment: Interp By Giovanni Farley M.D., Signed on 09/18/2023 at 10:42   Incisions look good  Discussed pelvic rest  Rtc 6 wks

## 2023-10-20 ENCOUNTER — TELEPHONE (OUTPATIENT)
Dept: OBSTETRICS AND GYNECOLOGY | Facility: CLINIC | Age: 48
End: 2023-10-20
Payer: MEDICAID

## 2023-10-20 NOTE — TELEPHONE ENCOUNTER
----- Message from Teresa Katz sent at 10/17/2023 10:02 AM CDT -----  Type:  Paperwork for Unemployment     Who Called:Pt   Would the patient rather a call back or a response via [facility]? Call back   Best Call Back Number: [phone]  Additional Information: Please be advised, pt said they were told it would be take about a week to send in paperwork needed for unemployment to be sent over to disability, wants to know if it was sent already

## 2023-10-20 NOTE — TELEPHONE ENCOUNTER
----- Message from Nadeem Razo sent at 10/12/2023  3:01 PM CDT -----  Contact: Pt  .Type:  Needs Medical Advice  Who Called: pt  Would the patient rather a call back or a response via [facility]?  Call back  Best Call Back Number: [phone]  Additional Information: Pt. Is calling regarding some paperwork that was suppose to be fill out and faxed to the unemployment office.

## 2023-10-20 NOTE — TELEPHONE ENCOUNTER
Called pt and she stated she dropped off paperwork on last week to be completed by physician and she was told that it will be sent off to the disability office to be completed. I informed pt that there wasn't anything completed in the system, but I will check into it. Pt verbalized understanding.

## 2023-10-27 ENCOUNTER — TELEPHONE (OUTPATIENT)
Dept: OBSTETRICS AND GYNECOLOGY | Facility: CLINIC | Age: 48
End: 2023-10-27
Payer: MEDICAID

## 2023-10-27 NOTE — TELEPHONE ENCOUNTER
----- Message from Lucieetta Loaiza sent at 10/27/2023  2:47 PM CDT -----  Type:  Paperwork for Unemployment      Who Called:Pt   Would the patient rather a call back or a response via [facility]? Call back   Best Call Back Number: [phone]  Additional Information: Please be advised, pt said they were told it would be take about a week to send in paperwork needed for unemployment to be sent over to disability, wants to know if it was sent already .

## 2023-10-27 NOTE — TELEPHONE ENCOUNTER
----- Message from Katherine Palencia sent at 10/27/2023  4:03 PM CDT -----  Contact: pt  Type:  Unemployment paper work     Who Called: pt   Would the patient rather a call back or a response via [facility]? call  Best Call Back Number: [phone]  Additional Information:    pt been contacting office for days and no response; its been 10 days and she have to turn paperwork

## 2023-10-27 NOTE — TELEPHONE ENCOUNTER
----- Message from Gurjit Gudino sent at 10/27/2023  4:06 PM CDT -----  Type:  Patient Returning Call    Who Called:pt  Who Left Message for Patient:Jonathon  Does the patient know what this is regarding?:returning a call  Would the patient rather a call back or a response via [facility]? call  Best Call Back Number:[phone]  Additional Information:

## 2023-10-27 NOTE — TELEPHONE ENCOUNTER
Pt called about disability paperwork. Disability office says paper work has been received and signed. I gave pt number to contact disability office. All questions answered. Pt verbalized understanding.

## 2023-10-31 ENCOUNTER — OFFICE VISIT (OUTPATIENT)
Dept: OBSTETRICS AND GYNECOLOGY | Facility: CLINIC | Age: 48
End: 2023-10-31
Payer: MEDICAID

## 2023-10-31 VITALS
DIASTOLIC BLOOD PRESSURE: 120 MMHG | BODY MASS INDEX: 41.03 KG/M2 | WEIGHT: 231.63 LBS | SYSTOLIC BLOOD PRESSURE: 129 MMHG

## 2023-10-31 DIAGNOSIS — N89.8 VAGINAL DISCHARGE: ICD-10-CM

## 2023-10-31 DIAGNOSIS — Z98.890 POSTOPERATIVE STATE: Primary | ICD-10-CM

## 2023-10-31 PROCEDURE — 3074F PR MOST RECENT SYSTOLIC BLOOD PRESSURE < 130 MM HG: ICD-10-PCS | Mod: CPTII,,, | Performed by: OBSTETRICS & GYNECOLOGY

## 2023-10-31 PROCEDURE — 1160F PR REVIEW ALL MEDS BY PRESCRIBER/CLIN PHARMACIST DOCUMENTED: ICD-10-PCS | Mod: CPTII,,, | Performed by: OBSTETRICS & GYNECOLOGY

## 2023-10-31 PROCEDURE — 1160F RVW MEDS BY RX/DR IN RCRD: CPT | Mod: CPTII,,, | Performed by: OBSTETRICS & GYNECOLOGY

## 2023-10-31 PROCEDURE — 1159F MED LIST DOCD IN RCRD: CPT | Mod: CPTII,,, | Performed by: OBSTETRICS & GYNECOLOGY

## 2023-10-31 PROCEDURE — 4010F ACE/ARB THERAPY RXD/TAKEN: CPT | Mod: CPTII,,, | Performed by: OBSTETRICS & GYNECOLOGY

## 2023-10-31 PROCEDURE — 3074F SYST BP LT 130 MM HG: CPT | Mod: CPTII,,, | Performed by: OBSTETRICS & GYNECOLOGY

## 2023-10-31 PROCEDURE — 99213 OFFICE O/P EST LOW 20 MIN: CPT | Mod: PBBFAC,PO | Performed by: OBSTETRICS & GYNECOLOGY

## 2023-10-31 PROCEDURE — 81514 NFCT DS BV&VAGINITIS DNA ALG: CPT | Performed by: OBSTETRICS & GYNECOLOGY

## 2023-10-31 PROCEDURE — 4010F PR ACE/ARB THEARPY RXD/TAKEN: ICD-10-PCS | Mod: CPTII,,, | Performed by: OBSTETRICS & GYNECOLOGY

## 2023-10-31 PROCEDURE — 3080F PR MOST RECENT DIASTOLIC BLOOD PRESSURE >= 90 MM HG: ICD-10-PCS | Mod: CPTII,,, | Performed by: OBSTETRICS & GYNECOLOGY

## 2023-10-31 PROCEDURE — 3080F DIAST BP >= 90 MM HG: CPT | Mod: CPTII,,, | Performed by: OBSTETRICS & GYNECOLOGY

## 2023-10-31 PROCEDURE — 1159F PR MEDICATION LIST DOCUMENTED IN MEDICAL RECORD: ICD-10-PCS | Mod: CPTII,,, | Performed by: OBSTETRICS & GYNECOLOGY

## 2023-10-31 PROCEDURE — 99999 PR PBB SHADOW E&M-EST. PATIENT-LVL III: CPT | Mod: PBBFAC,,, | Performed by: OBSTETRICS & GYNECOLOGY

## 2023-10-31 PROCEDURE — 99999 PR PBB SHADOW E&M-EST. PATIENT-LVL III: ICD-10-PCS | Mod: PBBFAC,,, | Performed by: OBSTETRICS & GYNECOLOGY

## 2023-10-31 PROCEDURE — 99024 PR POST-OP FOLLOW-UP VISIT: ICD-10-PCS | Mod: ,,, | Performed by: OBSTETRICS & GYNECOLOGY

## 2023-10-31 PROCEDURE — 99024 POSTOP FOLLOW-UP VISIT: CPT | Mod: ,,, | Performed by: OBSTETRICS & GYNECOLOGY

## 2023-10-31 RX ORDER — METRONIDAZOLE 500 MG/1
500 TABLET ORAL EVERY 12 HOURS
Qty: 14 TABLET | Refills: 0 | Status: SHIPPED | OUTPATIENT
Start: 2023-10-31

## 2023-10-31 NOTE — LETTER
October 31, 2023      [facility]  [address]  [address]  [place] [address]  Phone: [phone]       Patient: Maru Saucedo Camacho   YOB: 1975  Date of Visit: 10/31/2023    To Whom It May Concern:    Nando Camacho  was at [facility] on 10/31/2023. The patient may return to work/school on 10/31/23 with no restrictions. If you have any questions or concerns, or if I can be of further assistance, please do not hesitate to contact me.    Sincerely,    So Negro MA

## 2023-10-31 NOTE — PROGRESS NOTES
DATE OF PROCEDURE:  9/13/23     PREOPERATIVE DIAGNOSIS:    Menorrhagia, rt ovarian cyst and hydrosalpinx     POSTOPERATIVE DIAGNOSIS:    Same as preop     SURGERY:  Robotic TLH/BS at rt oopherectomy     SURGEON:  Griffin Dailey M.D.     ASSISTANT:  Nidhi Chacon     ANESTHESIA:  General endotracheal tube anesthesia.     ESTIMATED BLOOD LOSS:  20 mL     FINDINGS:  dilated bilateral tubes and nml left ovaries and normal uterus.extensive adhesion of colon to post uterus     SPECIMENS:  Uterus and cervix. Bilateral tubes , rt ovary    Final Pathologic Diagnosis Uterus, cervix, bilateral fallopian tubes and right ovary weighing 147 g showing:   Secretory endometrium with adenomyosis   The cervix has nabothian cysts   The right ovary has a hemorrhagic corpus luteum, a hemorrhagic simple cyst and multiple small benign follicle cysts.     The right fallopian tube is dilated and has small paratubal cysts   Unremarkable left fallopian tube    Comment: Interp By Giovanni Farley M.D., Signed on 09/18/2023 at 10:42   Cuff healed, scant dc  Rx flagyl no restrictions

## 2023-11-02 ENCOUNTER — TELEPHONE (OUTPATIENT)
Dept: OBSTETRICS AND GYNECOLOGY | Facility: CLINIC | Age: 48
End: 2023-11-02
Payer: MEDICAID

## 2023-11-02 LAB
BACTERIAL VAGINOSIS DNA: POSITIVE
CANDIDA GLABRATA DNA: NEGATIVE
CANDIDA KRUSEI DNA: NEGATIVE
CANDIDA RRNA VAG QL PROBE: NEGATIVE
T VAGINALIS RRNA GENITAL QL PROBE: NEGATIVE

## 2023-11-27 ENCOUNTER — HOSPITAL ENCOUNTER (EMERGENCY)
Facility: HOSPITAL | Age: 48
Discharge: HOME | End: 2023-11-27
Attending: EMERGENCY MEDICINE
Payer: MEDICAID

## 2023-11-27 VITALS
RESPIRATION RATE: 16 BRPM | WEIGHT: 231 LBS | SYSTOLIC BLOOD PRESSURE: 167 MMHG | DIASTOLIC BLOOD PRESSURE: 103 MMHG | HEIGHT: 63 IN | OXYGEN SATURATION: 100 % | TEMPERATURE: 98 F | BODY MASS INDEX: 40.93 KG/M2 | HEART RATE: 73 BPM

## 2023-11-27 DIAGNOSIS — M67.40 GANGLION CYST: Primary | ICD-10-CM

## 2023-11-27 PROCEDURE — 99281 EMR DPT VST MAYX REQ PHY/QHP: CPT | Mod: ER

## 2023-11-28 NOTE — ED PROVIDER NOTES
Encounter Date: 11/27/2023       History     Chief Complaint   Patient presents with    Cyst     Cyst to left anterior hand since August. Became more painful in recent days.      47 yr old female presents to the ER with reports of a cyst to the left hand that has been present since August. Pt states she is here to have referrals placed for assistance in removing. No direct injury reported. No erythema, no fever. No drainage. PMH of HTN and seizures.     The history is provided by the patient. No  was used.     Review of patient's allergies indicates:   Allergen Reactions    Shellfish containing products Hives     Past Medical History:   Diagnosis Date    Hypertension     Seizures      Past Surgical History:   Procedure Laterality Date    ADENOIDECTOMY      DILATION AND CURETTAGE OF UTERUS      ROBOT-ASSISTED LAPAROSCOPIC HYSTERECTOMY N/A 9/13/2023    Procedure: ROBOTIC HYSTERECTOMY;  Surgeon: Griffin Dailey MD;  Location: [facility] OR;  Service: OB/GYN;  Laterality: N/A;    ROBOT-ASSISTED LAPAROSCOPIC OOPHORECTOMY Right 9/13/2023    Procedure: ROBOTIC OOPHORECTOMY;  Surgeon: Griffin Dailey MD;  Location: [facility] OR;  Service: OB/GYN;  Laterality: Right;    ROBOT-ASSISTED SALPINGECTOMY Bilateral 9/13/2023    Procedure: ROBOTIC SALPINGECTOMY;  Surgeon: Griffin Dailey MD;  Location: [facility] OR;  Service: OB/GYN;  Laterality: Bilateral;  Right Oophorectomy     No family history on file.  Social History     Tobacco Use    Smoking status: Never    Smokeless tobacco: Never   Substance Use Topics    Alcohol use: No    Drug use: Never     Review of Systems   Musculoskeletal:         Left hand cyst   All other systems reviewed and are negative.      Physical Exam     Initial Vitals [11/27/23 1950]   BP Pulse Resp Temp SpO2   (!) 167/103 73 16 97.8 °F (36.6 °C) 100 %      MAP       --         Physical Exam    Constitutional: She appears well-developed and well-nourished.   HENT:   Head: Normocephalic.    Right Ear: Hearing and tympanic membrane normal.   Left Ear: Hearing and tympanic membrane normal.   Nose: Nose normal.   Mouth/Throat: Oropharynx is clear and moist.   Eyes: Lids are normal. Pupils are equal, round, and reactive to light.   Neck:   Normal range of motion.  Cardiovascular:  Normal rate.           Pulmonary/Chest: Breath sounds normal. No respiratory distress. She has no wheezes. She has no rhonchi.   Abdominal: Abdomen is soft. There is no abdominal tenderness.   Musculoskeletal:         General: Normal range of motion.      Cervical back: Normal range of motion. No rigidity.     Neurological: She is alert and oriented to person, place, and time.   Skin: Skin is warm and dry. No rash noted.   ? Ganglion cyst noted to the dorsal aspect of the left hand/wrist. No erythema noted. Mobile. + radial pulse noted. FULL ROM of hand and wrist.    Psychiatric: She has a normal mood and affect. Her behavior is normal. Judgment and thought content normal.         ED Course   Procedures  Labs Reviewed - No data to display       Imaging Results    None          Medications - No data to display  Medical Decision Making  Differential Diagnosis includes, but is not limited to:  Fracture, dislocation, compartment syndrome, nerve injury/palsy, vascular injury, DVT, rhabdomyolysis, hemarthrosis, septic joint, cellulitis, bursitis, muscle strain, ligament tear/sprain, laceration, foreign body, abrasion, soft tissue contusion, osteoarthritis.                  ED Course as of 11/27/23 1955 Mon Nov 27, 2023 1954 Pt notified of the need for follow up care with Dr Isaias zhang. Pt is not toxic in appearance. There is no evidence of infection noted at this time. No erythema or drainage. In addition, strict return precautions given to the ER. Ibuprofen for pain if needed, otherwise stable for dc.  [DT]      ED Course User Index  [DT] Lily Garsia NP                           Clinical Impression:  Final  diagnoses:  [M67.40] Ganglion cyst (Primary)          ED Disposition Condition    Discharge Stable          ED Prescriptions    None       Follow-up Information       Follow up With Specialties Details Why Contact Info    Connor Esparza Jr., MD Hand Surgery, Orthopedic Surgery Schedule an appointment as soon as possible for a visit in 2 days  [address] [address]  [phone]               Lily Garsia NP  11/27/23 1955

## 2023-11-28 NOTE — DISCHARGE INSTRUCTIONS

## 2023-11-29 ENCOUNTER — TELEPHONE (OUTPATIENT)
Dept: ORTHOPEDICS | Facility: CLINIC | Age: 48
End: 2023-11-29
Payer: MEDICAID

## 2023-11-29 NOTE — TELEPHONE ENCOUNTER
----- Message from Leila Munoz sent at 11/29/2023  8:20 AM CST -----  Type:  Needs Medical Advice    Who Called: Pt  Symptoms (please be specific): M67.40 (ICD-10-CM) - Ganglion cyst  How long has patient had these symptoms:  N/A  Would the patient rather a call back or a response via [facility]? Call back   Best Call Back Number: [phone]  Additional Information: Pt has a referral from ER

## 2023-12-07 ENCOUNTER — TELEPHONE (OUTPATIENT)
Dept: OBSTETRICS AND GYNECOLOGY | Facility: CLINIC | Age: 48
End: 2023-12-07
Payer: MEDICAID

## 2023-12-07 NOTE — TELEPHONE ENCOUNTER
----- Message from Nadeem Razo sent at 12/7/2023  7:42 AM CST -----  Contact: Pt  .Type:  Needs Medical Advice    Who Called: pt  Would the patient rather a call back or a response via [facility]?  Call back  Best Call Back Number: [phone]   Additional Information:  Pt. Is requesting a call back regarding her disability paperwork

## 2024-04-18 ENCOUNTER — TELEPHONE (OUTPATIENT)
Dept: ORTHOPEDICS | Facility: CLINIC | Age: 49
End: 2024-04-18
Payer: MEDICAID

## 2024-04-18 NOTE — TELEPHONE ENCOUNTER
----- Message from Saud Sawant sent at 4/18/2024  3:03 PM CDT -----  Type:  Appointment Request         Name of Caller:pt  When is the first available appointment?No access  Symptoms:cyst on hand  Would the patient rather a call back or a response via [facility]? call  Best Call Back Number: [phone]   Additional Information: Pt states cyst on hand is growing, and also causing shooting pain up her arm and fingers. Please call with like to be seen soon as possible. Thank you.

## 2024-06-05 ENCOUNTER — OFFICE VISIT (OUTPATIENT)
Dept: ORTHOPEDICS | Facility: CLINIC | Age: 49
End: 2024-06-05

## 2024-06-05 DIAGNOSIS — M67.40 GANGLION CYST: Primary | ICD-10-CM

## 2024-06-05 PROCEDURE — 99204 OFFICE O/P NEW MOD 45 MIN: CPT | Mod: S$PBB,,, | Performed by: ORTHOPAEDIC SURGERY

## 2024-06-05 PROCEDURE — 99999 PR PBB SHADOW E&M-EST. PATIENT-LVL II: CPT | Mod: PBBFAC,,, | Performed by: ORTHOPAEDIC SURGERY

## 2024-06-05 PROCEDURE — 99212 OFFICE O/P EST SF 10 MIN: CPT | Mod: PBBFAC,PN | Performed by: ORTHOPAEDIC SURGERY

## 2024-06-05 RX ORDER — CEFAZOLIN SODIUM 2 G/50ML
2 SOLUTION INTRAVENOUS
OUTPATIENT
Start: 2024-06-05

## 2024-06-05 NOTE — H&P (VIEW-ONLY)
CC:  Ganglion cyst left wrist dorsal        HPI:  Maru Camacho is a very pleasant 48 y.o. female with painful mass left wrist for the past year   Symptoms worse with use   No numbness or tingling reported   No history of trauma            PAST MEDICAL HISTORY:   Past Medical History:   Diagnosis Date    Hypertension     Seizures      PAST SURGICAL HISTORY:   Past Surgical History:   Procedure Laterality Date    ADENOIDECTOMY      DILATION AND CURETTAGE OF UTERUS      ROBOT-ASSISTED LAPAROSCOPIC HYSTERECTOMY N/A 9/13/2023    Procedure: ROBOTIC HYSTERECTOMY;  Surgeon: Griffin Dailey MD;  Location: [facility] OR;  Service: OB/GYN;  Laterality: N/A;    ROBOT-ASSISTED LAPAROSCOPIC OOPHORECTOMY Right 9/13/2023    Procedure: ROBOTIC OOPHORECTOMY;  Surgeon: Griffin Dailey MD;  Location: [facility] OR;  Service: OB/GYN;  Laterality: Right;    ROBOT-ASSISTED SALPINGECTOMY Bilateral 9/13/2023    Procedure: ROBOTIC SALPINGECTOMY;  Surgeon: Griffin Dailey MD;  Location: [facility] OR;  Service: OB/GYN;  Laterality: Bilateral;  Right Oophorectomy     FAMILY HISTORY: No family history on file.  SOCIAL HISTORY:   Social History     Socioeconomic History    Marital status: Single   Tobacco Use    Smoking status: Never    Smokeless tobacco: Never   Substance and Sexual Activity    Alcohol use: No    Drug use: Never    Sexual activity: Yes     Partners: Male     Birth control/protection: None       MEDICATIONS:   Current Outpatient Medications:     amLODIPine (NORVASC) 10 MG tablet, Take 10 mg by mouth once daily., Disp: , Rfl:     ibuprofen (ADVIL,MOTRIN) 800 MG tablet, Take 1 tablet (800 mg total) by mouth every 8 (eight) hours as needed for Pain. (Patient not taking: Reported on 6/5/2024), Disp: 60 tablet, Rfl: 2    losartan (COZAAR) 100 MG tablet, Take 100 mg by mouth. (Patient not taking: Reported on 6/5/2024), Disp: , Rfl:     metroNIDAZOLE (FLAGYL) 500 MG tablet, Take 1 tablet (500 mg total) by mouth every 12 (twelve) hours.  (Patient not taking: Reported on 6/5/2024), Disp: 14 tablet, Rfl: 0    oxyCODONE-acetaminophen (PERCOCET) 5-325 mg per tablet, Take 1 tablet by mouth every 4 (four) hours as needed for Pain. (Patient not taking: Reported on 6/5/2024), Disp: 30 tablet, Rfl: 0  No current facility-administered medications for this visit.    Facility-Administered Medications Ordered in Other Visits:     lactated ringers infusion, , Intravenous, Continuous, Vilma Briggs DNP, Stopped at 09/13/23 1058    LIDOcaine (PF) 10 mg/ml (1%) injection 10 mg, 1 mL, Intradermal, Once, Vilma Briggs DNP  ALLERGIES:   Review of patient's allergies indicates:   Allergen Reactions    Shellfish containing products Hives       Review of Systems:  Constitutional: no fever or chills  ENT: no nasal congestion or sore throat  Respiratory: no cough or shortness of breath  Cardiovascular: no chest pain or palpitations  Gastrointestinal: no nausea or vomiting, PUD, GERD, NSAID intolerance  Genitourinary: no hematuria or dysuria  Integument/Breast: no rash or pruritis  Hematologic/Lymphatic: no easy bruising or lymphadenopathy  Musculoskeletal: see HPI  Neurological: no seizures or tremors  Behavioral/Psych: no auditory or visual hallucinations      Physical Exam   Vitals:    06/05/24 1426   PainSc: 0-No pain       Constitutional: Oriented to person, place, and time. Appears well-developed and well-nourished.   HENT:   Head: Normocephalic and atraumatic.   Nose: Nose normal.   Eyes: No scleral icterus.   Neck: Normal range of motion.   Cardiovascular: Normal rate and regular rhythm.    Pulses:       Radial pulses are 2+ on the right side, and 2+ on the left side.   Pulmonary/Chest: Effort normal and breath sounds normal.   Abdominal: Soft.   Neurological: Alert and oriented to person, place, and time.   Skin: Skin is warm.   Psychiatric: Normal mood and affect.     MUSCULOSKELETAL UPPER EXTREMITY:  Examination left hand soft tissue mass on the dorsum  "left wrist appears to be multilobulated mildly tender   Consistent with a ganglion cyst   Range of motion wrist fingers full without pain               Diagnostic Studies:  None        Assessment:  Ganglion cyst dorsal left wrist symptomatic    Plan:  I explained the nature of the problem to the patient   She would like to set up surgery as an outpatient for excisional biopsy of the cyst      The risks and benefits of surgery were discussed with the patient today and they understand.  The consent was signed in the office for surgery.      Connor Esparza MD (Jay)  [facility]  Orthopedic Upper Extremity Surgery       "

## 2024-06-05 NOTE — PROGRESS NOTES
CC:  Ganglion cyst left wrist dorsal        HPI:  Maru Camacho is a very pleasant 48 y.o. female with painful mass left wrist for the past year   Symptoms worse with use   No numbness or tingling reported   No history of trauma            PAST MEDICAL HISTORY:   Past Medical History:   Diagnosis Date    Hypertension     Seizures      PAST SURGICAL HISTORY:   Past Surgical History:   Procedure Laterality Date    ADENOIDECTOMY      DILATION AND CURETTAGE OF UTERUS      ROBOT-ASSISTED LAPAROSCOPIC HYSTERECTOMY N/A 9/13/2023    Procedure: ROBOTIC HYSTERECTOMY;  Surgeon: Griffin Dailey MD;  Location: [facility] OR;  Service: OB/GYN;  Laterality: N/A;    ROBOT-ASSISTED LAPAROSCOPIC OOPHORECTOMY Right 9/13/2023    Procedure: ROBOTIC OOPHORECTOMY;  Surgeon: Griffin Dailey MD;  Location: [facility] OR;  Service: OB/GYN;  Laterality: Right;    ROBOT-ASSISTED SALPINGECTOMY Bilateral 9/13/2023    Procedure: ROBOTIC SALPINGECTOMY;  Surgeon: Griffin Dailey MD;  Location: [facility] OR;  Service: OB/GYN;  Laterality: Bilateral;  Right Oophorectomy     FAMILY HISTORY: No family history on file.  SOCIAL HISTORY:   Social History     Socioeconomic History    Marital status: Single   Tobacco Use    Smoking status: Never    Smokeless tobacco: Never   Substance and Sexual Activity    Alcohol use: No    Drug use: Never    Sexual activity: Yes     Partners: Male     Birth control/protection: None       MEDICATIONS:   Current Outpatient Medications:     amLODIPine (NORVASC) 10 MG tablet, Take 10 mg by mouth once daily., Disp: , Rfl:     ibuprofen (ADVIL,MOTRIN) 800 MG tablet, Take 1 tablet (800 mg total) by mouth every 8 (eight) hours as needed for Pain. (Patient not taking: Reported on 6/5/2024), Disp: 60 tablet, Rfl: 2    losartan (COZAAR) 100 MG tablet, Take 100 mg by mouth. (Patient not taking: Reported on 6/5/2024), Disp: , Rfl:     metroNIDAZOLE (FLAGYL) 500 MG tablet, Take 1 tablet (500 mg total) by mouth every 12 (twelve) hours.  (Patient not taking: Reported on 6/5/2024), Disp: 14 tablet, Rfl: 0    oxyCODONE-acetaminophen (PERCOCET) 5-325 mg per tablet, Take 1 tablet by mouth every 4 (four) hours as needed for Pain. (Patient not taking: Reported on 6/5/2024), Disp: 30 tablet, Rfl: 0  No current facility-administered medications for this visit.    Facility-Administered Medications Ordered in Other Visits:     lactated ringers infusion, , Intravenous, Continuous, Vilma Briggs DNP, Stopped at 09/13/23 1058    LIDOcaine (PF) 10 mg/ml (1%) injection 10 mg, 1 mL, Intradermal, Once, Vilma Briggs DNP  ALLERGIES:   Review of patient's allergies indicates:   Allergen Reactions    Shellfish containing products Hives       Review of Systems:  Constitutional: no fever or chills  ENT: no nasal congestion or sore throat  Respiratory: no cough or shortness of breath  Cardiovascular: no chest pain or palpitations  Gastrointestinal: no nausea or vomiting, PUD, GERD, NSAID intolerance  Genitourinary: no hematuria or dysuria  Integument/Breast: no rash or pruritis  Hematologic/Lymphatic: no easy bruising or lymphadenopathy  Musculoskeletal: see HPI  Neurological: no seizures or tremors  Behavioral/Psych: no auditory or visual hallucinations      Physical Exam   Vitals:    06/05/24 1426   PainSc: 0-No pain       Constitutional: Oriented to person, place, and time. Appears well-developed and well-nourished.   HENT:   Head: Normocephalic and atraumatic.   Nose: Nose normal.   Eyes: No scleral icterus.   Neck: Normal range of motion.   Cardiovascular: Normal rate and regular rhythm.    Pulses:       Radial pulses are 2+ on the right side, and 2+ on the left side.   Pulmonary/Chest: Effort normal and breath sounds normal.   Abdominal: Soft.   Neurological: Alert and oriented to person, place, and time.   Skin: Skin is warm.   Psychiatric: Normal mood and affect.     MUSCULOSKELETAL UPPER EXTREMITY:  Examination left hand soft tissue mass on the dorsum  "left wrist appears to be multilobulated mildly tender   Consistent with a ganglion cyst   Range of motion wrist fingers full without pain               Diagnostic Studies:  None        Assessment:  Ganglion cyst dorsal left wrist symptomatic    Plan:  I explained the nature of the problem to the patient   She would like to set up surgery as an outpatient for excisional biopsy of the cyst      The risks and benefits of surgery were discussed with the patient today and they understand.  The consent was signed in the office for surgery.      Connor Esparza MD (Jay)  [facility]  Orthopedic Upper Extremity Surgery       "

## 2024-06-07 ENCOUNTER — TELEPHONE (OUTPATIENT)
Dept: SURGERY | Facility: HOSPITAL | Age: 49
End: 2024-06-07
Payer: COMMERCIAL

## 2024-06-07 DIAGNOSIS — I10 ESSENTIAL HYPERTENSION: Chronic | ICD-10-CM

## 2024-06-07 DIAGNOSIS — Z01.818 PRE-OP EVALUATION: Primary | ICD-10-CM

## 2024-06-13 ENCOUNTER — TELEPHONE (OUTPATIENT)
Dept: OBSTETRICS AND GYNECOLOGY | Facility: CLINIC | Age: 49
End: 2024-06-13
Payer: COMMERCIAL

## 2024-06-13 ENCOUNTER — HOSPITAL ENCOUNTER (OUTPATIENT)
Dept: CARDIOLOGY | Facility: HOSPITAL | Age: 49
Discharge: HOME | End: 2024-06-13
Attending: NURSE PRACTITIONER

## 2024-06-13 DIAGNOSIS — Z01.818 PRE-OP EVALUATION: ICD-10-CM

## 2024-06-13 DIAGNOSIS — I10 ESSENTIAL HYPERTENSION: Chronic | ICD-10-CM

## 2024-06-13 LAB
OHS QRS DURATION: 88 MS
OHS QTC CALCULATION: 446 MS

## 2024-06-13 PROCEDURE — 93010 ELECTROCARDIOGRAM REPORT: CPT | Mod: ,,, | Performed by: INTERNAL MEDICINE

## 2024-06-13 PROCEDURE — 93005 ELECTROCARDIOGRAM TRACING: CPT | Mod: PN

## 2024-06-13 NOTE — TELEPHONE ENCOUNTER
----- Message from Lena Rahman sent at 6/13/2024 12:00 PM CDT -----  Type: Orders    Who Called: Pt  Does the patient know what this is regarding?: would like blood test orders put in for STD testing   Would the patient rather a call back or a response via [facility]? Call  Best Call Back Number: [phone]  Additional Information:

## 2024-06-20 ENCOUNTER — HOSPITAL ENCOUNTER (OUTPATIENT)
Dept: PREADMISSION TESTING | Facility: HOSPITAL | Age: 49
Discharge: HOME | End: 2024-06-20
Attending: NURSE PRACTITIONER

## 2024-06-20 NOTE — PRE-PROCEDURE INSTRUCTIONS
Daughter.    Allergies, medical, surgical, family and psychosocial histories reviewed with patient. Periop plan of care reviewed. Preop instructions given, including medications to take and to hold. Hibiclens soap and instructions on use given. Time allotted for questions to be addressed.      Arrival time 0530.      Instructed to Amlodipine and Losartan the morning of surgery.

## 2024-06-20 NOTE — DISCHARGE INSTRUCTIONS

## 2024-07-01 ENCOUNTER — ANESTHESIA EVENT (OUTPATIENT)
Dept: SURGERY | Facility: HOSPITAL | Age: 49
End: 2024-07-01

## 2024-07-02 ENCOUNTER — HOSPITAL ENCOUNTER (OUTPATIENT)
Facility: HOSPITAL | Age: 49
Discharge: HOME | End: 2024-07-02
Attending: ORTHOPAEDIC SURGERY | Admitting: ORTHOPAEDIC SURGERY

## 2024-07-02 ENCOUNTER — ANESTHESIA (OUTPATIENT)
Dept: SURGERY | Facility: HOSPITAL | Age: 49
End: 2024-07-02

## 2024-07-02 VITALS
HEIGHT: 63 IN | RESPIRATION RATE: 18 BRPM | HEART RATE: 82 BPM | TEMPERATURE: 98 F | BODY MASS INDEX: 42.52 KG/M2 | WEIGHT: 240 LBS | SYSTOLIC BLOOD PRESSURE: 170 MMHG | OXYGEN SATURATION: 98 % | DIASTOLIC BLOOD PRESSURE: 74 MMHG

## 2024-07-02 DIAGNOSIS — M67.40 GANGLION CYST: ICD-10-CM

## 2024-07-02 PROCEDURE — 63600175 PHARM REV CODE 636 W HCPCS: Mod: JZ,JG | Performed by: ORTHOPAEDIC SURGERY

## 2024-07-02 PROCEDURE — 25000003 PHARM REV CODE 250: Performed by: STUDENT IN AN ORGANIZED HEALTH CARE EDUCATION/TRAINING PROGRAM

## 2024-07-02 PROCEDURE — 37000009 HC ANESTHESIA EA ADD 15 MINS: Performed by: ORTHOPAEDIC SURGERY

## 2024-07-02 PROCEDURE — 88304 TISSUE EXAM BY PATHOLOGIST: CPT | Performed by: STUDENT IN AN ORGANIZED HEALTH CARE EDUCATION/TRAINING PROGRAM

## 2024-07-02 PROCEDURE — 36000706: Performed by: ORTHOPAEDIC SURGERY

## 2024-07-02 PROCEDURE — 36000707: Performed by: ORTHOPAEDIC SURGERY

## 2024-07-02 PROCEDURE — 63600175 PHARM REV CODE 636 W HCPCS: Performed by: NURSE ANESTHETIST, CERTIFIED REGISTERED

## 2024-07-02 PROCEDURE — 63600175 PHARM REV CODE 636 W HCPCS: Performed by: ORTHOPAEDIC SURGERY

## 2024-07-02 PROCEDURE — 25000003 PHARM REV CODE 250: Performed by: ORTHOPAEDIC SURGERY

## 2024-07-02 PROCEDURE — 37000008 HC ANESTHESIA 1ST 15 MINUTES: Performed by: ORTHOPAEDIC SURGERY

## 2024-07-02 PROCEDURE — 25112 REREMOVE WRIST TENDON LESION: CPT | Mod: LT,,, | Performed by: ORTHOPAEDIC SURGERY

## 2024-07-02 PROCEDURE — 25000003 PHARM REV CODE 250: Performed by: NURSE ANESTHETIST, CERTIFIED REGISTERED

## 2024-07-02 PROCEDURE — 71000015 HC POSTOP RECOV 1ST HR: Performed by: ORTHOPAEDIC SURGERY

## 2024-07-02 RX ORDER — BUPIVACAINE HYDROCHLORIDE 5 MG/ML
INJECTION, SOLUTION EPIDURAL; INTRACAUDAL
Status: DISCONTINUED | OUTPATIENT
Start: 2024-07-02 | End: 2024-07-02 | Stop reason: HOSPADM

## 2024-07-02 RX ORDER — MIDAZOLAM HYDROCHLORIDE 1 MG/ML
INJECTION INTRAMUSCULAR; INTRAVENOUS
Status: DISCONTINUED | OUTPATIENT
Start: 2024-07-02 | End: 2024-07-02

## 2024-07-02 RX ORDER — DEXAMETHASONE SODIUM PHOSPHATE 4 MG/ML
INJECTION, SOLUTION INTRA-ARTICULAR; INTRALESIONAL; INTRAMUSCULAR; INTRAVENOUS; SOFT TISSUE
Status: DISCONTINUED | OUTPATIENT
Start: 2024-07-02 | End: 2024-07-02

## 2024-07-02 RX ORDER — PROPOFOL 10 MG/ML
VIAL (ML) INTRAVENOUS CONTINUOUS PRN
Status: DISCONTINUED | OUTPATIENT
Start: 2024-07-02 | End: 2024-07-02

## 2024-07-02 RX ORDER — LIDOCAINE HYDROCHLORIDE 20 MG/ML
INJECTION INTRAVENOUS
Status: DISCONTINUED | OUTPATIENT
Start: 2024-07-02 | End: 2024-07-02

## 2024-07-02 RX ORDER — OXYCODONE HYDROCHLORIDE 5 MG/1
10 TABLET ORAL EVERY 4 HOURS PRN
Status: DISCONTINUED | OUTPATIENT
Start: 2024-07-02 | End: 2024-07-02 | Stop reason: HOSPADM

## 2024-07-02 RX ORDER — ONDANSETRON 8 MG/1
8 TABLET, ORALLY DISINTEGRATING ORAL EVERY 8 HOURS PRN
Status: DISCONTINUED | OUTPATIENT
Start: 2024-07-02 | End: 2024-07-02 | Stop reason: HOSPADM

## 2024-07-02 RX ORDER — ACETAMINOPHEN 325 MG/1
650 TABLET ORAL EVERY 4 HOURS PRN
Status: DISCONTINUED | OUTPATIENT
Start: 2024-07-02 | End: 2024-07-02 | Stop reason: HOSPADM

## 2024-07-02 RX ORDER — LIDOCAINE HYDROCHLORIDE 10 MG/ML
INJECTION, SOLUTION EPIDURAL; INFILTRATION; INTRACAUDAL; PERINEURAL
Status: DISCONTINUED | OUTPATIENT
Start: 2024-07-02 | End: 2024-07-02 | Stop reason: HOSPADM

## 2024-07-02 RX ORDER — ONDANSETRON HYDROCHLORIDE 2 MG/ML
4 INJECTION, SOLUTION INTRAVENOUS DAILY PRN
Status: DISCONTINUED | OUTPATIENT
Start: 2024-07-02 | End: 2024-07-02 | Stop reason: HOSPADM

## 2024-07-02 RX ORDER — OXYCODONE HYDROCHLORIDE 5 MG/1
5 TABLET ORAL
Status: DISCONTINUED | OUTPATIENT
Start: 2024-07-02 | End: 2024-07-02 | Stop reason: HOSPADM

## 2024-07-02 RX ORDER — FENTANYL CITRATE 50 UG/ML
INJECTION, SOLUTION INTRAMUSCULAR; INTRAVENOUS
Status: DISCONTINUED | OUTPATIENT
Start: 2024-07-02 | End: 2024-07-02

## 2024-07-02 RX ORDER — KETOROLAC TROMETHAMINE 30 MG/ML
30 INJECTION, SOLUTION INTRAMUSCULAR; INTRAVENOUS ONCE
Status: COMPLETED | OUTPATIENT
Start: 2024-07-02 | End: 2024-07-02

## 2024-07-02 RX ORDER — CEFAZOLIN SODIUM 2 G/50ML
2 SOLUTION INTRAVENOUS
Status: DISCONTINUED | OUTPATIENT
Start: 2024-07-02 | End: 2024-07-02 | Stop reason: HOSPADM

## 2024-07-02 RX ORDER — IPRATROPIUM BROMIDE AND ALBUTEROL SULFATE 2.5; .5 MG/3ML; MG/3ML
3 SOLUTION RESPIRATORY (INHALATION) EVERY 4 HOURS PRN
Status: DISCONTINUED | OUTPATIENT
Start: 2024-07-02 | End: 2024-07-02 | Stop reason: HOSPADM

## 2024-07-02 RX ORDER — HYDROMORPHONE HYDROCHLORIDE 2 MG/ML
0.2 INJECTION, SOLUTION INTRAMUSCULAR; INTRAVENOUS; SUBCUTANEOUS EVERY 5 MIN PRN
Status: DISCONTINUED | OUTPATIENT
Start: 2024-07-02 | End: 2024-07-02 | Stop reason: HOSPADM

## 2024-07-02 RX ORDER — ONDANSETRON HYDROCHLORIDE 2 MG/ML
INJECTION, SOLUTION INTRAVENOUS
Status: DISCONTINUED | OUTPATIENT
Start: 2024-07-02 | End: 2024-07-02

## 2024-07-02 RX ORDER — PROPOFOL 10 MG/ML
VIAL (ML) INTRAVENOUS
Status: DISCONTINUED | OUTPATIENT
Start: 2024-07-02 | End: 2024-07-02

## 2024-07-02 RX ORDER — HYDROCODONE BITARTRATE AND ACETAMINOPHEN 5; 325 MG/1; MG/1
1 TABLET ORAL EVERY 4 HOURS PRN
Qty: 12 TABLET | Refills: 0 | Status: SHIPPED | OUTPATIENT
Start: 2024-07-02

## 2024-07-02 RX ADMIN — DEXAMETHASONE SODIUM PHOSPHATE 4 MG: 4 INJECTION, SOLUTION INTRA-ARTICULAR; INTRALESIONAL; INTRAMUSCULAR; INTRAVENOUS; SOFT TISSUE at 07:07

## 2024-07-02 RX ADMIN — SODIUM CHLORIDE, SODIUM LACTATE, POTASSIUM CHLORIDE, AND CALCIUM CHLORIDE: .6; .31; .03; .02 INJECTION, SOLUTION INTRAVENOUS at 07:07

## 2024-07-02 RX ADMIN — ONDANSETRON 8 MG: 2 INJECTION, SOLUTION INTRAMUSCULAR; INTRAVENOUS at 07:07

## 2024-07-02 RX ADMIN — FENTANYL CITRATE 25 MCG: 50 INJECTION INTRAMUSCULAR; INTRAVENOUS at 07:07

## 2024-07-02 RX ADMIN — KETOROLAC TROMETHAMINE 30 MG: 30 INJECTION, SOLUTION INTRAMUSCULAR; INTRAVENOUS at 08:07

## 2024-07-02 RX ADMIN — CEFAZOLIN SODIUM 2 G: 2 SOLUTION INTRAVENOUS at 07:07

## 2024-07-02 RX ADMIN — PROPOFOL 50 MG: 10 INJECTION, EMULSION INTRAVENOUS at 07:07

## 2024-07-02 RX ADMIN — OXYCODONE HYDROCHLORIDE 5 MG: 5 TABLET ORAL at 08:07

## 2024-07-02 RX ADMIN — MIDAZOLAM HYDROCHLORIDE 2 MG: 1 INJECTION, SOLUTION INTRAMUSCULAR; INTRAVENOUS at 07:07

## 2024-07-02 RX ADMIN — GLYCOPYRROLATE 0.2 MG: 0.2 INJECTION, SOLUTION INTRAMUSCULAR; INTRAVITREAL at 07:07

## 2024-07-02 RX ADMIN — LIDOCAINE HYDROCHLORIDE 50 MG: 20 INJECTION, SOLUTION INTRAVENOUS at 07:07

## 2024-07-02 RX ADMIN — PROPOFOL 100 MCG/KG/MIN: 10 INJECTION, EMULSION INTRAVENOUS at 07:07

## 2024-07-02 NOTE — ANESTHESIA PREPROCEDURE EVALUATION
[facility]  Anesthesia Pre-Operative Evaluation         Patient Name: Maru Camacho  YOB: 1975  MRN: [id number]    SUBJECTIVE:     07/02/2024    Procedure(s) (LRB):  EXCISION, GANGLION CYST, WRIST (Left)    Maru Camacho is a 48 y.o. female here for Procedure(s) (LRB):  EXCISION, GANGLION CYST, WRIST (Left)    Drips:     Patient Active Problem List   Diagnosis    Essential hypertension    Hypertensive crisis    Microcytic anemia    TIA (transient ischemic attack)    Grade I diastolic dysfunction    Hard to intubate    Menorrhagia with regular cycle    Ganglion cyst       Review of patient's allergies indicates:   Allergen Reactions    Shellfish containing products Hives       Current Facility-Administered Medications on File Prior to Encounter   Medication Dose Route Frequency Provider Last Rate Last Admin    lactated ringers infusion   Intravenous Continuous Vilma Briggs DNP   Stopped at 09/13/23 1058    LIDOcaine (PF) 10 mg/ml (1%) injection 10 mg  1 mL Intradermal Once Vilma Briggs DNP         Current Outpatient Medications on File Prior to Encounter   Medication Sig Dispense Refill    amLODIPine (NORVASC) 10 MG tablet Take 10 mg by mouth once daily.      ibuprofen (ADVIL,MOTRIN) 800 MG tablet Take 1 tablet (800 mg total) by mouth every 8 (eight) hours as needed for Pain. (Patient not taking: Reported on 6/5/2024) 60 tablet 2    losartan (COZAAR) 100 MG tablet Take 100 mg by mouth. (Patient not taking: Reported on 6/5/2024)      metroNIDAZOLE (FLAGYL) 500 MG tablet Take 1 tablet (500 mg total) by mouth every 12 (twelve) hours. (Patient not taking: Reported on 6/5/2024) 14 tablet 0    oxyCODONE-acetaminophen (PERCOCET) 5-325 mg per tablet Take 1 tablet by mouth every 4 (four) hours as needed for Pain. (Patient not taking: Reported on 6/5/2024) 30 tablet 0       Past Surgical History:   Procedure Laterality Date    ADENOIDECTOMY      DILATION AND CURETTAGE OF UTERUS       ROBOT-ASSISTED LAPAROSCOPIC HYSTERECTOMY N/A 9/13/2023    Procedure: ROBOTIC HYSTERECTOMY;  Surgeon: Griffin Dailey MD;  Location: [facility] OR;  Service: OB/GYN;  Laterality: N/A;    ROBOT-ASSISTED LAPAROSCOPIC OOPHORECTOMY Right 9/13/2023    Procedure: ROBOTIC OOPHORECTOMY;  Surgeon: Griffin Dailey MD;  Location: [facility] OR;  Service: OB/GYN;  Laterality: Right;    ROBOT-ASSISTED SALPINGECTOMY Bilateral 9/13/2023    Procedure: ROBOTIC SALPINGECTOMY;  Surgeon: Griffin Dailey MD;  Location: [facility] OR;  Service: OB/GYN;  Laterality: Bilateral;  Right Oophorectomy       Social History     Socioeconomic History    Marital status: Single   Tobacco Use    Smoking status: Never    Smokeless tobacco: Never   Substance and Sexual Activity    Alcohol use: No    Drug use: Never    Sexual activity: Yes     Partners: Male     Birth control/protection: None     Social Determinants of Health     Financial Resource Strain: High Risk (6/12/2024)    Overall Financial Resource Strain (CARDIA)     Difficulty of Paying Living Expenses: Very hard   Food Insecurity: Food Insecurity Present (6/12/2024)    Hunger Vital Sign     Worried About Running Out of Food in the Last Year: Sometimes true     Ran Out of Food in the Last Year: Sometimes true   Physical Activity: Unknown (6/12/2024)    Exercise Vital Sign     Days of Exercise per Week: 1 day   Stress: Stress Concern Present (6/12/2024)    [employer] [place] - Occupational Stress Questionnaire     Feeling of Stress : Rather much   Housing Stability: High Risk (6/12/2024)    Housing Stability Vital Sign     Unable to Pay for Housing in the Last Year: Yes         OBJECTIVE:     Vital Signs Range (Last 24H):  Temp:  [36.8 °C (98.2 °F)] 36.8 °C (98.2 °F)  Pulse:  [64] 64  Resp:  [18] 18  SpO2:  [98 %] 98 %  BP: (182)/(94) 182/94    Significant Labs:  Lab Results   Component Value Date    WBC 8.41 07/06/2023    HGB 9.1 (L) 07/06/2023    HCT 32.0 (L) 07/06/2023      07/06/2023    CHOL 150 07/06/2019    TRIG 59 07/06/2019    HDL 50 07/06/2019    ALT 13 07/06/2023    AST 21 07/06/2023     06/13/2024    K 4.1 06/13/2024     06/13/2024    CREATININE 0.63 06/13/2024    BUN 11 06/13/2024    CO2 25 06/13/2024    TSH 2.480 07/06/2023    INR 1.1 07/06/2019    HGBA1C 5.3 07/06/2019       Diagnostic Studies:    EKG:   Results for orders placed or performed during the hospital encounter of 06/13/24   EKG 12-lead    Collection Time: 06/13/24 11:44 AM   Result Value Ref Range    QRS Duration 88 ms    [facility] QTC Calculation 446 ms    Narrative    Test Reason : Z01.818,I10,    Vent. Rate : 062 BPM     Atrial Rate : 062 BPM     P-R Int : 204 ms          QRS Dur : 088 ms      QT Int : 440 ms       P-R-T Axes : 070 032 -07 degrees     QTc Int : 446 ms    Normal sinus rhythm  Normal ECG  When compared with ECG of 29-AUG-2023 09:45,  No significant change was found  Confirmed by Uriel Otero MD ([id number]) on 6/13/2024 4:26:57 PM    Referred By: FALLON LOYOLA           Confirmed By:Uriel Otero MD       2D ECHO:  TTE:  No results found for this or any previous visit.  Results for orders placed or performed during the hospital encounter of 07/06/19   Transthoracic echo (TTE) 2D with Color Flow   Result Value Ref Range    STJ 3.30 cm    AV mean gradient 6 mmHg    Ao peak bobby 1.66 m/s    Ao VTI 38.36 cm    IVS 1.35 (A) 0.6 - 1.1 cm    LA size 4.01 cm    Left Atrium Major Axis 4.97 cm    Left Atrium Minor Axis 4.55 cm    LVIDd 4.43 3.5 - 6.0 cm    LVIDs 3.05 2.1 - 4.0 cm    LVOT diameter 2.33 cm    LVOT peak VTI 30.66 cm    Posterior Wall 1.34 (A) 0.6 - 1.1 cm    MV Peak A Bobby 1.09 m/s    E wave deceleration time 456.85 msec    MV Peak E Bobby 0.71 m/s    PV Peak D Bobby 0.73 m/s    PV Peak S Bobby 0.62 m/s    RA Major Axis 3.53 cm    RVDD 2.52 cm    TR Max Bobby 2.16 m/s    LA WIDTH 4.58 cm    Ao root annulus 3.68 cm    PV PEAK VELOCITY 1.13 cm/s    LV Diastolic Volume 89.00 mL    LV  Systolic Volume 36.53 mL    LVOT peak pacnhito 1.11 m/s    FS 31 %    LA volume 74.16 cm3    LV mass 228.57 g    Left Ventricle Relative Wall Thickness 0.60 cm    AV valve area 3.41 cm2    AV Velocity Ratio 0.67     AV index (prosthetic) 0.80     E/A ratio 0.65     Pulm vein S/D ratio 0.85     LVOT area 4.3 cm2    LVOT stroke volume 130.66 cm3    AV peak gradient 11 mmHg    LV Systolic Volume Index 17.9 mL/m2    LV Diastolic Volume Index 43.66 mL/m2    LA Volume Index 36.4 mL/m2    LV Mass Index 112 g/m2    Triscuspid Valve Regurgitation Peak Gradient 19 mmHg    BSA 2.11 m2    LV LATERAL E/E' RATIO 7.89 m/s    Right Atrial Pressure (from IVC) 3 mmHg    TDI LATERAL 0.09 m/s    TV resting pulmonary artery pressure 22 mmHg    Narrative    · Normal left ventricular systolic function. The estimated ejection   fraction is 55%  · Grade I (mild) indeterminate left ventricular diastolic function left   ventricular diastolic dysfunction consistent with impaired relaxation.  · Mild left atrial enlargement.  · Normal right ventricular systolic function.  · Normal left atrial pressure.  · Mild aortic regurgitation.  · Normal central venous pressure (3 mm Hg).  · The estimated PA systolic pressure is 22 mm Hg          Previous airway:     9/13/2023 8:20 AM     *  Mask Ventilation:  Moderately difficult with oral airway   * Attempts:  3    Method of Intubation:  Video laryngoscopy    Blade:  Bhagat 3    Laryngeal View Grade: Grade III - only epiglottis visible    laryngoscopy    Blade (2nd Attempt):  Bhagat 3    Laryngeal View Grade (2nd Attempt): Grade IIb - only the arytenoids and epiglottis seen      Method of Intubation (3rd Attempt):  Video laryngoscopy    Blade (3rd Attempt):  Bhagat 3    Laryngeal View Grade (3rd Attempt): Grade I - full view of cords      Difficult Airway Encountered?: Yes    *  Future Airway Recommendations:  GLidescope    Complications:  None     Airway Device Size:  7.5     Tube secured:  22    Secured  at:  The lips    Placement Verified By:  Capnometry, Colorimetric ETCO2 device and Revisualization with laryngoscopy    Complicating Factors:  Anterior larynx, short neck, poor neck/head extension and oropharyngeal edema or fat      Pre-op Assessment    I have reviewed the Patient Summary Reports.     I have reviewed the Nursing Notes. I have reviewed the NPO Status.   I have reviewed the Medications.     Review of Systems  Anesthesia Hx:  No problems with previous Anesthesia   History of prior surgery of interest to airway management or planning:          Denies Family Hx of Anesthesia complications.    Denies Personal Hx of Anesthesia complications.                    Social:  Non-Smoker, No Alcohol Use       Cardiovascular:     Hypertension  Denies Valvular problems/Murmurs.  Denies MI.                                   Hypertension         Pulmonary:    Denies COPD.  Denies Asthma.                    Renal/:  Renal/ Normal  Denies Chronic Renal Disease.                Hepatic/GI:      Denies GERD.             Neurological:  TIA,     Seizures           Seizure Disorder              TIA - Transient Ischemic Attack               Endocrine:  Denies Diabetes. Denies Hypothyroidism.  Denies Hyperthyroidism.       Morbid Obesity / BMI > 40      Physical Exam  General: Well nourished, Cooperative, Alert and Oriented    Airway:  Mallampati: II   TM Distance: Normal  Neck ROM: Normal ROM    Dental:  Intact    Chest/Lungs:  Clear to auscultation    Heart:  Rate: Normal  Rhythm: Regular Rhythm        Anesthesia Plan  Type of Anesthesia, risks & benefits discussed:    Anesthesia Type: Gen Natural Airway  Intra-op Monitoring Plan: Standard ASA Monitors  Post Op Pain Control Plan: multimodal analgesia and IV/PO Opioids PRN  Informed Consent: Informed consent signed with the Patient and all parties understand the risks and agree with anesthesia plan.  All questions answered.   ASA Score: 3  Day of Surgery Review of History  & Physical: H&P Update referred to the surgeon/provider.    Ready For Surgery From Anesthesia Perspective.     .

## 2024-07-02 NOTE — OP NOTE
[facility] - [facility] (Hospital)  Operative Note      Date of Procedure: 7/2/2024     Procedure: Procedure(s) (LRB):  EXCISION, GANGLION CYST, WRIST (Left)     Surgeons and Role:     * Connor Esparza Jr., MD - Primary    Assisting Surgeon: None    Pre-Operative Diagnosis: Ganglion cyst [M67.40]    Post-Operative Diagnosis: Post-Op Diagnosis Codes:     * Ganglion cyst [M67.40]    Anesthesia: Local MAC    Operative Findings (including complications, if any):  Ganglion cyst recurrent left wrist    Description of Technical Procedures:     Preop diagnosis: Recurrent ganglion cyst left wrist dorsal.      Postop diagnosis: Same.      Operative procedure:  Excisional biopsy of recurrent ganglion cyst left wrist 4 cm X 3 cm.      Surgeon: Isaias.      First assistant: Jimmy.      Anesthesia:  Mac.      EBL: Minimal.      Specimen: Cyst.      Operative procedure in detail as follows:     After operative consent was obtained patient brought the operating room placed supine operating table.  Anesthesia by IV sedation followed by injection of xylocaine Marcaine combination into the operative site left dorsal wrist.  Tourniquet applied left arm left upper extremity prepped and draped out in the normal sterile fashion.  The Esmarch used to exsanguinated the limb and the tourniquet inflated 225 mmHg.      Following this a dorsal transverse incision made with a 15 blade through the previous surgical scar full-thickness skin flaps raised hemostasis achieved with the Bovie.  Deep dissection used to expose a large multilobulated ganglion cyst that was arising from the scapholunate ligament.  The extensor tendons were all retracted to the side and the cyst was carefully traced down to its capsular attachment.  A capsulectomy was performed and following this removal of the cyst was done by carefully scraping and off of the scapholunate ligament.  Hemostasis achieved with the Bovie.  The ligament was intact.  The wound thoroughly irrigated  with antibiotic saline solution hemostasis achieved with the Bovie subcutaneous tissue closed with 4-0 Monocryl Dermabond and Steri-Strips on the skin.  Sterile dressing applied followed by light wrap.  Tourniquet deflated and the patient brought to the recovery room in stable condition.  All sponge needle counts reported as correct no complications    Significant Surgical Tasks Conducted by the Assistant(s), if Applicable: retraction    Estimated Blood Loss (EBL): * No values recorded between 7/2/2024  7:24 AM and 7/2/2024  7:55 AM *           Implants: * No implants in log *    Specimens:   Specimen (24h ago, onward)      None                    Condition: Good    Disposition: PACU - hemodynamically stable.    Attestation: I was present and scrubbed for the entire procedure.    Discharge Note    OUTCOME: Patient tolerated treatment/procedure well without complication and is now ready for discharge.    DISPOSITION: Home or Self Care    FINAL DIAGNOSIS:  Ganglion cyst    FOLLOWUP: In clinic    DISCHARGE INSTRUCTIONS:    Discharge Procedure Orders   Diet general     Call MD for:  temperature >100.4     Call MD for:  persistent nausea and vomiting     Call MD for:  severe uncontrolled pain     Keep surgical extremity elevated     Remove dressing in 72 hours        Clinical Reference Documents Added to Patient Instructions         Document    GANGLION CYST DISCHARGE INSTRUCTIONS (ENGLISH)

## 2024-07-02 NOTE — DISCHARGE INSTRUCTIONS
After Hand Surgery  After surgery, the better you take care of yourself--especially your hand--the sooner it will heal. Follow your surgeons instructions. Try not to bump your hand, and dont move or lift anything while youre still wearing bandages, a splint, or a cast.  Care for your hand    Keep your hand elevated above heart level as much as possible for the first several days after surgery. This helps reduce swelling and pain.  To help prevent infection and speed healing, take care not to get your cast or bandages wet.  Relieve pain as directed  Your surgeon may prescribe pain medicine or suggest you take an anti-inflammatory medicine. You might also be instructed to apply ice (or another cold source) to your hand. If you use ice cubes, put them in a plastic bag and rest it on top of your bandages. Leave the cold source on your hand for as long as its comfortable. Do this several times a day for the first few days after surgery. It may take several minutes before you can feel the cold through the cast or bandages.  Follow up with your surgeon  During a follow-up visit after surgery, your surgeon will check your progress. The stitches, bandages, splint, or cast may be removed. A new cast or splint may be placed. If your hand has healed enough, your surgeon may prescribe exercises.  Do prescribed hand exercises  Your surgeon may recommend that you do exercises. These may be done under the guidance of a physical or occupational therapist. The exercises strengthen your hand, help you regain flexibility, and restore proper function. Do the exercises as advised.  Call your surgeon if you have...  A fever higher than 100.4°F (38.0°C) taken by mouth  Side effects from your medicine, such as prolonged nausea  A wet or loose dressing, or a dressing that is too tight  Excessive bleeding  Increased, ongoing pain or numbness  Signs of infection (such as drainage, warmth, or redness) at the incision site              ANESTHESIA  -For the first 24 hours after surgery:  Do not drive, use heavy equipment, make important decisions, or drink alcohol  -It is normal to feel sleepy for several hours.  Rest until you are more awake.  -Have someone stay with you, if needed.  They can watch for problems and help keep you safe.  -Some possible post anesthesia side effects include: nausea and vomiting, sore throat and hoarseness, sleepiness, and dizziness.    PAIN  -If you have pain after surgery, pain medicine will help you feel better.  Take it as directed, before pain becomes severe.  Most pain relievers taken by mouth need at least 20-30 minutes to start working.  -Do not drive or drink alcohol while taking pain medicine.  -Pain medication can upset your stomach.  Taking them with a little food may help.  -Other ways to help control pain: elevation, ice, and relaxation  -Call your surgeon if still having unmanageable pain an hour after taking pain medicine.  -Pain medicine can cause constipation.  Taking an over-the counter stool softener while on prescription pain medicine and drinking plenty of fluids can prevent this side effect.  -Call your surgeon if you have severe side effects like: breathing problems, trouble waking up, dizziness, confusion, or severe constipation.    NAUSEA  -Some people have nausea after surgery.  This is often because of anesthesia, pain, pain medicine, or the stress of surgery.  -Do not push yourself to eat.  Start off with clear liquids and soup.  Slowly move to solid foods.  Don't eat fatty, rich, spicy foods at first.  Eat smaller amounts.  -If you develop persistent nausea and vomiting please notify your surgeon immediately.    BLEEDING  -Different types of surgery require different types of care and dressing changes.  It is important to follow all instructions and advice from your surgeon.  Change dressing as directed.  Call your surgeon for any concerns regarding postop bleeding.    SIGNS OF  INFECTION  -Signs of infection include: fever, swelling, drainage, and redness  -Notify your surgeon if you have a fever of 100.4 F (38.0 C) or higher.  -Notify your surgeon if you notice redness, swelling, increased pain, pus, or a foul smell at the incision site.

## 2024-07-02 NOTE — OPERATIVE NOTE ADDENDUM
Certification of Assistant at Surgery       Surgery Date: 7/2/2024     Participating Surgeons:  Surgeons and Role:     * Connor Esparza Jr., MD - Primary    Procedures:  Procedure(s) (LRB):  EXCISION, GANGLION CYST, WRIST (Left)    Assistant Surgeon's Certification of Necessity:  I understand that section 1842 (b) (6) (d) of the Social Security Act generally prohibits Medicare Part B reasonable charge payment for the services of assistants at surgery in teaching hospitals when qualified residents are available to furnish such services. I certify that the services for which payment is claimed were medically necessary, and that no qualified resident was available to perform the services. I further understand that these services are subject to post-payment review by the Medicare carrier.      Pat Marquez PA-C    07/02/2024  7:56 AM

## 2024-07-02 NOTE — PLAN OF CARE
Received from OR per stretcher. Monitor applied. VSS. Rouses to voice. See flow sheets.  Family updated per Text system. PO fluids served. Assisted up to BR to void.

## 2024-07-02 NOTE — ANESTHESIA POSTPROCEDURE EVALUATION
Anesthesia Post Evaluation    Patient: Maru Camacho    Procedure(s) Performed: Procedure(s) (LRB):  EXCISION, GANGLION CYST, WRIST (Left)    Final Anesthesia Type: general      Patient location during evaluation: PACU  Patient participation: Yes- Able to Participate  Level of consciousness: awake  Post-procedure vital signs: reviewed and stable  Pain management: adequate  Airway patency: patent    PONV status at discharge: No PONV  Anesthetic complications: no      Cardiovascular status: blood pressure returned to baseline, hypertensive and stable  Respiratory status: unassisted  Hydration status: euvolemic  Follow-up not needed.              Vitals Value Taken Time   /74 07/02/24 0845   Temp 36.8 °C (98.2 °F) 07/02/24 0845   Pulse 82 07/02/24 0845   Resp 18 07/02/24 0845   SpO2 98 % 07/02/24 0845         No case tracking events are documented in the log.      Pain/[place] Score: Pain Rating Prior to Med Admin: 8 (7/2/2024  8:19 AM)  Pain Rating Post Med Admin: 6 (7/2/2024  8:45 AM)  S[place]ia Score: 10 (7/2/2024  8:45 AM)

## 2024-07-08 ENCOUNTER — TELEPHONE (OUTPATIENT)
Dept: ORTHOPEDICS | Facility: CLINIC | Age: 49
End: 2024-07-08
Payer: COMMERCIAL

## 2024-07-08 NOTE — TELEPHONE ENCOUNTER
Attempted to return pt callback about removing bandages. Pt did not answer left a VM ----- Message from [employer] sent at 7/8/2024  1:30 PM CDT -----  Contact: pt  Type:  Same Day Appointment Request    Caller is requesting a same day appointment.  Caller declined first available appointment listed below.    Name of Caller:pt   When is the first available appointment? N/a  Symptoms:hand is swollen.   Best Call Back Number: [phone]  Additional Information: not sure if she should remove the bandages

## 2024-07-08 NOTE — TELEPHONE ENCOUNTER
----- Message from Santiago Gomez sent at 7/8/2024  2:03 PM CDT -----  Type:  Needs Medical Advice    Who Called: pt  Symptoms (please be specific): swollen hand after removing bandages    How long has patient had these symptoms:  Wednesday 07/03  [id number] [address] [facility] [address]   Phone: [phone]        Would the patient rather a call back or a response via [facility]? Call   Best Call Back Number:  [phone]  Additional Information:

## 2024-07-08 NOTE — TELEPHONE ENCOUNTER
----- Message from Heather Cheung sent at 7/8/2024 12:39 PM CDT -----  Type:  Sooner Apoointment Request    Caller is requesting a sooner appointment.  Caller declined first available appointment listed below.  Caller will not accept being placed on the waitlist and is requesting a message be sent to doctor.  Name of Caller:pt  When is the first available appointment?Nel[address] 15  Symptoms:hand is now swollen after procedure /painful   Would the patient rather a call back or a response via [facility]? Call   Best Call Back Number:[phone]  Additional Information:

## 2024-07-08 NOTE — TELEPHONE ENCOUNTER
Left message returning patients call   Quality 226: Preventive Care And Screening: Tobacco Use: Screening And Cessation Intervention: Patient screened for tobacco use and is an ex/non-smoker Detail Level: Detailed Quality 130: Documentation Of Current Medications In The Medical Record: Current Medications Documented Quality 47: Advance Care Plan: Advance care planning not documented, reason not otherwise specified. Quality 431: Preventive Care And Screening: Unhealthy Alcohol Use - Screening: Patient not identified as an unhealthy alcohol user when screened for unhealthy alcohol use using a systematic screening method Quality 111:Pneumonia Vaccination Status For Older Adults: Patient did not receive any pneumococcal conjugate or polysaccharide vaccine on or after their 60th birthday and before the end of the measurement period Quality 110: Preventive Care And Screening: Influenza Immunization: Influenza Immunization previously received during influenza season

## 2024-07-08 NOTE — TELEPHONE ENCOUNTER
Spoke with patient. Informed her that she will be accessed to see when she will be able to return to work. I also advised patient to not submerge hand in water. Patient also stated that she has some swelling which I did inform her, that it is normal to have swelling and bruising after surgery, to keep hand elevated, apply ice and to take ibuprofen along with pain  medication to help with any pain or swelling. Patient verbalized understanding

## 2024-07-10 LAB
FINAL PATHOLOGIC DIAGNOSIS: NORMAL
GROSS: NORMAL
Lab: NORMAL
MICROSCOPIC EXAM: NORMAL

## 2024-07-15 ENCOUNTER — OFFICE VISIT (OUTPATIENT)
Dept: ORTHOPEDICS | Facility: CLINIC | Age: 49
End: 2024-07-15

## 2024-07-15 DIAGNOSIS — M67.432 GANGLION CYST OF WRIST, LEFT: Primary | ICD-10-CM

## 2024-07-15 PROCEDURE — 99024 POSTOP FOLLOW-UP VISIT: CPT | Mod: ,,,

## 2024-07-15 PROCEDURE — 99999 PR PBB SHADOW E&M-EST. PATIENT-LVL III: CPT | Mod: PBBFAC,,,

## 2024-07-15 PROCEDURE — 99213 OFFICE O/P EST LOW 20 MIN: CPT | Mod: PBBFAC,PN

## 2024-07-15 NOTE — LETTER
July 15, 2024      [facility] - Orthopedics  [address]  [address]  [place] [address]  Phone: [phone]  Fax: [phone]       Patient: Maru Camacho   YOB: 1975  Date of Visit: 07/15/2024    To Whom It May Concern:    Nando Camacho  was at [facility] on 07/15/2024. The patient may return to work on 07/22/2024 with no restrictions. If you have any questions or concerns, or if I can be of further assistance, please do not hesitate to contact me.    Sincerely,      Nohemy Valencia PA-C

## 2024-07-15 NOTE — PROGRESS NOTES
Subjective:      Patient ID: Maru Camacho is a 48 y.o. female.    Chief Complaint: Post-op Follow Up    HPI: Maru Camacho returns for a 2 week post-op visit following:  left wrist ganglion cyst excision (date of surgery 07/02/2024) with Dr. Esparza. Overall, the patient is doing well with no complaints of fever, chills, nausea, vomiting, SOB, chest pains, or drainage to surgical incision. The patient reports that pain has been managed with medication. She has not begun formal PT/OT yet, but maintains compliance with activity restrictions. Post-operative complaints include: swelling and decreased ROM.    Intraoperative Pathology:  Ganglion cyst    PAST MEDICAL HISTORY:    Past Medical History:   Diagnosis Date    Hypertension     Seizures      MEDICATIONS:   Current Outpatient Medications:     amLODIPine (NORVASC) 10 MG tablet, Take 10 mg by mouth once daily., Disp: , Rfl:     HYDROcodone-acetaminophen (NORCO) 5-325 mg per tablet, Take 1 tablet by mouth every 4 (four) hours as needed for Pain., Disp: 12 tablet, Rfl: 0    ibuprofen (ADVIL,MOTRIN) 800 MG tablet, Take 1 tablet (800 mg total) by mouth every 8 (eight) hours as needed for Pain., Disp: 60 tablet, Rfl: 2    losartan (COZAAR) 100 MG tablet, Take 100 mg by mouth., Disp: , Rfl:     metroNIDAZOLE (FLAGYL) 500 MG tablet, Take 1 tablet (500 mg total) by mouth every 12 (twelve) hours. (Patient not taking: Reported on 6/5/2024), Disp: 14 tablet, Rfl: 0    oxyCODONE-acetaminophen (PERCOCET) 5-325 mg per tablet, Take 1 tablet by mouth every 4 (four) hours as needed for Pain., Disp: 30 tablet, Rfl: 0  No current facility-administered medications for this visit.    Facility-Administered Medications Ordered in Other Visits:     lactated ringers infusion, , Intravenous, Continuous, Vilma Briggs DNP, Stopped at 09/13/23 1058    LIDOcaine (PF) 10 mg/ml (1%) injection 10 mg, 1 mL, Intradermal, Once, Vilma Briggs DNP    ALLERGIES:   Review of patient's allergies  indicates:   Allergen Reactions    Shellfish containing products Hives       Review of Systems:  Constitution: Negative for chills, fever and night sweats.   HENT: Negative for congestion and headaches.    Eyes: Negative for blurred vision or vision loss.  Cardiovascular: Negative for chest pain and syncope.   Respiratory: Negative for cough and shortness of breath.    Endocrine: Negative for polydipsia, polyphagia and polyuria.   Hematologic/Lymphatic: Negative for bleeding problem. Does not bruise/bleed easily.   Skin: Negative for dry skin, itching and rash.   Musculoskeletal: See HPI.   Gastrointestinal: Negative for abdominal pain and bowel incontinence.   Genitourinary: Negative for bladder incontinence and nocturia.   Neurological: Negative for disturbances in coordination, loss of balance and seizures.   Psychiatric/Behavioral: Negative for depression. The patient does not have insomnia.    Allergic/Immunologic: Negative for hives and persistent infections.        Objective:      There were no vitals filed for this visit.    PHYSICAL EXAM:  General: Alert & oriented x3, well-developed and well-nourished, in no acute distress, sitting comfortably in the exam room.  Skin: Warm and dry. Capillary refill less than 2 seconds.   Head: Normocephalic and atraumatic.   Eyes: Sclera appear normal.   Nose: No deformities seen.   Ears: No deformities seen.   Neck: No tracheal deviation present.   Pulmonary/Chest: Breathing unlabored.   Neurological: Alert and oriented to person, place, and time.   Psychiatric: Mood is pleasant and affect appropriate.       LEFT HAND/WRIST:        Observation/Inspection:    Surgical wound margins are well approximated and healing nicely.    No redness, warmth, drainage, or other signs of infection.  Moderate hand swelling.         Range of Motion:   Wrist and fingers ROM intact with some stiffness due to soreness and swelling.        Palpation:   Tenderness to palpation to the dorsal  wrist.   Otherwise, negative tenderness to palpation to bony prominences and soft tissues throughout.        Strength:    strength not tested today.        Neurovascular Exam:  Digits warm and well perfused, brisk capillary refill <3 seconds throughout  Sensation intact to finger pads.   NVI motor/LTS to median, radial, and ulnar nerves; radial pulse 2+      Imaging:  None today.         Assessment:       1. Ganglion cyst of wrist, left        Plan:          2 weeks s/p left wrist ganglion cyst excision    Overall patient is doing well post-operatively.    I explained to the patient's swelling is normal postoperatively.  I encouraged patient elevate hand, apply ice, and take NSAIDs.    Wound Care:  Regular wound care explained to the patient.  Okay to wash incision with soap and water and pat to dry.    Medications:  Continue with OTC Tylenol and/or Ibuprofen as needed for pain.   Antibiotics:  None prescribed today.  No evidence of wound infection.  Occupational Therapy:  Ambulatory referral to occupational therapy for hand/wrist ROM, stretching, strengthening, and conditioning.  Activity Modifications:  Limit to 10 pounds of force with operative hand x 1 week.  Okay to advance activities as tolerated thereafter.  Pain Management: Ice compress to the affected area 2-3x a day for 15-20 minutes as needed for pain management.  Work Status: Work note provided today with patient returning to work on 07/22/2024.     Follow-Up:  4 weeks with Nohemy Valencia PA-C for 2nd post-op visit.     All of the patient's questions were answered and the patient will contact us if they have any questions or concerns in the interim.    Nohemy Valencia PA-C  [facility]  Orthopedic Surgery    Medical Dictation software was used during the dictation of portions or the entirety of this medical record.  Phonetic or grammatic errors may exist due to the use of this software. For clarification, refer to the author of the  document.

## 2024-08-09 NOTE — PROGRESS NOTES
Subjective:      Patient ID: Maru Camacho is a 48 y.o. female.    Chief Complaint: Post-op Follow Up    HPI: Maru Camacho returns for a 6 week post-op visit following:  left wrist dorsal ganglion cyst excision (date of surgery 07/02/2024) with Dr. Esparza. Overall, the patient is doing well with no complaints of fever, chills, nausea, vomiting, SOB, chest pains, or drainage to surgical incision. The patient reports that pain has been managed without the need of medication.  Last visit, OT was ordered but this was never scheduled. Patient has been performing gentle hand/wrist ROM HEP.  Patient reports compliance with activity restrictions. Post-operative complaints include: decreased ROM with wrist extension.     Intraoperative Pathology:  Ganglion cyst    PAST MEDICAL HISTORY:    Past Medical History:   Diagnosis Date    Hypertension     Seizures      MEDICATIONS:   Current Outpatient Medications:     amLODIPine (NORVASC) 10 MG tablet, Take 10 mg by mouth once daily., Disp: , Rfl:     HYDROcodone-acetaminophen (NORCO) 5-325 mg per tablet, Take 1 tablet by mouth every 4 (four) hours as needed for Pain., Disp: 12 tablet, Rfl: 0    ibuprofen (ADVIL,MOTRIN) 800 MG tablet, Take 1 tablet (800 mg total) by mouth every 8 (eight) hours as needed for Pain., Disp: 60 tablet, Rfl: 2    losartan (COZAAR) 100 MG tablet, Take 100 mg by mouth., Disp: , Rfl:     metroNIDAZOLE (FLAGYL) 500 MG tablet, Take 1 tablet (500 mg total) by mouth every 12 (twelve) hours. (Patient not taking: Reported on 6/5/2024), Disp: 14 tablet, Rfl: 0    oxyCODONE-acetaminophen (PERCOCET) 5-325 mg per tablet, Take 1 tablet by mouth every 4 (four) hours as needed for Pain., Disp: 30 tablet, Rfl: 0  No current facility-administered medications for this visit.    Facility-Administered Medications Ordered in Other Visits:     lactated ringers infusion, , Intravenous, Continuous, Vilma Briggs DNP, Stopped at 09/13/23 1058    LIDOcaine (PF) 10 mg/ml (1%)  injection 10 mg, 1 mL, Intradermal, Once, Briggs Sharearabella, DNP    ALLERGIES:   Review of patient's allergies indicates:   Allergen Reactions    Shellfish containing products Hives       Review of Systems:  Constitution: Negative for chills, fever and night sweats.   HENT: Negative for congestion and headaches.    Eyes: Negative for blurred vision or vision loss.  Cardiovascular: Negative for chest pain and syncope.   Respiratory: Negative for cough and shortness of breath.    Endocrine: Negative for polydipsia, polyphagia and polyuria.   Hematologic/Lymphatic: Negative for bleeding problem. Does not bruise/bleed easily.   Skin: Negative for dry skin, itching and rash.   Musculoskeletal: See HPI.   Gastrointestinal: Negative for abdominal pain and bowel incontinence.   Genitourinary: Negative for bladder incontinence and nocturia.   Neurological: Negative for disturbances in coordination, loss of balance and seizures.   Psychiatric/Behavioral: Negative for depression. The patient does not have insomnia.    Allergic/Immunologic: Negative for hives and persistent infections.        Objective:      There were no vitals filed for this visit.    PHYSICAL EXAM:  General: Alert & oriented x3, well-developed and well-nourished, in no acute distress, sitting comfortably in the exam room.  Skin: Warm and dry. Capillary refill less than 2 seconds.   Head: Normocephalic and atraumatic.   Eyes: Sclera appear normal.   Nose: No deformities seen.   Ears: No deformities seen.   Neck: No tracheal deviation present.   Pulmonary/Chest: Breathing unlabored.   Neurological: Alert and oriented to person, place, and time.   Psychiatric: Mood is pleasant and affect appropriate.       LEFT HAND/WRIST:        Observation/Inspection:    Surgical incision is well healed with appropriate scar formation.  No redness, warmth, drainage, or other signs of infection.  Minimal hand swelling.        Range of Motion:   Wrist: Limited to wrist extension  due to pain.   Full to wrist flexion radial, and ulnar deviation without pain or difficulty.  Digits: Full to digit MCP, PIP, and DIP flexion and extension without pain or difficulty.        Palpation:   No tenderness to palpation to bony prominences and soft tissues throughout.        Strength:    strength not tested today.        Neurovascular Exam:  Digits warm and well perfused, brisk capillary refill <3 seconds throughout  Sensation intact to finger pads.   NVI motor/LTS to median, radial, and ulnar nerves; radial pulse 2+    Imaging:  None today.         Assessment:       1. Ganglion cyst of wrist, left    2. Decreased range of motion of left wrist        Plan:       Orders Placed This Encounter    Ambulatory referral/consult to Physical/Occupational Therapy     6 weeks s/p left wrist ganglion cyst excision    Overall patient is doing well post-operatively.    Wound Care:  Continue with regular wound care.   Medications:  Continue with OTC Tylenol and/or Ibuprofen as needed for pain.   Antibiotics:  None prescribed today.  No evidence of wound infection.  Occupational Therapy:  New referral to occupational therapy for hand/wrist ROM, stretching, strengthening, and conditioning.  HEP:  Continue to perform hand and wrist gentle ROM HEP daily.  Activity Modifications:  Continue to advance activities as tolerated.  Pain Management: Continue ice compress to the affected area 2-3x a day for 15-20 minutes as needed for pain management.  Work Status: Work note provided today for patient to return to work.     Follow-Up: 6 weeks with Nohemy Valencia PA-C for 3rd post-op visit.     All of the patient's questions were answered and the patient will contact us if they have any questions or concerns in the interim.    Nohemy Valencia PA-C  [facility]  Orthopedic Surgery    Medical Dictation software was used during the dictation of portions or the entirety of this medical record.  Phonetic or grammatic errors  may exist due to the use of this software. For clarification, refer to the author of the document.

## 2024-08-12 ENCOUNTER — OFFICE VISIT (OUTPATIENT)
Dept: ORTHOPEDICS | Facility: CLINIC | Age: 49
End: 2024-08-12
Payer: MEDICAID

## 2024-08-12 DIAGNOSIS — M25.632 DECREASED RANGE OF MOTION OF LEFT WRIST: ICD-10-CM

## 2024-08-12 DIAGNOSIS — M67.432 GANGLION CYST OF WRIST, LEFT: Primary | ICD-10-CM

## 2024-08-12 PROCEDURE — 99024 POSTOP FOLLOW-UP VISIT: CPT | Mod: ,,,

## 2024-08-12 PROCEDURE — 99213 OFFICE O/P EST LOW 20 MIN: CPT | Mod: PBBFAC,PN

## 2024-08-12 PROCEDURE — 99999 PR PBB SHADOW E&M-EST. PATIENT-LVL III: CPT | Mod: PBBFAC,,,

## 2024-08-12 NOTE — LETTER
August 12, 2024      [facility] - Orthopedics  [address]  [address]  [place] [address]  Phone: [phone]  Fax: [phone]       Patient: Maru Camacho   YOB: 1975  Date of Visit: 08/12/2024    To Whom It May Concern:    Nando Camacho  was at [facility] on 08/12/2024. The patient may return to work on 08/12/2024 with no restrictions. If you have any questions or concerns, or if I can be of further assistance, please do not hesitate to contact me.    Sincerely,        Nohemy Valencia PA-C

## 2024-08-21 ENCOUNTER — CLINICAL SUPPORT (OUTPATIENT)
Dept: REHABILITATION | Facility: HOSPITAL | Age: 49
End: 2024-08-21
Payer: MEDICAID

## 2024-08-21 DIAGNOSIS — M25.632 DECREASED RANGE OF MOTION OF LEFT WRIST: ICD-10-CM

## 2024-08-21 DIAGNOSIS — M79.642 PAIN IN LEFT HAND: ICD-10-CM

## 2024-08-21 DIAGNOSIS — M67.432 GANGLION CYST OF WRIST, LEFT: ICD-10-CM

## 2024-08-21 DIAGNOSIS — M25.642 STIFFNESS OF LEFT HAND JOINT: ICD-10-CM

## 2024-08-21 DIAGNOSIS — R53.1 WEAKNESS: Primary | ICD-10-CM

## 2024-08-21 PROCEDURE — 97165 OT EVAL LOW COMPLEX 30 MIN: CPT | Mod: PO

## 2024-08-21 PROCEDURE — 97530 THERAPEUTIC ACTIVITIES: CPT | Mod: PO

## 2024-08-21 NOTE — PLAN OF CARE
"  [facility] Occupational Therapy  Initial Evaluation     Date: 8/21/2024  Patient: Maru Camacho  Chart Number: [id number]  Referring Physician: Nohemy Valencia P*  Therapy Diagnosis:   1. Weakness        2. Ganglion cyst of wrist, left  Ambulatory referral/consult to Physical/Occupational Therapy      3. Decreased range of motion of left wrist  Ambulatory referral/consult to Physical/Occupational Therapy      4. Stiffness of left hand joint        5. Pain in left hand            Physician Orders: OT eval/treat   Medical Diagnosis:   Ganglion cyst of wrist, left  - Primary       Decreased range of motion of left wrist        Evaluation Date: 8/21/2024  Plan of Care Certification Date: 10/18/24  Authorization Period: 8/12/24 - 12/31/24  Surgery Date and Procedure: Procedure(s) (LRB):  EXCISION, GANGLION CYST, WRIST (Left) 7/2/24  Date of Return to MD: 9/23/24    Visit #: 1 of 1  Time In: 1500  Time Out: 1600  Total Billable Time: 60    Precautions: Standard ,    Subjective     History of Current Condition: Pt states that she has had a cyst on her wrist since last August 2023. It became so big that it was causing pain to radiate up her middle and ring finger. Pt underwent excision of cyst on 7/2/24.     Involved Side: L  Dominant Side: R   Date of Onset: August 2023  Imaging: reviewed   Previous Therapy: N    Patient's Goals for Therapy: " To get full range of motion back in her wrist"     Pain:  Functional Pain Scale Rating 0-10:   1/10 on average  1/10 at best  1/10 at worst  Location: dorsal palm  Description: aching  Aggravating Factors: turning steering wheel, extension of wrist   Easing Factors: rest     Occupation:  [employer]  Working presently: employed  Duties: fire watch    Functional Limitations/Social History:    Prior Level of Function: Independent  Current Level of Function:Modified Independent    Home/Living environment : lives with their family  Home Access: Pt lives in a [facility]  DME: " none     Leisure: n/a    Driving: Y    Past Medical History/Physical Systems Review:   Maru Camacho  has a past medical history of Hypertension and Seizures.    Maru Camacho  has a past surgical history that includes Dilation and curettage of uterus; Adenoidectomy; Robot-assisted laparoscopic hysterectomy (N/A, 9/13/2023); Robot-assisted salpingectomy (Bilateral, 9/13/2023); Robot-assisted laparoscopic oophorectomy (Right, 9/13/2023); and Excision of ganglion of wrist (Left, 7/2/2024).    Maru has a current medication list which includes the following prescription(s): amlodipine, hydrocodone-acetaminophen, ibuprofen, losartan, metronidazole, and oxycodone-acetaminophen, and the following Facility-Administered Medications: lactated ringers and lidocaine (pf) 10 mg/ml (1%).    Review of patient's allergies indicates:   Allergen Reactions    Shellfish containing products Hives          Objective     Mental status: alert, oriented x3    Observation:   Pt has an incision that is closed on the dorsum of her hand.       Sensation:Pt denies any numbness or tingling      Range of Motion:   Left      Wrist Left  8/21/2024 Right  8/21/2024   Extension 50 60   Flexion 50 60   Ulnar Deviation 40 40   Radial Deviation 20 30      Strength: (ALESSANDRA Dynamometer in psi.)      8/21/2024 8/21/2024    Left Right   Rung II 50 60       Pinch Strength (Measured in psi)     8/21/2024 8/21/2024    Left Right   Key Pinch 13  13    3pt Pinch 10  11    2pt Pinch 6  8        CMS Impairment/Limitation/Restriction for FOTO Hand/Wrist/Finger Survey    Therapist reviewed FOTO scores for Maru Camacho on 8/21/2024.   FOTO documents entered into [employer] - see Media section.    Intake Score: Will complete at 1st after  Category: Self Care         Treatment     Treatment Time In: 1545  Treatment Time Out: 1600  Total Treatment time separate from Evaluation time:15    Maru participated in dynamic functional therapeutic activities to improve  functional performance for 15  minutes, including:  -demonstration and explanation of HEP    Home Exercise Program/Education:  Issued HEP (see patient instructions in EMR) and educated on modality use for pain management . Exercises were reviewed and Maru was able to demonstrate them prior to the end of the session.   Pt received a written copy of exercises to perform at home. Maru demonstrated good  understanding of the education provided.  Pt was advised to perform these exercises free of pain, and to stop performing them if pain occurs.    Patient/Family Education: role of OT, goals for OT, scheduling/cancellations - pt verbalized understanding. Discussed insurance limitations with patient.      Assessment     Maru Camacho is a 48 y.o. female presents with limitations as described in problem list. Patient can benefit from Occupational Therapy services for Iontophoresis, ultrasound, moist heat, therapeutic exercises, home exercise program provied with written instructions, ice and strengthening and orthotics, if deemed necessary . The following goals were discussed with the patient and she is in agreement with them as to be addressed in the treatment plan.    The patient's rehab potential is Good.     Anticipated barriers to occupational therapy: none   Pt has no cultural, educational or language barriers to learning provided.    Profile and History Assessment of Occupational Performance Level of Clinical Decision Making Complexity Score   Occupational Profile:   Maru Camacho is a 48 y.o. female who is currently employed Maru Camacho has difficulty with  ADLs and IADLs as listed previously, which  affecting his/her daily functional abilities.      Comorbidities:    has a past medical history of Hypertension and Seizures.    Medical and Therapy History Review:   Expanded               Performance Deficits    Physical:  Joint Mobility  Joint Stability   Strength  Pinch Strength  Pain    Cognitive:  No  Deficits    Psychosocial:    Habits  Routines  Rituals     Clinical Decision Making:  low    Assessment Process:  Problem-Focused Assessments    Modification/Need for Assistance:  Minimal-Moderate Modifications/Assistance    Intervention Selection:  Several Treatment Options       low  Based on PMHX, co morbidities , data from assessments and functional level of assistance required with task and clinical presentation directly impacting function.         Goals:    LTG's (8 weeks):  1)   Increase ROM to 60 degrees in wrist extension to increase functional hand use for ADLs, IADLs, and work  2)   Increase ROM to 60 degrees in wrist flexion to increase functional hand use for ADLs, IADLs, and work  3)   Increase  strength to 60 lbs. to grasp for ADLs, IADLs, and work tasks  4)   Decrease complaints of pain to  1 out of 10 at worst to increase functional hand use for ADL/work/leisure activities.  5)   Patient to score expected percentage for discharge or more on FOTO to demonstrate improved perception of functional hand use.  6)   Pt will return to near to prior level of function for ADLs and household management reporting I or Mod I with ADLs (dressing, feeding, grooming, toileting).     STG's (4 weeks)  1)   Patient to be IND with HEP and modalities for pain/edema managment.  2)   Increase ROM to 60 degrees in wrist extension to increase functional hand use for ADLs, IADLs, and work  3)   Increase ROM to 60 degrees in wrist flexion to increase functional hand use for ADLs, IADLs, and work  4)   Increase  strength to 60 lbs. to grasp for ADLs, IADLs, and work tasks  5)   Patient to be IND wiht Orthotic use, wear and care precautions.   6)   Decrease complaints of pain to  1 out of 10 at worst to increase functional hand use for ADL/work/leisure activities.      Plan     Pt to be treated by Occupational Therapy 2 times per week for 8 weeks during the certification period from 8/21/2024 to 10/18/24 to achieve the  established goals.     Treatment to include: Paraffin, Fluidotherapy, Manual therapy/joint mobilizations, Modalities for pain management, US 3 mhz, Therapeutic exercises/activities., Iontophoresis with 2.0 cc Dexamethasone, Strengthening, Orthotic Fabrication/Fit/Training, Edema Control, Scar Management, Wound Care, Electrical Modalities, Joint Protection, and Energy Conservation, as well as any other treatments deemed necessary based on the patient's needs or progress.     Moira Garcia, OT

## 2024-08-21 NOTE — PATIENT INSTRUCTIONS
"[facility], OCCUPATIONAL THERAPY  HOME EXERCISE PROGRAM     Complete the following strengthening exercises 2x/day.  Hold each position x3 seconds then relax. Complete 10 repetitions each.     Resisted Wrist Extension   With forearm resting palm down, resist upward movement   of hand with other hand.       Resisted Wrist Flexion   With forearm resting palm up, resist upward movement   of hand with other hand.       Resisted Wrist Radial Deviation  With forearm resting with thumb up, use other hand to   resist upward movement of hand at wrist.       Resisted Wrist Ulnar Deviation  With forearm resting thumb up, use other hand to resist   downward movement of hand at wrist.      AROM: Wrist Flexion / Extension               Bend your wrist forward and back as far as possible.          AROM: Wrist Radial / Ulnar Deviation  Bend your wrist from side to side as far as possible.        AROM: Isolated MCP Flexion / Extension ("Wave")   Bend only your large, bottom knuckles. Hold 3 seconds.   Keep the tips of your fingers straight. Straighten fingers.      AROM: Isolated IPJ Flexion / Extension ("Hook")   Bend only your middle and end knuckles. Hold 3 seconds.   Straighten your fingers.       AROM: MCP and PIP Flexion / Extension ("Straight Fist")  Bend your bottom and middle knuckles, keeping the tips of your fingers straight.   Try to touch the pads of your fingers on your palm. Hold 3 seconds.   Straighten your fingers.       AROM: Composite Flexion / Extension ("Full Fist")  Bend every joint in your hand into a fist. Hold 3 seconds.   Straighten your fingers.         "

## 2024-08-28 ENCOUNTER — CLINICAL SUPPORT (OUTPATIENT)
Dept: REHABILITATION | Facility: HOSPITAL | Age: 49
End: 2024-08-28
Payer: MEDICAID

## 2024-08-28 DIAGNOSIS — M79.642 PAIN IN LEFT HAND: ICD-10-CM

## 2024-08-28 DIAGNOSIS — M25.642 STIFFNESS OF LEFT HAND JOINT: ICD-10-CM

## 2024-08-28 DIAGNOSIS — R53.1 WEAKNESS: Primary | ICD-10-CM

## 2024-08-28 PROCEDURE — 97530 THERAPEUTIC ACTIVITIES: CPT | Mod: PO

## 2024-08-28 NOTE — PROGRESS NOTES
Occupational Therapy Daily Treatment Note     Name: Maru Camacho  Clinic Number: [id number]    Therapy Diagnosis:   Encounter Diagnoses   Name Primary?    Weakness Yes    Stiffness of left hand joint     Pain in left hand      Physician: Nohemy Valencia P*    Visit Date: 8/28/2024       Physician Orders: OT eval/treat   Medical Diagnosis:   Ganglion cyst of wrist, left  - Primary       Decreased range of motion of left wrist        Evaluation Date: 8/21/2024  Plan of Care Certification Date: 10/18/24  Authorization Period: 8/12/24 - 12/31/24  Surgery Date and Procedure: Procedure(s) (LRB):  EXCISION, GANGLION CYST, WRIST (Left) 7/2/24  Date of Return to MD: 9/23/24  Onset Date:  Visit # / Visits authorized: 1 / 20  FOTO Completion: 1/3    Time In:1500  Time Out: 1600  Total Billable Time: 60 minutes    Precautions:  Standard      Subjective     Pt reports: She hasn't had pain in her wrist   she was compliant with home exercise program given last session.   Response to previous treatment:1st after   Functional change: 1st after     Pain: 1/10  Location: left hands      Objective       Mental status: alert, oriented x3     Observation:   Pt has an incision that is closed on the dorsum of her hand.         Sensation:Pt denies any numbness or tingling        Range of Motion:   Left        Wrist Left  8/21/2024 Right  8/21/2024   Extension 50 60   Flexion 50 60   Ulnar Deviation 40 40   Radial Deviation 20 30       Strength: (ALESSANDRA Dynamometer in psi.)        8/21/2024 8/21/2024     Left Right   Rung II 50 60         Pinch Strength (Measured in psi)       8/21/2024 8/21/2024     Left Right   Key Pinch 13  13    3pt Pinch 10  11    2pt Pinch 6  8          CMS Impairment/Limitation/Restriction for FOTO Hand/Wrist/Finger Survey     Therapist reviewed FOTO scores for Maru Camacho on 8/21/2024.   FOTO documents entered into [employer] - see Media section.     Intake Score: Will complete at 1st after  Category: Self  Care           Treatment       Maru received the following direct contact modalities after being cleared for contraindications for 16 minutes:  -paraffin wax and moist hot pack to decrease stiffness   - ultrasound: 1.0 w/cm2, 3.3mhz, 50%    Maru received the following manual therapy techniques for 15 minutes:   -PROM of wrist in all directions  - joint mobilizations of wrist in flex/ext and pro/sup  - IASTM to scar dorsal palm at incision site       Maru participated in dynamic functional therapeutic activities to improve functional performance for 29  minutes, including:  -wrist maze x 3 minutes  -isospheres x 3 min  - wrist wheel 2 min flex/2 min ext  - red flex bar smiles/frowns   - pom pom  with gripper on the 2nd rung    Home Exercises and Education Provided     Education provided:   - Progress towards goals     Written Home Exercises Provided: Patient instructed to cont prior HEP.  Exercises were reviewed and Maru was able to demonstrate them prior to the end of the session.  Maru demonstrated good  understanding of the HEP provided.   .   See EMR under Patient Instructions for exercises provided prior visit.        Assessment     Pt would continue to benefit from skilled OT. Pt tolerated added therapeutic activities well without additional complaints of pain. Pt is motivated. Will progress as tolerated     Maru is progressing well towards her goals and there are no updates to goals at this time. Pt prognosis is Good.     Pt will continue to benefit from skilled outpatient occupational therapy to address the deficits listed in the problem list on initial evaluation provide pt/family education and to maximize pt's level of independence in the home and community environment.     Anticipated barriers to occupational therapy: none at this time     Pt's spiritual, cultural and educational needs considered and pt agreeable to plan of care and goals.      Goals:    LTG's (8 weeks):  1)   Increase ROM to 60  degrees in wrist extension to increase functional hand use for ADLs, IADLs, and work Ongoing  2)   Increase ROM to 60 degrees in wrist flexion to increase functional hand use for ADLs, IADLs, and work Ongoing  3)   Increase  strength to 60 lbs. to grasp for ADLs, IADLs, and work  Ongoing  4)   Decrease complaints of pain to  1 out of 10 at worst to increase functional hand use for ADL/work/leisure activities.Ongoing  5)   Patient to score expected percentage for discharge or more on FOTO to demonstrate improved perception of functional hand use.Ongoing  6)   Pt will return to near to prior level of function for ADLs and household management reporting I or Mod I with ADLs (dressing, feeding, grooming, toileting).      STG's (4 weeks)  1)   Patient to be IND with HEP and modalities for pain/edema managment.Ongoing  2)   Increase ROM to 60 degrees in wrist extension to increase functional hand use for ADLs, IADLs, and workOngoing  3)   Increase ROM to 60 degrees in wrist flexion to increase functional hand use for ADLs, IADLs, and workOngoing  4)   Increase  strength to 60 lbs. to grasp for ADLs, IADLs, and work tasksOngoing  5)   Patient to be IND wiht Orthotic use, wear and care precautions. Ongoing  6)   Decrease complaints of pain to  1 out of 10 at worst to increase functional hand use for ADL/work/leisure activities.Ongoing        Plan      Pt to be treated by Occupational Therapy 2 times per week for 8 weeks during the certification period from 8/21/2024 to 10/18/24 to achieve the established goals.      Treatment to include: Paraffin, Fluidotherapy, Manual therapy/joint mobilizations, Modalities for pain management, US 3 mhz, Therapeutic exercises/activities., Iontophoresis with 2.0 cc Dexamethasone, Strengthening, Orthotic Fabrication/Fit/Training, Edema Control, Scar Management, Wound Care, Electrical Modalities, Joint Protection, and Energy Conservation, as well as any other treatments deemed  necessary based on the patient's needs or progress.      Updates/Grading for next session: Continue with current plan of care       Moira Garcia OT

## 2024-09-04 ENCOUNTER — TELEPHONE (OUTPATIENT)
Dept: ORTHOPEDICS | Facility: CLINIC | Age: 49
End: 2024-09-04
Payer: MEDICAID

## 2024-09-04 NOTE — TELEPHONE ENCOUNTER
Spoke with patient. Patient stated that she had to do a drug test recently that it came out positive for cocaine. She is wanting to know if you any mediations she took after her procedure would contain that drug. I informed patient that the medications do not contain that drug. I advised patient to have them contact us and we would be happy to speak with them in regards to the mediation that Dr Esparza prescribed.

## 2024-09-04 NOTE — TELEPHONE ENCOUNTER
----- Message from Heather Cheung sent at 9/4/2024 11:08 AM CDT -----  Type:  Needs Medical Advice    Who Called: pt     Would the patient rather a call back or a response via [facility]? Call     Best Call Back Number: [phone]    Additional Information: pt requesting a call back to discuss what she was given because her drug test came back that she had cocaine in her system.

## 2024-10-09 ENCOUNTER — OFFICE VISIT (OUTPATIENT)
Dept: OBSTETRICS AND GYNECOLOGY | Facility: CLINIC | Age: 49
End: 2024-10-09
Payer: MEDICAID

## 2024-10-09 ENCOUNTER — LAB VISIT (OUTPATIENT)
Dept: LAB | Facility: HOSPITAL | Age: 49
End: 2024-10-09
Attending: OBSTETRICS & GYNECOLOGY
Payer: MEDICAID

## 2024-10-09 VITALS
SYSTOLIC BLOOD PRESSURE: 140 MMHG | DIASTOLIC BLOOD PRESSURE: 90 MMHG | BODY MASS INDEX: 41.75 KG/M2 | WEIGHT: 235.69 LBS

## 2024-10-09 DIAGNOSIS — B37.31 YEAST VAGINITIS: ICD-10-CM

## 2024-10-09 DIAGNOSIS — Z11.3 SCREENING FOR STD (SEXUALLY TRANSMITTED DISEASE): ICD-10-CM

## 2024-10-09 DIAGNOSIS — F32.A ANXIETY AND DEPRESSION: ICD-10-CM

## 2024-10-09 DIAGNOSIS — Z01.419 WELL WOMAN EXAM WITH ROUTINE GYNECOLOGICAL EXAM: Primary | ICD-10-CM

## 2024-10-09 DIAGNOSIS — F41.9 ANXIETY AND DEPRESSION: ICD-10-CM

## 2024-10-09 LAB
HIV 1+2 AB+HIV1 P24 AG SERPL QL IA: NORMAL
TREPONEMA PALLIDUM IGG+IGM AB [PRESENCE] IN SERUM OR PLASMA BY IMMUNOASSAY: NONREACTIVE

## 2024-10-09 PROCEDURE — 3080F DIAST BP >= 90 MM HG: CPT | Mod: CPTII,,, | Performed by: OBSTETRICS & GYNECOLOGY

## 2024-10-09 PROCEDURE — 3008F BODY MASS INDEX DOCD: CPT | Mod: CPTII,,, | Performed by: OBSTETRICS & GYNECOLOGY

## 2024-10-09 PROCEDURE — 99213 OFFICE O/P EST LOW 20 MIN: CPT | Mod: PBBFAC,PN | Performed by: OBSTETRICS & GYNECOLOGY

## 2024-10-09 PROCEDURE — 87389 HIV-1 AG W/HIV-1&-2 AB AG IA: CPT | Mod: PN | Performed by: OBSTETRICS & GYNECOLOGY

## 2024-10-09 PROCEDURE — 87491 CHLMYD TRACH DNA AMP PROBE: CPT | Performed by: OBSTETRICS & GYNECOLOGY

## 2024-10-09 PROCEDURE — 1159F MED LIST DOCD IN RCRD: CPT | Mod: CPTII,,, | Performed by: OBSTETRICS & GYNECOLOGY

## 2024-10-09 PROCEDURE — 3077F SYST BP >= 140 MM HG: CPT | Mod: CPTII,,, | Performed by: OBSTETRICS & GYNECOLOGY

## 2024-10-09 PROCEDURE — 87591 N.GONORRHOEAE DNA AMP PROB: CPT | Performed by: OBSTETRICS & GYNECOLOGY

## 2024-10-09 PROCEDURE — 99396 PREV VISIT EST AGE 40-64: CPT | Mod: S$PBB,,, | Performed by: OBSTETRICS & GYNECOLOGY

## 2024-10-09 PROCEDURE — 99999 PR PBB SHADOW E&M-EST. PATIENT-LVL III: CPT | Mod: PBBFAC,,, | Performed by: OBSTETRICS & GYNECOLOGY

## 2024-10-09 PROCEDURE — 86593 SYPHILIS TEST NON-TREP QUANT: CPT | Mod: PN | Performed by: OBSTETRICS & GYNECOLOGY

## 2024-10-09 PROCEDURE — 4010F ACE/ARB THERAPY RXD/TAKEN: CPT | Mod: CPTII,,, | Performed by: OBSTETRICS & GYNECOLOGY

## 2024-10-09 PROCEDURE — 36415 COLL VENOUS BLD VENIPUNCTURE: CPT | Mod: PN | Performed by: OBSTETRICS & GYNECOLOGY

## 2024-10-09 PROCEDURE — 1160F RVW MEDS BY RX/DR IN RCRD: CPT | Mod: CPTII,,, | Performed by: OBSTETRICS & GYNECOLOGY

## 2024-10-09 PROCEDURE — 0352U VAGINOSIS SCREEN BY DNA PROBE: CPT | Performed by: OBSTETRICS & GYNECOLOGY

## 2024-10-09 RX ORDER — TERCONAZOLE 8 MG/G
1 CREAM VAGINAL NIGHTLY
Qty: 20 G | Refills: 1 | Status: SHIPPED | OUTPATIENT
Start: 2024-10-09

## 2024-10-09 RX ORDER — CITALOPRAM 20 MG/1
20 TABLET, FILM COATED ORAL DAILY
Qty: 90 TABLET | Refills: 3 | Status: SHIPPED | OUTPATIENT
Start: 2024-10-09 | End: 2025-10-09

## 2024-10-09 NOTE — PROGRESS NOTES
CC: Annual check-up    SUBJECTIVE:   48 y.o. female   for annual routine Pap and checkup. Patient's last menstrual period was 07/10/2023 (exact date)..  She complains of lots of stress at home with kids, grandkids and . Requestuing something to help with mood, No SI or HI.    Has appt with pcp to discuss BOP mgmt    Past Medical History:   Diagnosis Date    Hypertension     Seizures      Past Surgical History:   Procedure Laterality Date    ADENOIDECTOMY      DILATION AND CURETTAGE OF UTERUS      EXCISION OF GANGLION OF WRIST Left 2024    Procedure: EXCISION, GANGLION CYST, WRIST;  Surgeon: Connor Esparza Jr., MD;  Location: [facility];  Service: Orthopedics;  Laterality: Left;    ROBOT-ASSISTED LAPAROSCOPIC HYSTERECTOMY N/A 2023    Procedure: ROBOTIC HYSTERECTOMY;  Surgeon: Griffin Dailey MD;  Location: [facility];  Service: OB/GYN;  Laterality: N/A;    ROBOT-ASSISTED LAPAROSCOPIC OOPHORECTOMY Right 2023    Procedure: ROBOTIC OOPHORECTOMY;  Surgeon: Griffin Dailey MD;  Location: [facility] OR;  Service: OB/GYN;  Laterality: Right;    ROBOT-ASSISTED SALPINGECTOMY Bilateral 2023    Procedure: ROBOTIC SALPINGECTOMY;  Surgeon: Griffin Dailey MD;  Location: [facility];  Service: OB/GYN;  Laterality: Bilateral;  Right Oophorectomy     Social History     Socioeconomic History    Marital status: Single   Tobacco Use    Smoking status: Never    Smokeless tobacco: Never   Substance and Sexual Activity    Alcohol use: No    Drug use: Never    Sexual activity: Yes     Partners: Male     Birth control/protection: None     Social Drivers of Health     Financial Resource Strain: High Risk (2024)    Overall Financial Resource Strain (CARDIA)     Difficulty of Paying Living Expenses: Very hard   Food Insecurity: Food Insecurity Present (2024)    Hunger Vital Sign     Worried About Running Out of Food in the Last Year: Sometimes true     Ran Out of Food in the Last Year: Sometimes true    Physical Activity: Unknown (2024)    Exercise Vital Sign     Days of Exercise per Week: 1 day   Stress: Stress Concern Present (2024)    [employer] - Occupational Stress Questionnaire     Feeling of Stress : Rather much   Housing Stability: High Risk (2024)    Housing Stability Vital Sign     Unable to Pay for Housing in the Last Year: Yes     No family history on file.  OB History    Para Term  AB Living   4 2 1 1 2 2   SAB IAB Ectopic Multiple Live Births   1   1   2      # Outcome Date GA Lbr Chris/2nd Weight Sex Type Anes PTL Lv   4  00 35w0d   F Vag-Spont   KIERA   3 Ectopic 1998           2 Term     F Vag-Spont   KIERA   1 SAB                  Current Outpatient Medications   Medication Sig Dispense Refill    amLODIPine (NORVASC) 10 MG tablet Take 10 mg by mouth once daily.      citalopram (CELEXA) 20 MG tablet Take 1 tablet (20 mg total) by mouth once daily. 90 tablet 3    HYDROcodone-acetaminophen (NORCO) 5-325 mg per tablet Take 1 tablet by mouth every 4 (four) hours as needed for Pain. 12 tablet 0    losartan (COZAAR) 100 MG tablet Take 100 mg by mouth.      metroNIDAZOLE (FLAGYL) 500 MG tablet Take 1 tablet (500 mg total) by mouth every 12 (twelve) hours. (Patient not taking: Reported on 2024) 14 tablet 0    oxyCODONE-acetaminophen (PERCOCET) 5-325 mg per tablet Take 1 tablet by mouth every 4 (four) hours as needed for Pain. 30 tablet 0    terconazole (TERAZOL 3) 0.8 % vaginal cream Place 1 applicator vaginally every evening. 20 g 1     No current facility-administered medications for this visit.     Facility-Administered Medications Ordered in Other Visits   Medication Dose Route Frequency Provider Last Rate Last Admin    lactated ringers infusion   Intravenous Continuous Vilma Briggs DNP   Stopped at 23 1058    LIDOcaine (PF) 10 mg/ml (1%) injection 10 mg  1 mL Intradermal Once Vilma Briggs DNP          Allergies: Shellfish containing products     ROS:  Constitutional: no weight loss, weight gain, fever, fatigue  Eyes:  No vision changes, glasses/contacts  ENT/Mouth: No ulcers, sinus problems, ears ringing, headache  Cardiovascular: No inability to lie flat, chest pain, exercise intolerance, swelling, heart palpitations  Respiratory: No wheezing, coughing blood, shortness of breath, or cough  Gastrointestinal: No diarrhea, bloody stool, nausea/vomiting, constipation, gas, hemorrhoids  Genitourinary: No blood in urine, painful urination, urgency of urination, frequency of urination, incomplete emptying, incontinence, abnormal bleeding, painful periods, heavy periods, vaginal discharge, vaginal odor, painful intercourse, sexual problems, bleeding after intercourse.  Musculoskeletal: No muscle weakness  Skin/Breast: No painful breasts, nipple discharge, masses, rash, ulcers  Neurological: No passing out, seizures, numbness, headache  Endocrine: No diabetes, hypothyroid, hyperthyroid, hot flashes, hair loss, abnormal hair growth, ance  Psychiatric: No depression, crying  Hematologic: No bruises, bleeding, swollen lymph nodes, anemia.      OBJECTIVE:   The patient appears well, alert, oriented x 3, in no distress.  BP (!) 140/90   Wt 106.9 kg (235 lb 10.8 oz)   LMP 07/10/2023 (Exact Date)   BMI 41.75 kg/m²   NECK: no thyromegaly, trachea midline  SKIN: no acne, striae, hirsutism  BREAST EXAM: not examined  ABDOMEN:  obese and no hernias, masses, or hepatosplenomegaly  GENITALIA: normal external genitalia, no erythema, no discharge  URETHRA: normal urethra, normal urethral meatus  VAGINA: vaginal discharge copious, white, and thick  CERVIX: absent  UTERUS: uterus absent  ADNEXA: no mass, fullness, tenderness     DATE OF PROCEDURE:  9/13/23     PREOPERATIVE DIAGNOSIS:    Menorrhagia, rt ovarian cyst and hydrosalpinx     POSTOPERATIVE DIAGNOSIS:    Same as preop     SURGERY:  Robotic TLH/BS at rt oopherectomy      SURGEON:  Griffin Dailey M.D.     ASSISTANT:  Nidhi Chacon     ANESTHESIA:  General endotracheal tube anesthesia.     ESTIMATED BLOOD LOSS:  20 mL     FINDINGS:  dilated bilateral tubes and nml left ovaries and normal uterus.extensive adhesion of colon to post uterus     SPECIMENS:  Uterus and cervix. Bilateral tubes , rt ovary     Final Pathologic Diagnosis Uterus, cervix, bilateral fallopian tubes and right ovary weighing 147 g showing:   Secretory endometrium with adenomyosis   The cervix has nabothian cysts   The right ovary has a hemorrhagic corpus luteum, a hemorrhagic simple cyst and multiple small benign follicle cysts.     The right fallopian tube is dilated and has small paratubal cysts   Unremarkable left fallopian tube       ASSESSMENT:   well woman  1. Well woman exam with routine gynecological exam    2. Screening for STD (sexually transmitted disease)    3. Yeast vaginitis    4. Anxiety and depression        PLAN:   mammogram  pap smear  return annually or prn  Orders Placed This Encounter    Mammo Digital Screening Bilat w/ William    C. trachomatis/N. gonorrhoeae by AMP DNA    Vaginosis Screen by DNA Probe    Treponema Pallidium Antibodies IgG, IgM    HIV 1/2 Ag/Ab (4th Gen)    Ambulatory referral/consult to Psychiatry    citalopram (CELEXA) 20 MG tablet    terconazole (TERAZOL 3) 0.8 % vaginal cream

## 2024-10-10 LAB
BACTERIAL VAGINOSIS DNA: DETECTED
C TRACH DNA SPEC QL NAA+PROBE: NOT DETECTED
CANDIDA GLABRATA/KRUSEI: NOT DETECTED
CANDIDA RRNA VAG QL PROBE: DETECTED
N GONORRHOEA DNA SPEC QL NAA+PROBE: NOT DETECTED
TRICHOMONAS VAGINALIS: NOT DETECTED

## 2024-10-11 ENCOUNTER — TELEPHONE (OUTPATIENT)
Dept: OBSTETRICS AND GYNECOLOGY | Facility: HOSPITAL | Age: 49
End: 2024-10-11
Payer: MEDICAID

## 2024-10-11 RX ORDER — METRONIDAZOLE 500 MG/1
500 TABLET ORAL EVERY 12 HOURS
Qty: 14 TABLET | Refills: 0 | Status: SHIPPED | OUTPATIENT
Start: 2024-10-11

## 2024-11-18 ENCOUNTER — HOSPITAL ENCOUNTER (OUTPATIENT)
Dept: RADIOLOGY | Facility: HOSPITAL | Age: 49
Discharge: HOME | End: 2024-11-18
Attending: OBSTETRICS & GYNECOLOGY
Payer: MEDICAID

## 2024-11-18 DIAGNOSIS — Z01.419 WELL WOMAN EXAM WITH ROUTINE GYNECOLOGICAL EXAM: ICD-10-CM

## 2024-11-18 PROCEDURE — 77067 SCR MAMMO BI INCL CAD: CPT | Mod: 26,,, | Performed by: RADIOLOGY

## 2024-11-18 PROCEDURE — 77063 BREAST TOMOSYNTHESIS BI: CPT | Mod: TC,PN

## 2024-11-18 PROCEDURE — 77063 BREAST TOMOSYNTHESIS BI: CPT | Mod: 26,,, | Performed by: RADIOLOGY

## 2024-11-27 ENCOUNTER — TELEPHONE (OUTPATIENT)
Dept: ORTHOPEDICS | Facility: CLINIC | Age: 49
End: 2024-11-27
Payer: MEDICAID

## 2024-11-27 ENCOUNTER — PATIENT MESSAGE (OUTPATIENT)
Dept: ORTHOPEDICS | Facility: CLINIC | Age: 49
End: 2024-11-27
Payer: MEDICAID

## 2024-11-27 NOTE — TELEPHONE ENCOUNTER
Spoke with daughter, who states that she will inform patient to complete Patient IQ. All questions answered and verbalization expressed.

## 2025-01-20 ENCOUNTER — PATIENT MESSAGE (OUTPATIENT)
Dept: OBSTETRICS AND GYNECOLOGY | Facility: CLINIC | Age: 50
End: 2025-01-20
Payer: MEDICAID

## 2025-04-26 ENCOUNTER — HOSPITAL ENCOUNTER (EMERGENCY)
Facility: HOSPITAL | Age: 50
Discharge: HOME | End: 2025-04-26
Attending: FAMILY MEDICINE
Payer: MEDICAID

## 2025-04-26 VITALS
SYSTOLIC BLOOD PRESSURE: 168 MMHG | BODY MASS INDEX: 41.29 KG/M2 | OXYGEN SATURATION: 99 % | HEIGHT: 63 IN | RESPIRATION RATE: 20 BRPM | DIASTOLIC BLOOD PRESSURE: 77 MMHG | TEMPERATURE: 98 F | HEART RATE: 93 BPM | WEIGHT: 233 LBS

## 2025-04-26 DIAGNOSIS — R51.9 ACUTE NONINTRACTABLE HEADACHE, UNSPECIFIED HEADACHE TYPE: ICD-10-CM

## 2025-04-26 DIAGNOSIS — I10 ESSENTIAL HYPERTENSION: Primary | Chronic | ICD-10-CM

## 2025-04-26 DIAGNOSIS — R29.818 ACUTE FOCAL NEUROLOGICAL DEFICIT: ICD-10-CM

## 2025-04-26 LAB
ABSOLUTE EOSINOPHIL (OHS): 0.08 K/UL
ABSOLUTE MONOCYTE (OHS): 0.22 K/UL (ref 0.3–1)
ABSOLUTE NEUTROPHIL COUNT (OHS): 4.1 K/UL (ref 1.8–7.7)
ALBUMIN SERPL BCP-MCNC: 4.6 G/DL (ref 3.5–5.2)
ALP SERPL-CCNC: 98 UNIT/L (ref 38–126)
ALT SERPL W/O P-5'-P-CCNC: 18 UNIT/L (ref 10–44)
ANION GAP (OHS): 14 MMOL/L (ref 8–16)
AST SERPL-CCNC: 22 UNIT/L (ref 15–46)
BASOPHILS # BLD AUTO: 0.04 K/UL
BASOPHILS NFR BLD AUTO: 0.7 %
BILIRUB SERPL-MCNC: 0.6 MG/DL (ref 0.1–1)
BUN SERPL-MCNC: 11 MG/DL (ref 7–17)
CALCIUM SERPL-MCNC: 9.6 MG/DL (ref 8.7–10.5)
CHLORIDE SERPL-SCNC: 105 MMOL/L (ref 95–110)
CO2 SERPL-SCNC: 23 MMOL/L (ref 23–29)
CREAT SERPL-MCNC: 0.7 MG/DL (ref 0.5–1.4)
ERYTHROCYTE [DISTWIDTH] IN BLOOD BY AUTOMATED COUNT: 14.7 % (ref 11.5–14.5)
GFR SERPLBLD CREATININE-BSD FMLA CKD-EPI: >60 ML/MIN/1.73/M2
GLUCOSE SERPL-MCNC: 131 MG/DL (ref 70–110)
HCT VFR BLD AUTO: 46 % (ref 37–48.5)
HGB BLD-MCNC: 14.5 GM/DL (ref 12–16)
IMM GRANULOCYTES # BLD AUTO: 0.03 K/UL (ref 0–0.04)
IMM GRANULOCYTES NFR BLD AUTO: 0.5 % (ref 0–0.5)
LYMPHOCYTES # BLD AUTO: 1.57 K/UL (ref 1–4.8)
MCH RBC QN AUTO: 27.3 PG (ref 27–31)
MCHC RBC AUTO-ENTMCNC: 31.5 G/DL (ref 32–36)
MCV RBC AUTO: 87 FL (ref 82–98)
NUCLEATED RBC (/100WBC) (OHS): 0 /100 WBC
PLATELET # BLD AUTO: 209 K/UL (ref 150–450)
PMV BLD AUTO: 12.1 FL (ref 9.2–12.9)
POCT GLUCOSE: 137 MG/DL (ref 70–110)
POTASSIUM SERPL-SCNC: 3.7 MMOL/L (ref 3.5–5.1)
PROT SERPL-MCNC: 8.3 GM/DL (ref 6–8.4)
RBC # BLD AUTO: 5.31 M/UL (ref 4–5.4)
RELATIVE EOSINOPHIL (OHS): 1.3 %
RELATIVE LYMPHOCYTE (OHS): 26 % (ref 18–48)
RELATIVE MONOCYTE (OHS): 3.6 % (ref 4–15)
RELATIVE NEUTROPHIL (OHS): 67.9 % (ref 38–73)
SODIUM SERPL-SCNC: 142 MMOL/L (ref 136–145)
TROPONIN I SERPL DL<=0.01 NG/ML-MCNC: <0.012 NG/ML (ref 0–0.03)
TSH SERPL-ACNC: 1.25 UIU/ML (ref 0.4–4)
WBC # BLD AUTO: 6.04 K/UL (ref 3.9–12.7)

## 2025-04-26 PROCEDURE — 96376 TX/PRO/DX INJ SAME DRUG ADON: CPT | Mod: ER

## 2025-04-26 PROCEDURE — 84484 ASSAY OF TROPONIN QUANT: CPT | Mod: ER

## 2025-04-26 PROCEDURE — 85025 COMPLETE CBC W/AUTO DIFF WBC: CPT | Mod: ER

## 2025-04-26 PROCEDURE — 93005 ELECTROCARDIOGRAM TRACING: CPT | Mod: ER

## 2025-04-26 PROCEDURE — 96374 THER/PROPH/DIAG INJ IV PUSH: CPT | Mod: ER

## 2025-04-26 PROCEDURE — 82962 GLUCOSE BLOOD TEST: CPT | Mod: ER

## 2025-04-26 PROCEDURE — 99285 EMERGENCY DEPT VISIT HI MDM: CPT | Mod: 25,ER

## 2025-04-26 PROCEDURE — 84443 ASSAY THYROID STIM HORMONE: CPT | Mod: ER

## 2025-04-26 PROCEDURE — 96375 TX/PRO/DX INJ NEW DRUG ADDON: CPT | Mod: ER

## 2025-04-26 PROCEDURE — 93010 ELECTROCARDIOGRAM REPORT: CPT | Mod: ,,, | Performed by: STUDENT IN AN ORGANIZED HEALTH CARE EDUCATION/TRAINING PROGRAM

## 2025-04-26 PROCEDURE — 80053 COMPREHEN METABOLIC PANEL: CPT | Mod: ER

## 2025-04-26 PROCEDURE — 63600175 PHARM REV CODE 636 W HCPCS: Mod: ER

## 2025-04-26 RX ORDER — KETOROLAC TROMETHAMINE 10 MG/1
10 TABLET, FILM COATED ORAL EVERY 6 HOURS
Qty: 20 TABLET | Refills: 0 | Status: SHIPPED | OUTPATIENT
Start: 2025-04-26 | End: 2025-05-01

## 2025-04-26 RX ORDER — KETOROLAC TROMETHAMINE 30 MG/ML
15 INJECTION, SOLUTION INTRAMUSCULAR; INTRAVENOUS
Status: COMPLETED | OUTPATIENT
Start: 2025-04-26 | End: 2025-04-26

## 2025-04-26 RX ORDER — HYDRALAZINE HYDROCHLORIDE 20 MG/ML
10 INJECTION INTRAMUSCULAR; INTRAVENOUS
Status: COMPLETED | OUTPATIENT
Start: 2025-04-26 | End: 2025-04-26

## 2025-04-26 RX ORDER — PROCHLORPERAZINE EDISYLATE 5 MG/ML
10 INJECTION INTRAMUSCULAR; INTRAVENOUS ONCE
Status: COMPLETED | OUTPATIENT
Start: 2025-04-26 | End: 2025-04-26

## 2025-04-26 RX ORDER — HYDRALAZINE HYDROCHLORIDE 20 MG/ML
20 INJECTION INTRAMUSCULAR; INTRAVENOUS
Status: COMPLETED | OUTPATIENT
Start: 2025-04-26 | End: 2025-04-26

## 2025-04-26 RX ORDER — DIPHENHYDRAMINE HYDROCHLORIDE 50 MG/ML
25 INJECTION, SOLUTION INTRAMUSCULAR; INTRAVENOUS
Status: COMPLETED | OUTPATIENT
Start: 2025-04-26 | End: 2025-04-26

## 2025-04-26 RX ADMIN — DIPHENHYDRAMINE HYDROCHLORIDE 25 MG: 50 INJECTION, SOLUTION INTRAMUSCULAR; INTRAVENOUS at 10:04

## 2025-04-26 RX ADMIN — HYDRALAZINE HYDROCHLORIDE 10 MG: 20 INJECTION INTRAMUSCULAR; INTRAVENOUS at 11:04

## 2025-04-26 RX ADMIN — KETOROLAC TROMETHAMINE 15 MG: 30 INJECTION, SOLUTION INTRAMUSCULAR; INTRAVENOUS at 11:04

## 2025-04-26 RX ADMIN — HYDRALAZINE HYDROCHLORIDE 20 MG: 20 INJECTION INTRAMUSCULAR; INTRAVENOUS at 12:04

## 2025-04-26 RX ADMIN — PROCHLORPERAZINE EDISYLATE 10 MG: 5 INJECTION INTRAMUSCULAR; INTRAVENOUS at 10:04

## 2025-04-26 NOTE — DISCHARGE INSTRUCTIONS
After evaluating your headache today, it was determined not likely to be due to an emergency, but is likely due to an unpleasant cause like a migraine or tension headache.    After your visit, your pain should be improving.  Return to the Emergency Department immediately for pain that is getting worse.    You should be on the lookout for new symptoms that can happen if your headache is from something dangerous, like vision problems, trouble with coordination, trouble with speech or swallowing, or weakness or numbness on one side of the body.  Return to the Emergency Room for any new or changing symptoms.    If you are improving in 1-2 days but not completely well yet, then please follow up with your primary care doctor, [place] for a recheck.  For most people, following up with your normal doctor is all that is needed, but they may determine that you need additional neurology specialist follow up.      To control your symptoms at home take medications as prescribed, or Ibuprofen and/or Tylenol as directed.  Strong narcotic pain medications are not recommended for the treatment of headaches and we do not use them for headaches in the Emergency Department.    Thank you for allowing me and my emergency team to take care of you here today! I hope you feel better soon. Please do not hesitate to return with any additional concerns that may arise from this or any new problem you encounter.    Our goal in the emergency department is to always give you outstanding care and exceptional service. If you receive a survey by mail or e-mail in the next week regarding your experience in our ED, we would greatly appreciate you completing it. Your feedback provides us with a way to recognize our staff who give very good care and it helps us learn how to improve when your experience was below the excellence we aspire to be!    Brook Juneau, PA-C [facility] [place] [place]   Emergency Room  Physician Assistant

## 2025-04-26 NOTE — ED PROVIDER NOTES
"Encounter Date: 4/26/2025       History     Chief Complaint   Patient presents with    Hypertension     Patient presents to ED with HTN, nausea, and vomiting that started today.      Patient is a 49-year-old female with a past medical history of hypertension and seizures who presents to emergency room for sudden onset of headache with nausea and vomiting that started today upon waking up.  Patient states that headache started suddenly when she woke up.  Located behind right eye.  She also has a associated photophobia with nausea and vomiting.  Endorses numbness specifically to dorsal aspect of left hand.  Numbness does not radiate throat entirety of left upper extremity.  No weakness.  No head injury.  No syncope or seizures prior to arrival.  Patient states that she has a history of headaches that have presented similarly in the past.  She states that "it is usually when my pressure is high." She took her medications this morning.  No other medications taken for headache prior to arrival.    The history is provided by the patient. No  was used.     Review of patient's allergies indicates:   Allergen Reactions    Shellfish containing products Hives     Past Medical History:   Diagnosis Date    Hypertension     Seizures      Past Surgical History:   Procedure Laterality Date    ADENOIDECTOMY      DILATION AND CURETTAGE OF UTERUS      EXCISION OF GANGLION OF WRIST Left 7/2/2024    Procedure: EXCISION, GANGLION CYST, WRIST;  Surgeon: Connor Esparza Jr., MD;  Location: [facility] OR;  Service: Orthopedics;  Laterality: Left;    ROBOT-ASSISTED LAPAROSCOPIC HYSTERECTOMY N/A 9/13/2023    Procedure: ROBOTIC HYSTERECTOMY;  Surgeon: Griffin Dailey MD;  Location: [facility] OR;  Service: OB/GYN;  Laterality: N/A;    ROBOT-ASSISTED LAPAROSCOPIC OOPHORECTOMY Right 9/13/2023    Procedure: ROBOTIC OOPHORECTOMY;  Surgeon: Griffin Dailey MD;  Location: [facility] OR;  Service: OB/GYN;  Laterality: Right;    " ROBOT-ASSISTED SALPINGECTOMY Bilateral 9/13/2023    Procedure: ROBOTIC SALPINGECTOMY;  Surgeon: Griffin Dailey MD;  Location: [facility] OR;  Service: OB/GYN;  Laterality: Bilateral;  Right Oophorectomy     No family history on file.  Social History[1]  Review of Systems   Constitutional:  Negative for chills, diaphoresis, fatigue and fever.   HENT:  Negative for congestion, sore throat and trouble swallowing.    Eyes:  Positive for photophobia. Negative for discharge, itching and visual disturbance.   Respiratory:  Negative for cough and shortness of breath.    Cardiovascular:  Negative for chest pain and palpitations.   Gastrointestinal:  Positive for nausea and vomiting. Negative for abdominal pain, blood in stool, constipation and diarrhea.   Genitourinary:  Negative for difficulty urinating, dysuria, frequency and urgency.   Musculoskeletal:  Negative for back pain and myalgias.   Skin:  Negative for rash and wound.   Neurological:  Positive for numbness (left dorsal aspect of hand) and headaches (right-sided). Negative for weakness and light-headedness.       Physical Exam     Initial Vitals [04/26/25 1018]   BP Pulse Resp Temp SpO2   (!) 182/125 76 20 97.6 °F (36.4 °C) 98 %      MAP       --         Physical Exam    Nursing note and vitals reviewed.  Constitutional: She appears well-developed and well-nourished. She is not diaphoretic. No distress.   Patient sitting in wheelchair.  Appears distress due to pain.  Awake and alert.  Maintaining airway appropriately.    HENT:   Head: Normocephalic and atraumatic.   Right Ear: External ear normal.   Left Ear: External ear normal.   Eyes: Conjunctivae and EOM are normal.   Evidence of photophobia.  No nystagmus.   Neck: Neck supple.   Normal range of motion.  Pulmonary/Chest: No respiratory distress.   Musculoskeletal:         General: No tenderness or edema. Normal range of motion.      Cervical back: Normal range of motion and neck supple.     Neurological: She is  alert and oriented to person, place, and time. She has normal strength. A sensory deficit is present. No cranial nerve deficit. GCS eye subscore is 4. GCS verbal subscore is 5. GCS motor subscore is 6.   Sensory deficit noted to dorsal aspect of left hand.  Does not radiate to left forearm or upper arm.  Strength 5/5 in bilateral upper extremities.   strength 5/5 and equal in both sides.  No cranial nerve deficits.  No tremor.  No slurred speech.   Skin: Skin is warm.         ED Course   Procedures  Labs Reviewed   COMPREHENSIVE METABOLIC PANEL - Abnormal       Result Value    Sodium 142      Potassium 3.7      Chloride 105      CO2 23      Glucose 131 (*)     BUN 11      Creatinine 0.7      Calcium 9.6      Protein Total 8.3      Albumin 4.6      Bilirubin Total 0.6      ALP 98      AST 22      ALT 18      Anion Gap 14      eGFR >60     CBC WITH DIFFERENTIAL - Abnormal    WBC 6.04      RBC 5.31      HGB 14.5      HCT 46.0      MCV 87      MCH 27.3      MCHC 31.5 (*)     RDW 14.7 (*)     Platelet Count 209      MPV 12.1      Nucleated RBC 0      Neut % 67.9      Lymph % 26.0      Mono % 3.6 (*)     Eos % 1.3      Basophil % 0.7      Imm Grans % 0.5      Neut # 4.10      Lymph # 1.57      Mono # 0.22 (*)     Eos # 0.08      Baso # 0.04      Imm Grans # 0.03     POCT GLUCOSE - Abnormal    POCT Glucose 137 (*)    TSH - Normal    TSH 1.250     TROPONIN I - Normal    Troponin-I <0.012     CBC W/ AUTO DIFFERENTIAL    Narrative:     The following orders were created for panel order CBC W/ AUTO DIFFERENTIAL.  Procedure                               Abnormality         Status                     ---------                               -----------         ------                     CBC with Differential[[id number]]       Abnormal            Final result                 Please view results for these tests on the individual orders.          Imaging Results              CT Head Without Contrast (Final result)  Result time  04/26/25 11:21:27      Final result by Christina David MD (04/26/25 11:21:27)                   Impression:      No evidence of an acute intracranial abnormality.        Finalized on: 4/26/2025 11:21 AM By:  Christina David MD  [facility]# [id number]      2025-04-26 11:23:30.912     [facility]               Narrative:    PROCEDURE:  CT HEAD WITHOUT CONTRAST    INDICATION:  Severe headache    TECHNIQUE:  Multidetector-row CT examination of the brain was performed from base to vertex [without] intravenous contrast material.This CT utilized automated exposure control and/or adjustment of the mA according to patient size and/or iterative reconstruction technique(s).    COMPARISON:Head CT 07/06/2019    FINDINGS:  The ventricles and sulci are normal in size and contour for age.  There is no evidence of intracranial hemorrhage or extra-axial collection.  No shift of the midline structures or mass effect is seen.  Gray-white matter differentiation is maintained.    No acute fracture of the skull base or calvarium is identified.  The visualized paranasal sinuses are clear.  The mastoid air cells are clear.  The visualized orbits are unremarkable.                                         Medications   prochlorperazine injection Soln 10 mg (10 mg Intravenous Given 4/26/25 1039)   diphenhydrAMINE injection 25 mg (25 mg Intravenous Given 4/26/25 1036)   ketorolac injection 15 mg (15 mg Intravenous Given 4/26/25 1130)   hydrALAZINE injection 10 mg (10 mg Intravenous Given 4/26/25 1130)   hydrALAZINE injection 20 mg (20 mg Intravenous Given 4/26/25 1225)     Medical Decision Making  Patient presents to emergency room for headache with nausea and vomiting.  Blood pressure 182/125.  Vital signs otherwise stable.  Physical exam as stated above.    Differential Diagnosis includes, but is not limited to ischemic stroke, hemorrhagic stroke, subarachnoid hemorrhage/ruptured aneurysm, intracranial lesion/mass, meningitis/encephalitis, epidural hematoma, subdural  hematoma, pseudotumor cerebri, hypertensive encephalopathy, MI/ACS, head trauma/contusion, concussion, sinus headache, dehydration, anxiety, medication non-compliance, or primary headache (tension/cluster/migraine).  Patient neurologically intact.  I do not suspect CVA at this time. Patient afebrile.  No neck pain or stiffness. I do not suspect meningitis/encephalitis.  No trauma that would indicate epidural or subdural hematoma. No chest pain or other clinical factors that would suggest MI/ACS.  Lab work grossly unremarkable.  No evidence of end-stage organ damage due to hypertension.  CT head without evidence of subarachnoid hemorrhage.  Clinical presentation and physical exam most suggestive of migraine headache with a essential hypertension.  Patient given Compazine, Benadryl, and Toradol with improvement in headache.  She was also given several doses of hydralazine with improvement in her blood pressure. I do not think the patient's presentation necessitates further intervention from what has been performed in the Emergency Department to acutely decrease their blood pressure. I have given the patient and/or their family specific return precautions and instructions to follow up with their regular doctor. Given information on heart healthy diet and exercise to improve blood pressure.  Will prescribe Toradol to use upon discharge for headache.  Advised to continue home medications and follow up with primary care physician.    I see no indication of an emergent process beyond that addressed during our encounter. Patient stable for discharge at this time. I have counseled the patient regarding follow up with primary care and gave strict return precautions. I have discussed the final diagnosis and gave instructions regarding home and prescribe medications. Patient verbalized understanding and is agreeable.     Problems Addressed:  Acute focal neurological deficit: acute illness or injury  Acute nonintractable  headache, unspecified headache type: acute illness or injury  Essential hypertension: acute illness or injury    Amount and/or Complexity of Data Reviewed  External Data Reviewed: notes.     Details: Patient last seen by primary care in 04/16/2025.  Seen in the emergency room for high blood pressure in 01/2025.  Patient had surgery for left ganglionic cyst in 07/2024.  Was going through physical therapy/OT in 08/2024 due to left hand weakness.  Labs: ordered. Decision-making details documented in ED Course.  Radiology: ordered. Decision-making details documented in ED Course.  ECG/medicine tests: ordered. Decision-making details documented in ED Course.    Risk  Prescription drug management.  Risk Details: Comorbidities taken into consideration during the patient's evaluation and treatment include hypertension and seizures.  Social determinants of health taken into consideration during development of our treatment plan include difficulty in obtaining follow-up, obtaining medications, health literacy, access to healthy options for preventative/conservative management, and/or support systems due to, but not limited to, transportation limitations, socioeconomic status, and environmental factors.                ED Course as of 04/26/25 1450   Sat Apr 26, 2025   1030 POCT Glucose(!): 137 [BJ]   1106 CBC W/ AUTO DIFFERENTIAL(!)  CBC unremarkable.  No leukocytosis.  H/H stable and within normal limits.  Platelet count within normal limits. [BJ]   1124 CT Head Without Contrast  Impression:     No evidence of an acute intracranial abnormality. [BJ]   1125 Comprehensive metabolic panel(!)  CMP relatively unremarkable.  Electrolytes within normal limits.  BUN and creatinine within normal limits.  LFTs within normal limits. [BJ]   1125 BP(!): 196/98  Improvement in diastolic.  However, systolic increasing.  Hydralazine ordered. [BJ]   1125 CT Head Without Contrast  Impression:   No evidence of an acute intracranial abnormality.  [BJ]   1126 CT head unremarkable.  Toradol ordered for headache. [BJ]   1132 Troponin I: <0.012  Troponin within normal limits. [BJ]   1146 TSH: 1.250  TSH within normal limits. [BJ]   1146 BP(!): 182/96  Improvement after hydralazine. [BJ]   1158 On re-evaluation, patient states that headache is much improved. [BJ]   1226 BP(!): (P) 176/96  Hydralazine 20 ordered.  Blood pressure much improved.  Will give 10 initially and re-evaluate. [BJ]   1304 BP(!): 160/81  Blood pressure much improved.  Patient ambulating to the bathroom independently.  Will plan for discharge. [BJ]      ED Course User Index  [BJ] Taryn Walker PA-C                           Clinical Impression:  Final diagnoses:  [R29.818] Acute focal neurological deficit  [R51.9] Acute nonintractable headache, unspecified headache type  [I10] Essential hypertension (Primary) (Chronic)          ED Disposition Condition    Discharge Good          ED Prescriptions       Medication Sig Dispense Start Date End Date Auth. Provider    ketorolac (TORADOL) 10 mg tablet Take 1 tablet (10 mg total) by mouth every 6 (six) hours. for 5 days 20 tablet 4/26/2025 5/1/2025 Taryn Walker PA-C          Follow-up Information       Follow up With Specialties Details Why [facility], Internal Medicine   [address] [place] [id number] [phone]      [facility] - Emergency Dept Emergency Medicine Go to  If new or worsening symptoms occur [address] [facility]  Emergency Department  [facility] [id number] [phone]            This note was partially created using [employer] Voice Recognition software. Typographical and content errors may occur with this process. While efforts are made to detect and correct such errors, in some cases errors will persist. For this reason, wording in this document should be considered in the proper context and not strictly verbatim.          [1]   Social History  Tobacco Use     Smoking status: Never    Smokeless tobacco: Never   Substance Use Topics    Alcohol use: No    Drug use: Never        Taryn Walker PA-C  04/26/25 1458

## 2025-04-26 NOTE — Clinical Note
"Maru "Belle Camacho was seen and treated in our emergency department on 4/26/2025.  She may return to work on 04/28/2025.       If you have any questions or concerns, please don't hesitate to call.      Taryn Walker PA-C"

## 2025-04-28 LAB
OHS QRS DURATION: 96 MS
OHS QTC CALCULATION: 462 MS

## 2025-05-25 ENCOUNTER — HOSPITAL ENCOUNTER (EMERGENCY)
Facility: HOSPITAL | Age: 50
Discharge: HOME | End: 2025-05-25
Attending: STUDENT IN AN ORGANIZED HEALTH CARE EDUCATION/TRAINING PROGRAM
Payer: MEDICAID

## 2025-05-25 VITALS
HEART RATE: 79 BPM | TEMPERATURE: 98 F | OXYGEN SATURATION: 97 % | DIASTOLIC BLOOD PRESSURE: 93 MMHG | SYSTOLIC BLOOD PRESSURE: 147 MMHG | RESPIRATION RATE: 18 BRPM | WEIGHT: 233 LBS | HEIGHT: 63 IN | BODY MASS INDEX: 41.29 KG/M2

## 2025-05-25 DIAGNOSIS — M54.16 LUMBAR RADICULOPATHY: ICD-10-CM

## 2025-05-25 DIAGNOSIS — S39.012A LUMBAR STRAIN, INITIAL ENCOUNTER: Primary | ICD-10-CM

## 2025-05-25 PROCEDURE — 25000003 PHARM REV CODE 250: Mod: ER | Performed by: STUDENT IN AN ORGANIZED HEALTH CARE EDUCATION/TRAINING PROGRAM

## 2025-05-25 PROCEDURE — 96372 THER/PROPH/DIAG INJ SC/IM: CPT | Performed by: STUDENT IN AN ORGANIZED HEALTH CARE EDUCATION/TRAINING PROGRAM

## 2025-05-25 PROCEDURE — 63600175 PHARM REV CODE 636 W HCPCS: Mod: JZ,TB,ER | Performed by: STUDENT IN AN ORGANIZED HEALTH CARE EDUCATION/TRAINING PROGRAM

## 2025-05-25 PROCEDURE — 99284 EMERGENCY DEPT VISIT MOD MDM: CPT | Mod: 25,ER

## 2025-05-25 RX ORDER — KETOROLAC TROMETHAMINE 10 MG/1
10 TABLET, FILM COATED ORAL EVERY 8 HOURS
Qty: 15 TABLET | Refills: 0 | Status: SHIPPED | OUTPATIENT
Start: 2025-05-25 | End: 2025-05-30

## 2025-05-25 RX ORDER — METHOCARBAMOL 500 MG/1
1000 TABLET, FILM COATED ORAL
Status: COMPLETED | OUTPATIENT
Start: 2025-05-25 | End: 2025-05-25

## 2025-05-25 RX ORDER — ACETAMINOPHEN 500 MG
1000 TABLET ORAL
Status: COMPLETED | OUTPATIENT
Start: 2025-05-25 | End: 2025-05-25

## 2025-05-25 RX ORDER — DEXAMETHASONE SODIUM PHOSPHATE 4 MG/ML
8 INJECTION, SOLUTION INTRA-ARTICULAR; INTRALESIONAL; INTRAMUSCULAR; INTRAVENOUS; SOFT TISSUE
Status: COMPLETED | OUTPATIENT
Start: 2025-05-25 | End: 2025-05-25

## 2025-05-25 RX ORDER — METHOCARBAMOL 500 MG/1
500 TABLET, FILM COATED ORAL 3 TIMES DAILY
Qty: 20 TABLET | Refills: 0 | Status: SHIPPED | OUTPATIENT
Start: 2025-05-25 | End: 2025-06-01

## 2025-05-25 RX ORDER — METHYLPREDNISOLONE 4 MG/1
TABLET ORAL
Qty: 21 EACH | Refills: 0 | Status: SHIPPED | OUTPATIENT
Start: 2025-05-25

## 2025-05-25 RX ORDER — KETOROLAC TROMETHAMINE 30 MG/ML
30 INJECTION, SOLUTION INTRAMUSCULAR; INTRAVENOUS
Status: COMPLETED | OUTPATIENT
Start: 2025-05-25 | End: 2025-05-25

## 2025-05-25 RX ADMIN — METHOCARBAMOL 1000 MG: 500 TABLET ORAL at 06:05

## 2025-05-25 RX ADMIN — ACETAMINOPHEN 1000 MG: 500 TABLET ORAL at 06:05

## 2025-05-25 RX ADMIN — KETOROLAC TROMETHAMINE 30 MG: 30 INJECTION, SOLUTION INTRAMUSCULAR; INTRAVENOUS at 06:05

## 2025-05-25 RX ADMIN — DEXAMETHASONE SODIUM PHOSPHATE 8 MG: 4 INJECTION, SOLUTION INTRA-ARTICULAR; INTRALESIONAL; INTRAMUSCULAR; INTRAVENOUS; SOFT TISSUE at 06:05

## 2025-05-25 NOTE — ED PROVIDER NOTES
"ED Provider Note - 5/25/2025    History     Chief Complaint   Patient presents with    Back Pain     Pt to the ER with c/o "severe lower back pain" - states she stands up everyday for 8 hours on her feet at work. Pt has not taken anything for pain. Denies injury.        LISHA Camacho is a 49 y.o. year old female with past medical and surgical history as seen below, presenting with chief complaint of lower back pain.  Ongoing for 2-3 weeks however worsening over the past day or 2.  Patient works on her feet daily.  Is experiencing left-sided back pain with radiation of pain down the right lower extremity.  Has follow up with spine specialist sometime next month.        Past Medical History:   Diagnosis Date    Hypertension     Seizures      Past Surgical History:   Procedure Laterality Date    ADENOIDECTOMY      DILATION AND CURETTAGE OF UTERUS      EXCISION OF GANGLION OF WRIST Left 7/2/2024    Procedure: EXCISION, GANGLION CYST, WRIST;  Surgeon: Connor Esparza Jr., MD;  Location: [facility] OR;  Service: Orthopedics;  Laterality: Left;    ROBOT-ASSISTED LAPAROSCOPIC HYSTERECTOMY N/A 9/13/2023    Procedure: ROBOTIC HYSTERECTOMY;  Surgeon: Griffin Dailey MD;  Location: [facility] OR;  Service: OB/GYN;  Laterality: N/A;    ROBOT-ASSISTED LAPAROSCOPIC OOPHORECTOMY Right 9/13/2023    Procedure: ROBOTIC OOPHORECTOMY;  Surgeon: Griffin Dailey MD;  Location: [facility] OR;  Service: OB/GYN;  Laterality: Right;    ROBOT-ASSISTED SALPINGECTOMY Bilateral 9/13/2023    Procedure: ROBOTIC SALPINGECTOMY;  Surgeon: Griffin Dailey MD;  Location: [facility] OR;  Service: OB/GYN;  Laterality: Bilateral;  Right Oophorectomy         No family history on file.  Social History[1]  Social Drivers of Health with Concerns     Financial Resource Strain: High Risk (6/12/2024)    Overall Financial Resource Strain (CARDIA)     Difficulty of Paying Living Expenses: Very hard   Food Insecurity: Food Insecurity Present (6/12/2024)    Hunger " "Vital Sign     Worried About Running Out of Food in the Last Year: Sometimes true     Ran Out of Food in the Last Year: Sometimes true   Transportation Needs: Not on file   Physical Activity: Unknown (6/12/2024)    Exercise Vital Sign     Days of Exercise per Week: 1 day     Minutes of Exercise per Session: Not on file   Stress: Stress Concern Present (6/12/2024)    [employer] [place] - Occupational Stress Questionnaire     Feeling of Stress : Rather much   Housing Stability: High Risk (6/12/2024)    Housing Stability Vital Sign     Unable to Pay for Housing in the Last Year: Yes     Number of Times Moved in the Last Year: Not on file     Homeless in the Last Year: Not on file   Depression: Not on file   Social Isolation: Not on file      Review of patient's allergies indicates:   Allergen Reactions    Shellfish containing products Hives       Review of Systems     A full Review of Systems (ROS) was performed and was negative unless otherwise stated in the HPI.      Physical Exam     Vitals:    05/25/25 0544 05/25/25 0633   BP: (!) 147/93    Pulse: 79    Resp: 18    Temp: 97.7 °F (36.5 °C) 97.7 °F (36.5 °C)   TempSrc: Oral    SpO2: 97%    Weight: 105.7 kg (233 lb)    Height: 5' 3" (1.6 m)         Physical Exam    Nursing note and vitals reviewed.  Constitutional: She appears well-developed and well-nourished.   HENT:   Head: Normocephalic and atraumatic.   Eyes: Conjunctivae and EOM are normal.   Cardiovascular:  Intact distal pulses.           Pulmonary/Chest: No respiratory distress.   Musculoskeletal:      Cervical back: No bony tenderness.      Thoracic back: No bony tenderness.      Lumbar back: Tenderness (left-sided) and bony tenderness present.     Neurological: She is alert and oriented to person, place, and time. No cranial nerve deficit. Gait normal.   Skin: Skin is warm and dry.   Psychiatric: She has a normal mood and affect. Her behavior is normal. Thought content normal. "         Lab Results- Independently reviewed by myself      Labs Reviewed - No data to display          Imaging     Imaging Results    None                    ED Course         Procedures         Orders Placed This Encounter    acetaminophen tablet 1,000 mg    dexAMETHasone injection 8 mg    methocarbamoL tablet 1,000 mg    ketorolac injection 30 mg    ketorolac (TORADOL) 10 mg tablet    methocarbamoL (ROBAXIN) 500 MG Tab    methylPREDNISolone (MEDROL DOSEPACK) 4 mg tablet          ED Course as of 05/25/25 1650   Sun May 25, 2025   0723 Patient states improvement in symptoms and would like to go prior to receiving the x-ray. [KB]      ED Course User Index  [KB] Benson Burrell MD              Medical Decision Making       The patient's list of active medical problems, social history, medications, and allergies as documented per RN staff has been reviewed.           Medical Decision Making  This is a 49 y.o. female presenting for evaluation of low back pain.    Low suspicion for acute cord compression or cauda equina at this time, given presentation and symptoms, including epidural abscess or hematoma. Patient has no history of malignancy, active or distant history.  Patient has no unexplained weight loss. No recent fevers, rigors, malaise, or recent infection.  No history of IVDU or skin-popping.  Patient does not have any history concerning for saddle anesthesia/perianal sensory loss or complaining of decreased rectal tone. Patient does not have urinary retention or inability to control urine from overflow.  Patient has no tenderness overlying spinous process. Patient has no focal weakness on examination.        Differential diagnosis is broad and includes but is not limited to:   Cauda equina, less likely without saddle anesthesia or loss of control of bowel or bladder  Epidural abscess, less likely without constitutional symptoms no history of IV drug use, no new focal neurologic deficits  Fracture/dislocation,  less likely without history of preceding trauma  Consider transverse myelitis, less likely without constitutional symptoms, pain does not follow a band like pattern  No rash to suggest zoster    Patient's symptoms are most consistent with musculoskeletal sprain or strain with associated radiculopathy consistent with patient's history    Patient's pain was treated in the ED. Advised to continue light activity at home. No heavy lifting. Close follow-up with PCP. Return to the ED for reevaluation should symptoms worsen, return, or change in character.       Amount and/or Complexity of Data Reviewed  Labs: ordered.  Radiology: ordered.    Risk  OTC drugs.  Prescription drug management.                    ED Prescriptions       Medication Sig Dispense Start Date End Date Auth. Provider    ketorolac (TORADOL) 10 mg tablet Take 1 tablet (10 mg total) by mouth every 8 (eight) hours. for 5 days 15 tablet 5/25/2025 5/30/2025 Benson Burrell MD    methocarbamoL (ROBAXIN) 500 MG Tab Take 1 tablet (500 mg total) by mouth 3 (three) times daily. for 7 days 20 tablet 5/25/2025 6/1/2025 Benson Burrell MD    methylPREDNISolone (MEDROL DOSEPACK) 4 mg tablet use as directed 21 each 5/25/2025 -- Benson Burrell MD              Clinical Impression       Follow-up Information       Follow up With Specialties Details Why Contact Info    Primary care provider of your choice  Schedule an appointment as soon as possible for a visit in 3 days For follow-up on today's visit.     [facility] - Emergency Dept Emergency Medicine Go to  As needed, If symptoms worsen [address] [facility]  Emergency Department  [facility] [id number] [phone]            Referrals:  No orders of the defined types were placed in this encounter.      Disposition   ED Disposition Condition    Discharge Stable              Final diagnoses:  [S39.012A] Lumbar strain, initial encounter (Primary)  [M54.16] Lumbar radiculopathy        Benson Burrell  MD        05/25/2025          DISCLAIMER: This note was prepared with [employer] voice recognition transcription software. Garbled syntax, mangled pronouns, and other bizarre constructions may be attributed to that software system.         [1]   Social History  Tobacco Use    Smoking status: Never    Smokeless tobacco: Never   Substance Use Topics    Alcohol use: No    Drug use: Never        Benson Burrell MD  05/25/25 0230

## 2025-05-25 NOTE — Clinical Note
"Maru "Belle Camacho was seen and treated in our emergency department on 5/25/2025.  She may return to work on 05/28/2025.       If you have any questions or concerns, please don't hesitate to call.      Benson Burrell MD"

## 2025-07-01 ENCOUNTER — HOSPITAL ENCOUNTER (EMERGENCY)
Facility: HOSPITAL | Age: 50
Discharge: HOME | End: 2025-07-01
Attending: EMERGENCY MEDICINE
Payer: MEDICAID

## 2025-07-01 VITALS
OXYGEN SATURATION: 98 % | SYSTOLIC BLOOD PRESSURE: 142 MMHG | BODY MASS INDEX: 41.46 KG/M2 | RESPIRATION RATE: 17 BRPM | HEART RATE: 68 BPM | DIASTOLIC BLOOD PRESSURE: 77 MMHG | HEIGHT: 63 IN | WEIGHT: 234 LBS | TEMPERATURE: 98 F

## 2025-07-01 DIAGNOSIS — R51.9 NONINTRACTABLE HEADACHE, UNSPECIFIED CHRONICITY PATTERN, UNSPECIFIED HEADACHE TYPE: Primary | ICD-10-CM

## 2025-07-01 PROCEDURE — 25000003 PHARM REV CODE 250: Mod: ER

## 2025-07-01 PROCEDURE — 96372 THER/PROPH/DIAG INJ SC/IM: CPT

## 2025-07-01 PROCEDURE — 63600175 PHARM REV CODE 636 W HCPCS: Mod: ER

## 2025-07-01 PROCEDURE — 99284 EMERGENCY DEPT VISIT MOD MDM: CPT | Mod: 25,ER

## 2025-07-01 RX ORDER — MELOXICAM 15 MG/1
15 TABLET ORAL DAILY
Qty: 30 TABLET | Refills: 0 | Status: SHIPPED | OUTPATIENT
Start: 2025-07-01 | End: 2025-07-31

## 2025-07-01 RX ORDER — KETOROLAC TROMETHAMINE 30 MG/ML
15 INJECTION, SOLUTION INTRAMUSCULAR; INTRAVENOUS
Status: COMPLETED | OUTPATIENT
Start: 2025-07-01 | End: 2025-07-01

## 2025-07-01 RX ORDER — METOCLOPRAMIDE HYDROCHLORIDE 5 MG/ML
10 INJECTION INTRAMUSCULAR; INTRAVENOUS
Status: COMPLETED | OUTPATIENT
Start: 2025-07-01 | End: 2025-07-01

## 2025-07-01 RX ORDER — DIPHENHYDRAMINE HCL 25 MG
25 CAPSULE ORAL
Status: COMPLETED | OUTPATIENT
Start: 2025-07-01 | End: 2025-07-01

## 2025-07-01 RX ORDER — ACETAMINOPHEN 500 MG
1000 TABLET ORAL 4 TIMES DAILY
Qty: 112 TABLET | Refills: 0 | Status: SHIPPED | OUTPATIENT
Start: 2025-07-01 | End: 2025-07-15

## 2025-07-01 RX ADMIN — KETOROLAC TROMETHAMINE 15 MG: 30 INJECTION, SOLUTION INTRAMUSCULAR; INTRAVENOUS at 09:07

## 2025-07-01 RX ADMIN — METOCLOPRAMIDE 10 MG: 5 INJECTION, SOLUTION INTRAMUSCULAR; INTRAVENOUS at 09:07

## 2025-07-01 RX ADMIN — DIPHENHYDRAMINE HYDROCHLORIDE 25 MG: 25 CAPSULE ORAL at 09:07

## 2025-07-01 NOTE — Clinical Note
"Maru "Belle Camacho was seen and treated in our emergency department on 7/1/2025.  She may return to work on 07/03/2025.       If you have any questions or concerns, please don't hesitate to call.      Vidhya Ghosh PA-C"

## 2025-07-02 NOTE — ED PROVIDER NOTES
Encounter Date: 7/1/2025       History     Chief Complaint   Patient presents with    Headache     Headache since yesterday, vison blurred at times today, states she suffers with migraines      Maru Doug is a 49 y.o. female  has a past medical history of Hypertension and Seizures. presenting to the Emergency Department for headaches since yesterday.  Patient states this headache feels like her headache since she typically has.  Reports associated photophobia.  No phonophobia.  Reports nausea but denies vomiting.  Patient has only tried Benadryl for pain.  Denies any fevers, chills, upper respiratory symptoms, numbness or paresthesias.  No other complaints at this time.        The history is provided by the patient.     Review of patient's allergies indicates:   Allergen Reactions    Shellfish containing products Hives     Past Medical History:   Diagnosis Date    Hypertension     Seizures      Past Surgical History:   Procedure Laterality Date    ADENOIDECTOMY      DILATION AND CURETTAGE OF UTERUS      EXCISION OF GANGLION OF WRIST Left 7/2/2024    Procedure: EXCISION, GANGLION CYST, WRIST;  Surgeon: Connor Esparza Jr., MD;  Location: [facility] OR;  Service: Orthopedics;  Laterality: Left;    ROBOT-ASSISTED LAPAROSCOPIC HYSTERECTOMY N/A 9/13/2023    Procedure: ROBOTIC HYSTERECTOMY;  Surgeon: Griffin Dailey MD;  Location: [facility] OR;  Service: OB/GYN;  Laterality: N/A;    ROBOT-ASSISTED LAPAROSCOPIC OOPHORECTOMY Right 9/13/2023    Procedure: ROBOTIC OOPHORECTOMY;  Surgeon: Griffin Dailey MD;  Location: [facility] OR;  Service: OB/GYN;  Laterality: Right;    ROBOT-ASSISTED SALPINGECTOMY Bilateral 9/13/2023    Procedure: ROBOTIC SALPINGECTOMY;  Surgeon: Griffin Dailey MD;  Location: [facility] OR;  Service: OB/GYN;  Laterality: Bilateral;  Right Oophorectomy     No family history on file.  Social History[1]  Review of Systems   Constitutional:  Negative for fever.   HENT:  Negative for sore throat.    Respiratory:   Negative for shortness of breath.    Cardiovascular:  Negative for chest pain.   Gastrointestinal:  Negative for nausea.   Genitourinary:  Negative for dysuria.   Musculoskeletal:  Negative for back pain.   Skin:  Negative for rash.   Neurological:  Positive for headaches. Negative for weakness.   Hematological:  Does not bruise/bleed easily.   All other systems reviewed and are negative.      Physical Exam     Initial Vitals [07/01/25 2049]   BP Pulse Resp Temp SpO2   (!) 162/109 76 16 98.2 °F (36.8 °C) 98 %      MAP       --         Physical Exam    Nursing note and vitals reviewed.  Constitutional: Vital signs are normal. She appears well-developed and well-nourished. She is not diaphoretic. She is active.  Non-toxic appearance. No distress.   HENT:   Head: Normocephalic and atraumatic.   Right Ear: Hearing, tympanic membrane, external ear and ear canal normal.   Left Ear: Hearing, tympanic membrane, external ear and ear canal normal.   Nose: Nose normal. Right sinus exhibits no maxillary sinus tenderness and no frontal sinus tenderness. Left sinus exhibits no maxillary sinus tenderness and no frontal sinus tenderness. Mouth/Throat: Uvula is midline, oropharynx is clear and moist and mucous membranes are normal. No trismus in the jaw. No uvula swelling. No oropharyngeal exudate, posterior oropharyngeal edema or posterior oropharyngeal erythema.   Eyes: Conjunctivae, EOM and lids are normal. Pupils are equal, round, and reactive to light.   Neck: Phonation normal. Neck supple. No Brudzinski's sign and no Kernig's sign noted.   Normal range of motion.  Cardiovascular:  Normal rate, regular rhythm, normal heart sounds and normal pulses.           Pulmonary/Chest: Breath sounds normal. No respiratory distress. She has no wheezes. She has no rales.   Abdominal: She exhibits no distension.   Musculoskeletal:         General: Normal range of motion.      Cervical back: Normal range of motion and neck supple. No  "rigidity. Normal range of motion.     Lymphadenopathy:     She has no cervical adenopathy.   Neurological: She is alert and oriented to person, place, and time. She has normal strength. She is not disoriented. No cranial nerve deficit or sensory deficit. GCS score is 15. GCS eye subscore is 4. GCS verbal subscore is 5. GCS motor subscore is 6.   Skin: Skin is warm and dry.   Psychiatric: She has a normal mood and affect. Her behavior is normal. Judgment and thought content normal.         ED Course   Procedures  Labs Reviewed - No data to display       Imaging Results    None          Medications   ketorolac injection 15 mg (15 mg Intramuscular Given 7/1/25 2119)   metoclopramide injection 10 mg (10 mg Intramuscular Given 7/1/25 2118)   diphenhydrAMINE capsule 25 mg (25 mg Oral Given 7/1/25 2118)     Medical Decision Making  This is an emergent evaluation of 49 y.o. female in the ED presenting for headache. Physical exam reveals a non-toxic, afebrile, and well-appearing female in no apparent respiratory distress. Pertinent physical exam findings above. Vital signs stable. If available, previous records reviewed.    Feel the patient's headache is benign.  The headache completely resolved with fluids, compazine, benadryl, and toradol.  No neck stiffness, vision changes, fever, rash, meningismus/neck stiffness to suggest pseudotumor cerebri or meningitis.  No pain over temporal arteries or vision changes/loss to suggest temporal arteritis.  No "thunderclap onset" or neck stiffness to suggest spontaneous SAH/ICH.  No lancinated pain to eyes with tearing to suggest cluster headache.  No sinus pressure or nasal congestion to suggest sinus headache.  Patient's headache is not in a "band like" distrubution to suggest tension headache. The patient's current headache seems to fit the intensity and pattern of prior headaches.  Patient has no evidence of infection nor neurological findings to suggest a more malignant cause for " the headache.     My overall impression is :  Nonintractable headache, unspecified chronicity pattern, unspecified headache type (Primary)  . Differential Diagnoses: Included but not limited to Tension headache, pseudotumor cerebri, temporal arteritis, ICH/SAH, Intracranial mass, migraine, meningitis, cluster headache, sinus headache, viral syndrome    Discharge Meds/Instructions: Meds below. Outpatient f/u.     There does not appear to be any indication for further emergent testing, observation, or hospitalization at this time. A mutual shared decision making discussion was had with the patient. Patient appears stable for and is comfortable with discharge home. The diagnosis, treatment plan, instructions for follow-up as well as ED return precautions were discussed. Advised to follow-up with PCP for outpatient follow-up in 2-3 days. Signs and symptoms that would warrant immediate return to ED were reviewed prior to discharge. All questions and concerns were asked, answered, and addressed. Patient expressed understanding and agreement with the plan.     Risk  OTC drugs.  Prescription drug management.               ED Course as of 07/01/25 2226 Tue Jul 01, 2025 2159 On re-evaluation, patient reports symptom improvement. [LH]      ED Course User Index  [LH] Vidhya Ghosh PA-C                           Clinical Impression:  Final diagnoses:  [R51.9] Nonintractable headache, unspecified chronicity pattern, unspecified headache type (Primary)          ED Disposition Condition    Discharge Stable          ED Prescriptions       Medication Sig Dispense Start Date End Date Auth. Provider    meloxicam (MOBIC) 15 MG tablet Take 1 tablet (15 mg total) by mouth once daily. 30 tablet 7/1/2025 7/31/2025 Vidhya Ghosh PA-C    acetaminophen (TYLENOL) 500 MG tablet Take 2 tablets (1,000 mg total) by mouth 4 (four) times daily. for 14 days 112 tablet 7/1/2025 7/15/2025 Vidhya Ghosh PA-C          Follow-up Information    None                 [1]   Social History  Tobacco Use    Smoking status: Never    Smokeless tobacco: Never   Substance Use Topics    Alcohol use: No    Drug use: Never        Vidhya Ghosh PA-C  07/01/25 9877

## 2025-07-25 ENCOUNTER — HOSPITAL ENCOUNTER (EMERGENCY)
Facility: HOSPITAL | Age: 50
Discharge: HOME | End: 2025-07-25
Attending: EMERGENCY MEDICINE
Payer: MEDICAID

## 2025-07-25 VITALS
WEIGHT: 230 LBS | HEIGHT: 63 IN | RESPIRATION RATE: 16 BRPM | HEART RATE: 74 BPM | DIASTOLIC BLOOD PRESSURE: 106 MMHG | OXYGEN SATURATION: 98 % | TEMPERATURE: 98 F | BODY MASS INDEX: 40.75 KG/M2 | SYSTOLIC BLOOD PRESSURE: 179 MMHG

## 2025-07-25 DIAGNOSIS — T23.121A SUPERFICIAL BURN OF FINGER OF RIGHT HAND, INITIAL ENCOUNTER: Primary | ICD-10-CM

## 2025-07-25 PROCEDURE — 99283 EMERGENCY DEPT VISIT LOW MDM: CPT | Mod: ER

## 2025-07-25 PROCEDURE — 25000003 PHARM REV CODE 250: Mod: ER

## 2025-07-25 RX ORDER — MUPIROCIN 20 MG/G
OINTMENT TOPICAL 3 TIMES DAILY
Qty: 15 G | Refills: 0 | Status: SHIPPED | OUTPATIENT
Start: 2025-07-25

## 2025-07-25 RX ORDER — MUPIROCIN 20 MG/G
1 OINTMENT TOPICAL
Status: COMPLETED | OUTPATIENT
Start: 2025-07-25 | End: 2025-07-25

## 2025-07-25 RX ADMIN — MUPIROCIN 1 TUBE: 20 OINTMENT TOPICAL at 10:07

## 2025-07-25 NOTE — DISCHARGE INSTRUCTIONS
Please keep your wound clean and dry. Wash gently with soap and water and apply antibiotic ointment (bacitracin, neosporin, etc.) over the wound after washing. Watch for signs of infection including: increased\spreading redness, swelling, pus-like discharge, or a fever greater than 100.4F. If you experience any of these, please contact your primary care doctor or return to the emergency room for a wound check. You may take over the counter Ibuprofen or Tylenol as needed for pain.     Thank you for allowing me and my emergency team to take care of you here today! I hope you feel better soon. Please do not hesitate to return with any additional concerns that may arise from this or any new problem you encounter.    Our goal in the emergency department is to always give you outstanding care and exceptional service. If you receive a survey by mail or e-mail in the next week regarding your experience in our ED, we would greatly appreciate you completing it. Your feedback provides us with a way to recognize our staff who give very good care and it helps us learn how to improve when your experience was below the excellence we aspire to be!    [place]ok [place], PA-C [facility]   Emergency Room Physician Assistant

## 2025-07-25 NOTE — ED PROVIDER NOTES
"Encounter Date: 7/25/2025       History     Chief Complaint   Patient presents with    Burn     Grease burn to 3rd and 4th fingers on right hand         Patient is a 49-year-old female with a past medical history of hypertension and seizures who presents to the emergency room for burn to dorsal aspect of 3rd and 4th digit that occurred just prior to arrival.  Patient states that she was cooking with grease at her house when some of it "popped" and got on to her fingers.  She immediately ran her fingers under cold water and placed salt on the top. No pain at this time. No drainage. No weakness, numbness, or tingling.  No medications taken prior to arrival.    The history is provided by the patient. No  was used.     Review of patient's allergies indicates:   Allergen Reactions    Shellfish containing products Hives     Past Medical History:   Diagnosis Date    Hypertension     Seizures      Past Surgical History:   Procedure Laterality Date    ADENOIDECTOMY      DILATION AND CURETTAGE OF UTERUS      EXCISION OF GANGLION OF WRIST Left 7/2/2024    Procedure: EXCISION, GANGLION CYST, WRIST;  Surgeon: Connor Esparza Jr., MD;  Location: [facility] OR;  Service: Orthopedics;  Laterality: Left;    ROBOT-ASSISTED LAPAROSCOPIC HYSTERECTOMY N/A 9/13/2023    Procedure: ROBOTIC HYSTERECTOMY;  Surgeon: Griffin Dailey MD;  Location: [facility] OR;  Service: OB/GYN;  Laterality: N/A;    ROBOT-ASSISTED LAPAROSCOPIC OOPHORECTOMY Right 9/13/2023    Procedure: ROBOTIC OOPHORECTOMY;  Surgeon: Griffin Dailey MD;  Location: [facility] OR;  Service: OB/GYN;  Laterality: Right;    ROBOT-ASSISTED SALPINGECTOMY Bilateral 9/13/2023    Procedure: ROBOTIC SALPINGECTOMY;  Surgeon: Griffin Dailey MD;  Location: [facility] OR;  Service: OB/GYN;  Laterality: Bilateral;  Right Oophorectomy     No family history on file.  Social History[1]  Review of Systems   Constitutional:  Negative for chills, diaphoresis, fatigue and fever. "   Musculoskeletal:  Negative for arthralgias, joint swelling and myalgias.   Skin:  Negative for color change, rash and wound.        + Burn to 3rd and 4th digit   Neurological:  Negative for weakness and numbness.       Physical Exam     Initial Vitals [07/25/25 1037]   BP Pulse Resp Temp SpO2   (!) 179/106 74 16 98.3 °F (36.8 °C) 98 %      MAP       --         Physical Exam    Nursing note and vitals reviewed.  Constitutional: She appears well-developed and well-nourished. She is not diaphoretic. No distress.   HENT:   Head: Normocephalic and atraumatic.   Right Ear: External ear normal.   Left Ear: External ear normal.   Eyes: Conjunctivae and EOM are normal.   Neck: Neck supple.   Normal range of motion.  Pulmonary/Chest: No respiratory distress.   Musculoskeletal:         General: No tenderness or edema. Normal range of motion.        Hands:       Cervical back: Normal range of motion and neck supple.     Neurological: She is alert and oriented to person, place, and time. She has normal strength.   Skin: Skin is warm. Burn noted.   Psychiatric: She has a normal mood and affect. Her behavior is normal. Thought content normal.         ED Course   Procedures  Labs Reviewed - No data to display       Imaging Results    None          Medications   mupirocin 2 % ointment 1 Tube (1 Tube Topical (Top) Given 7/25/25 1056)     Medical Decision Making  Patient presents to emergency room for burn to 3rd and 4th digit of right hand.  Vital signs stable.  Physical exam as stated above.    Differential diagnosis includes but isn't limited to first-degree burn, partial-thickness burn, full-thickness burn, third-degree burn, 4th degree burn, cellulitis, among others.  Patient with 2 small areas of erythema to suggest burn.  Areas are blanchable.  No blistering.  Suggestive of first-degree burn.  Area was cleansed in the emergency room, mupirocin applied, and dressings were placed.  Advised patient on wound care.  Will prescribe  mupirocin to use upon discharge.  Discussed over-the-counter medication for analgesia including ibuprofen and Tylenol.    No acute emergent medical condition has been identified beyond that addressed during our encounter. The patient appears to be low risk for an emergent medical condition is appropriate for discharge with outpatient f/u as detailed in discharge instructions for reevaluation and possible continued outpatient workup and management. I have discussed the workup with the patient, who has verbalized understanding of the plan and need for outpatient follow-up. Also, all prescribed and over the counter medications I have advised patient to take have been discussed at length in addition to risks and benefits of taking these medications. This evaluation does not preclude the development of an emergent condition so in addition, I have advised the patient that they can return to the ED at any time with worsening or change of their symptoms, or with any other medical complaint.     Problems Addressed:  Superficial burn of finger of right hand, initial encounter: acute illness or injury    Amount and/or Complexity of Data Reviewed  External Data Reviewed: notes.     Details: Patient last seen by primary care in 05/2025.    Risk  Prescription drug management.  Risk Details: Comorbidities taken into consideration during the patient's evaluation and treatment include hypertension and seizures.    Social determinants of health taken into consideration during development of our treatment plan include difficulty in obtaining follow-up, obtaining medications, health literacy, access to healthy options for preventative/conservative management, and/or support systems due to, but not limited to, transportation limitations, socioeconomic status, and environmental factors.                                           Clinical Impression:  Final diagnoses:  [T23.121A] Superficial burn of finger of right hand, initial encounter  (Primary)          ED Disposition Condition    Discharge Stable          ED Prescriptions       Medication Sig Dispense Start Date End Date Auth. Provider    mupirocin (BACTROBAN) 2 % ointment Apply topically 3 (three) times daily. 15 g 7/25/2025 -- Taryn Walker PA-C          Follow-up Information       Follow up With Specialties Details Why Contact [facility] - Emergency Dept Emergency Medicine   [address] [facility]  Emergency Department  [facility] [address] [phone]    Your doctor  Schedule an appointment as soon as possible for a visit  As soon as possible for follow up             This note was partially created using [employer] Voice Recognition software. Typographical and content errors may occur with this process. While efforts are made to detect and correct such errors, in some cases errors will persist. For this reason, wording in this document should be considered in the proper context and not strictly verbatim.          [1]   Social History  Tobacco Use    Smoking status: Never    Smokeless tobacco: Never   Substance Use Topics    Alcohol use: No    Drug use: Never        Taryn Walker PA-C  07/25/25 1117

## (undated) DEVICE — COVER TIP CURVED SCISSORS XI

## (undated) DEVICE — SEE MEDLINE ITEM 154981

## (undated) DEVICE — PAD PREP CUFFED NS 24X48IN

## (undated) DEVICE — SYS SEE SHARP SCP ANTIFG LNG

## (undated) DEVICE — OBTURATOR BLADELESS 8MM XI CLR

## (undated) DEVICE — SUT 0 VICRYL / UR6 (J603)

## (undated) DEVICE — SUT MONOCYRL 4-0 PS2 UND

## (undated) DEVICE — SYR 10CC LUER LOCK

## (undated) DEVICE — ALCOHOL 70% ISOP W/GREEN 16OZ

## (undated) DEVICE — PACK ECLIPSE BASIC III SURG

## (undated) DEVICE — SUT VICRYL 0 27 CT-2

## (undated) DEVICE — SPONGE COTTON TRAY 4X4IN

## (undated) DEVICE — SOL NACL .9P 500ML

## (undated) DEVICE — MANIFOLD 4 PORT

## (undated) DEVICE — ELECTRODE REM PLYHSV RETURN 9

## (undated) DEVICE — DRESSING XEROFORM NONADH 1X8IN

## (undated) DEVICE — TOURNIQUET SB QC DP 18X4IN

## (undated) DEVICE — GLOVE ULTRATOUCH PLYSPHRN 7.5

## (undated) DEVICE — GOWN POLY REINF X-LONG 2XL

## (undated) DEVICE — GLOVE BIOGEL PI MICRO INDIC 7

## (undated) DEVICE — SOL IRR STRL WATER 500ML

## (undated) DEVICE — DRESSING MEPILEX SACR 22X25CM

## (undated) DEVICE — GLOVE SENSICARE PI MICRO 7.5

## (undated) DEVICE — TRAY FOLEY 16FR INFECTION CONT

## (undated) DEVICE — BANDAGE ADHESIVE PLAS STRL 1X3

## (undated) DEVICE — SYR 50CC LL

## (undated) DEVICE — IRRIGATOR ENDOWRIST XI SUCTION

## (undated) DEVICE — SOL NACL IRR 3000ML

## (undated) DEVICE — SUT ETHILON 5-0 PS-2 18IN

## (undated) DEVICE — BANDAGE MATRIX HK LOOP 2IN 5YD

## (undated) DEVICE — SEAL UNIVERSAL 5MM-8MM XI

## (undated) DEVICE — CLOSURE SKIN STERI STRIP 1/2X4

## (undated) DEVICE — GLOVE SURG BIOGEL LATEX SZ 7.5

## (undated) DEVICE — DRAPE TOP 53X102IN

## (undated) DEVICE — ADHESIVE DERMABOND ADVANCED

## (undated) DEVICE — Device

## (undated) DEVICE — GLOVE SURGICAL LATEX SZ 6.5

## (undated) DEVICE — TUBING INSUFFLATION W/LUER LOK

## (undated) DEVICE — COVER OVERHEAD SURG LT BLUE

## (undated) DEVICE — SCISSOR 5MMX35CM DIRECT DRIVE

## (undated) DEVICE — PACK SURGERY START

## (undated) DEVICE — COVER TABLE HVY DTY 60X90IN

## (undated) DEVICE — SPLINT WRIST FOAM 8.8IN LG/LFT

## (undated) DEVICE — ADHESIVE MASTISOL VIAL 48/BX

## (undated) DEVICE — STRIP MEDI WND CLSR 1/2X4IN

## (undated) DEVICE — TOURNIQUET 1 PORT YELLOW24X4IN

## (undated) DEVICE — GLOVE BIOGEL PIMICRO INDIC 7.5

## (undated) DEVICE — APPLICATOR CHLORAPREP ORN 26ML

## (undated) DEVICE — SUT VICRYL 4-0 ANTIBACT

## (undated) DEVICE — CONTAINERS 32OZ

## (undated) DEVICE — PAD CNTOUR SUP-ABSRB POSTPRTM

## (undated) DEVICE — STOCKINET 4INX48

## (undated) DEVICE — STRIP MEDI WND CLSR 1/4X4IN

## (undated) DEVICE — SEALER VESSEL EXTEND

## (undated) DEVICE — GLOVE BIOGEL PI MICRO INDIC 8

## (undated) DEVICE — GOWN POLY REINF BRTH SLV XL

## (undated) DEVICE — JELLY LUBRICANT STERILE 4 OZ

## (undated) DEVICE — TOWEL OR XRAY BLUE 17X26IN

## (undated) DEVICE — SEE L#120831

## (undated) DEVICE — BLADE SCALP OPHTL BEVEL STR

## (undated) DEVICE — PAD CAST 2 IN X 4YDS STERILE

## (undated) DEVICE — GLOVE SURGICAL LATEX SZ 7

## (undated) DEVICE — DRAPE LAVH SURG 109X109X100IN

## (undated) DEVICE — GOWN POLY REINF BRTH SLV LG

## (undated) DEVICE — COVER MAYO STND XL 30X57IN

## (undated) DEVICE — PAD PINK TRENDELENBURG POS XL

## (undated) DEVICE — GLOVE BIOGEL PIMICRO INDIC 6.5

## (undated) DEVICE — BANDAGE ESMARK ELASTIC ST 4X9

## (undated) DEVICE — TRAY SKIN SCRUB WET PREMIUM

## (undated) DEVICE — NDL INSUF ULTRA VERESS 120MM

## (undated) DEVICE — DRAPE HAND STERILE

## (undated) DEVICE — DRAPE ARM DAVINCI XI

## (undated) DEVICE — SUT 4/0 18IN COATED VICRYL

## (undated) DEVICE — PANTIES FEMININE NAPKIN LG/XLG

## (undated) DEVICE — UNDERGLOVES BIOGEL PI SZ 7 LF

## (undated) DEVICE — TIP RUMI BLUE DISPOSABLE 5/BX

## (undated) DEVICE — SUT VIOL GS-22 2-0 30CM 12IN